# Patient Record
Sex: MALE | Race: WHITE | NOT HISPANIC OR LATINO | Employment: FULL TIME | URBAN - METROPOLITAN AREA
[De-identification: names, ages, dates, MRNs, and addresses within clinical notes are randomized per-mention and may not be internally consistent; named-entity substitution may affect disease eponyms.]

---

## 2017-01-06 ENCOUNTER — ALLSCRIPTS OFFICE VISIT (OUTPATIENT)
Dept: OTHER | Facility: OTHER | Age: 52
End: 2017-01-06

## 2017-06-29 ENCOUNTER — TRANSCRIBE ORDERS (OUTPATIENT)
Dept: SLEEP CENTER | Facility: CLINIC | Age: 52
End: 2017-06-29

## 2017-06-29 DIAGNOSIS — G47.33 OSA AND COPD OVERLAP SYNDROME (HCC): Primary | ICD-10-CM

## 2017-06-29 DIAGNOSIS — J44.9 OSA AND COPD OVERLAP SYNDROME (HCC): Primary | ICD-10-CM

## 2017-06-30 ENCOUNTER — ALLSCRIPTS OFFICE VISIT (OUTPATIENT)
Dept: OTHER | Facility: OTHER | Age: 52
End: 2017-06-30

## 2017-11-21 ENCOUNTER — GENERIC CONVERSION - ENCOUNTER (OUTPATIENT)
Dept: OTHER | Facility: OTHER | Age: 52
End: 2017-11-21

## 2017-12-08 ENCOUNTER — ALLSCRIPTS OFFICE VISIT (OUTPATIENT)
Dept: OTHER | Facility: OTHER | Age: 52
End: 2017-12-08

## 2017-12-08 ENCOUNTER — TRANSCRIBE ORDERS (OUTPATIENT)
Dept: ADMINISTRATIVE | Facility: HOSPITAL | Age: 52
End: 2017-12-08

## 2017-12-08 DIAGNOSIS — R42 DIZZINESS AND GIDDINESS: ICD-10-CM

## 2017-12-08 DIAGNOSIS — I25.10 ATHEROSCLEROSIS OF NATIVE CORONARY ARTERY WITHOUT ANGINA PECTORIS, UNSPECIFIED WHETHER NATIVE OR TRANSPLANTED HEART: Primary | ICD-10-CM

## 2017-12-08 DIAGNOSIS — I10 ESSENTIAL (PRIMARY) HYPERTENSION: ICD-10-CM

## 2017-12-08 DIAGNOSIS — R06.02 SHORTNESS OF BREATH: ICD-10-CM

## 2017-12-08 DIAGNOSIS — I25.10 ATHEROSCLEROTIC HEART DISEASE OF NATIVE CORONARY ARTERY WITHOUT ANGINA PECTORIS: ICD-10-CM

## 2017-12-08 DIAGNOSIS — E78.5 HYPERLIPIDEMIA: ICD-10-CM

## 2018-01-14 VITALS
DIASTOLIC BLOOD PRESSURE: 78 MMHG | WEIGHT: 246 LBS | SYSTOLIC BLOOD PRESSURE: 126 MMHG | HEART RATE: 83 BPM | BODY MASS INDEX: 37.28 KG/M2 | HEIGHT: 68 IN

## 2018-01-14 VITALS
DIASTOLIC BLOOD PRESSURE: 80 MMHG | SYSTOLIC BLOOD PRESSURE: 132 MMHG | HEIGHT: 68 IN | OXYGEN SATURATION: 99 % | HEART RATE: 75 BPM | BODY MASS INDEX: 37.34 KG/M2 | WEIGHT: 246.38 LBS

## 2018-01-23 VITALS
WEIGHT: 263 LBS | HEIGHT: 68 IN | SYSTOLIC BLOOD PRESSURE: 162 MMHG | OXYGEN SATURATION: 99 % | DIASTOLIC BLOOD PRESSURE: 88 MMHG | HEART RATE: 76 BPM | BODY MASS INDEX: 39.86 KG/M2

## 2018-01-23 NOTE — MISCELLANEOUS
Message  Return to work or school:    He is able to return to work on  12/10/2017    He can perform work without limitations  Weight Bearing Status: Full Weight-Bearing           Signatures   Electronically signed by : BARBARA Jauregui ; Dec  8 2017  5:17PM EST                       (Author)

## 2018-02-06 DIAGNOSIS — I10 ESSENTIAL HYPERTENSION: Primary | ICD-10-CM

## 2018-02-06 RX ORDER — TRIAMTERENE AND HYDROCHLOROTHIAZIDE 37.5; 25 MG/1; MG/1
0.5 TABLET ORAL DAILY
Qty: 15 TABLET | Refills: 5 | Status: SHIPPED | OUTPATIENT
Start: 2018-02-06 | End: 2018-09-07

## 2018-02-06 RX ORDER — TRIAMTERENE AND HYDROCHLOROTHIAZIDE 37.5; 25 MG/1; MG/1
0.5 TABLET ORAL DAILY
Refills: 1 | COMMUNITY
Start: 2018-01-03 | End: 2018-02-06 | Stop reason: SDUPTHER

## 2018-02-08 ENCOUNTER — TELEPHONE (OUTPATIENT)
Dept: CARDIOLOGY CLINIC | Facility: CLINIC | Age: 53
End: 2018-02-08

## 2018-02-08 PROBLEM — G47.33 OBSTRUCTIVE SLEEP APNEA ON CPAP: Status: ACTIVE | Noted: 2017-06-30

## 2018-02-08 PROBLEM — Z99.89 OBSTRUCTIVE SLEEP APNEA ON CPAP: Status: ACTIVE | Noted: 2017-06-30

## 2018-02-08 PROBLEM — R42 DIZZINESS: Status: ACTIVE | Noted: 2017-12-08

## 2018-02-08 PROBLEM — E11.9 TYPE 2 DIABETES MELLITUS WITHOUT COMPLICATION, WITHOUT LONG-TERM CURRENT USE OF INSULIN (HCC): Status: ACTIVE | Noted: 2017-01-06

## 2018-02-08 PROBLEM — R06.02 EXERTIONAL SHORTNESS OF BREATH: Status: ACTIVE | Noted: 2017-12-08

## 2018-02-08 PROBLEM — R94.31 ABNORMAL EKG: Status: ACTIVE | Noted: 2017-12-08

## 2018-02-08 PROBLEM — E66.8 MODERATE OBESITY: Status: ACTIVE | Noted: 2017-01-06

## 2018-04-20 DIAGNOSIS — I10 ESSENTIAL HYPERTENSION: Primary | ICD-10-CM

## 2018-04-20 RX ORDER — CARVEDILOL 12.5 MG/1
TABLET ORAL
Qty: 180 TABLET | Refills: 2 | Status: SHIPPED | OUTPATIENT
Start: 2018-04-20 | End: 2019-01-25 | Stop reason: HOSPADM

## 2018-04-26 DIAGNOSIS — Z98.61 HISTORY OF PERCUTANEOUS CORONARY INTERVENTION: Primary | ICD-10-CM

## 2018-04-26 RX ORDER — PRASUGREL 10 MG/1
10 TABLET, FILM COATED ORAL DAILY
Qty: 30 TABLET | Refills: 3 | Status: SHIPPED | OUTPATIENT
Start: 2018-04-26 | End: 2018-09-07

## 2018-09-02 NOTE — PROGRESS NOTES
Progress Note - Cardiology Office  [facility]    Lorenzo Gosselin Ferencin 48 y o  male MRN: [id number]  : 1965  Encounter: [id number]      Assessment:     1  New onset atrial fibrillation ([facility] )    2  Coronary artery disease involving native coronary artery of native heart with angina pectoris ([facility] )    3  History of percutaneous coronary intervention    4  Essential hypertension    5  Mixed hyperlipidemia    6  Moderate obesity    7  Obstructive sleep apnea on CPAP    8  Type 2 diabetes mellitus without complication, without long-term current use of insulin ([facility] )        Discussion Summary and Plan:    1  New onset newly diagnosed atrial fibrillation  Patient is noted to have atrial fibrillation with controlled ventricular rate  He is already on AV node blocking drug  He did feel fluttering few weeks ago but he had a noted  Will repeat nuclear stress test to rule out any ischemia and he will be scheduled for ELZBIETA guided cardioversion  We will stop his Effient and start him on antithrombotic therapy with Eliquis  Samples given  Continue Eliquis  Issues related to atrial fibrillation like rate control, rhythm control, risk of bleeding, no antidote of Eliquis, regularly using his CPAP machine all discussed at length with patient and patient's wife  All their questions answered  2  Coronary artery disease status post angioplasty of LAD in 2015 with a drug-eluting stent and nonobstructive disease in the right and circumflex coronary artery  Will update nuclear stress test as may need flecainide other medications  3  Dyslipidemia  He is well controlled on statin therapy  Check fasting lipids and LFTs    4  Hypertension  Patient blood pressure is much better controlled  Will continue Coreg Lotensin  Weight loss also has helped to control blood pressure  5  Moderate obesity  Patient is advised to lose weight  Patient now follow up with endocrinology    Medications were switched this has helped to lose weight  6  Obstructive sleep apnea  On CPAP  As per wife he has not been compliant with it  This may be also contributing to underlying atrial fibrillation  Patient advised to be compliant with this  All issues discussed at length  Routine labs ordered  Patient scheduled for ELZBIETA guided cardioversion and stress test   Discussed with patient and patient's wife  Counseling :  A description of the counseling  Goals and Barriers  Patient's ability to self care: Yes  Medication side effect reviewed with patient in detail and all their questions answered to their satisfaction  HPI :     Lyanne Olszewski is a 48y o  year old male who came for follow up  Patient who iis working as a  in [employer], history of coronary artery disease status post stent of LAD In February 2015, diabetes mellitus, metabolic syndrome, hypertension, anxiety , Status post ankle surgery And dyslipidemia, Who came for regular follow-up  During his previous admission his blood pressure was high currently his blood pressure is pretty well controlled  Denies any chest pain or any shortness of breath  No PND  No orthopnea, no other significant complaint  recently got  and came with his wife    /08/2017came for follow-up  Above reviewed  Complaining about anxiety and stress  Blood pressure was high  When checked by me was 162/88  Denies any fever or any chills  Has some exertional shortness of breath  Complaining about dizziness and wobbly feeling  Also stressed at work  He is concerned about his erectile dysfunction  He had performance anxiety  No other significant complaint today  No recent blood test with us  Very concerned about worsening of his plaque and blockage as well as his stent  09/07/2018  Above reviewed  Patient came for follow-up  He is doing well however on 12 lead EKG is found to be in atrial fibrillation with controlled ventricular rate    He was not even aware that he has irregular heartbeat  He was diagnosed with sleep apnea but he is not using his CPAP machine regularly  He has a past medical history significant for coronary artery disease status post angioplasty of LAD, dyslipidemia, hypertension, anxiety and moderate obesity  He also had uncontrolled diabetes mellitus which is now much better  Patient does not smoke, does not drink and no history of any drug abuse  He has no issues with GI bleeding  He has taken Effient for more than 4 years  Previously he had reaction to definity  No nausea no vomiting no PND no orthopnea no other significant complaint  now patient remembers about few weeks ago he noted his heart was fluttering and it was irregular  Patient has colonoscopy done last   Month and benign polyp was removed  Review of Systems   Constitutional: Negative for activity change, chills, diaphoresis, fever and unexpected weight change  HENT: Negative for congestion  Eyes: Negative for discharge and redness  Respiratory: Negative for cough, chest tightness, shortness of breath and wheezing  Cardiovascular: Positive for palpitations  Negative for chest pain and leg swelling  Gastrointestinal: Negative for abdominal pain, diarrhea and nausea  Endocrine: Negative  Genitourinary: Negative for decreased urine volume and urgency  Musculoskeletal: Negative  Negative for arthralgias, back pain and gait problem  Skin: Negative for rash and wound  Allergic/Immunologic: Negative  Neurological: Negative for dizziness, seizures, syncope, weakness, light-headedness and headaches  Hematological: Negative  Psychiatric/Behavioral: Negative for agitation and confusion  The patient is not nervous/anxious          Historical Information   Past Medical History:   Diagnosis Date    CAD (coronary artery disease)     Diabetes ([address] )     Dizziness     Erectile dysfunction     GERD (gastroesophageal reflux disease)     Hyperlipidemia     Hypertension     Shortness of breath      Past Surgical History:   Procedure Laterality Date    CARDIAC CATHETERIZATION       History   Alcohol Use    0 6 oz/week    1 Cans of beer per week     Comment: rarely     History   Drug use: Unknown     History   Smoking Status    Never Smoker   Smokeless Tobacco    Former User    Quit date: 9/7/2014     Family History:   Family History   Problem Relation Age of Onset    Coronary artery disease Family     Diabetes Family        Meds/Allergies     Allergies   Allergen Reactions    Other        Current Outpatient Prescriptions:     amLODIPine (NORVASC) 2 5 mg tablet, Take 2 5 mg by mouth daily, Disp: , Rfl: 0    benazepril (LOTENSIN) 40 MG tablet, Take 40 mg by mouth daily, Disp: , Rfl: 1    carvedilol (COREG) 12 5 mg tablet, TAKE 1 TABLET TWICE DAILY, Disp: 180 tablet, Rfl: 2    cholecalciferol (VITAMIN D3) 1,000 units tablet, Take by mouth daily, Disp: , Rfl:     co-enzyme Q-10 30 MG capsule, Take 30 mg by mouth 3 (three) times a day, Disp: , Rfl:     ONETOUCH VERIO test strip, CHECK TWICE DAILY, Disp: , Rfl: 3    OZEMPIC 1 MG/DOSE SOPN, INJECT 1 MG SQ Q WEEK, Disp: , Rfl: 4    prasugrel (EFFIENT) tablet, Take 1 tablet (10 mg total) by mouth daily, Disp: 30 tablet, Rfl: 3    rosuvastatin (CRESTOR) 20 MG tablet, Take 20 mg by mouth, Disp: , Rfl:     SYNJARDY XR 12 5-1000 MG TB24, TK 1 T PO BID, Disp: , Rfl: 2    Vitals: Blood pressure 122/78, pulse 87, height 5' 7" (1 702 m), weight 103 kg (227 lb), SpO2 98 %  Body mass index is 35 55 kg/m²    Vitals:    09/07/18 1358   Weight: 103 kg (227 lb)     BP Readings from Last 3 Encounters:   09/07/18 122/78   12/08/17 162/88   06/30/17 126/78         Physical Exam    Neurologic:  Alert & oriented x 3, no new focal deficits, Not in any acute distress,  Constitutional:  Well developed, well nourished, non-toxic appearance   Eyes:  Pupil equal and reacting to light, conjunctiva normal, No JVP, No LNP   HENT:  Atraumatic, oropharynx moist, Neck- normal range of motion, no tenderness,  Neck supple   Respiratory:  Bilateral air entry, mostly clear to auscultation  Cardiovascular: S1-S2   Irregular with heart rate 87 beats per minute and 2/6 ejection systolic murmur  GI:  Soft, nondistended, normal bowel sounds, nontender, no hepatosplenomegaly appreciated  Musculoskeletal:  No edema, no tenderness, no deformities  Skin:  Well hydrated, no rash   Lymphatic:  No lymphadenopathy noted   Extremities:  No edema and distal pulses are present    Diagnostic Studies Review Cardio:    Catheterization: Cardiac catheterization in February 2015 shows significant stenosis of the LAD which was successfully stented with a drug-eluting stent and nonobstructive disease of RCA and circumflex with EF around 45-50%  ECG Report:  Comparison to prior ECGs:    6/30/2017, 12-lead EKG shows normal sinus rhythm, left differential S2 block, LVH by voltage, nonspecific ST changes  As compared to old EKG  Patient has more ST-T changes 3lead EKG done in 12/08/2017 shows normal sinus rhythm, left anterior fascicular block, ST-T changes in lateral precordial leads  Not change from old EKG  09/07/2018  Twelve lead EKG shows atrial fibrillation with heart rate 87 beats per minute  Nonspecific ST changes  As compared to previous EKG atrial fibrillation is newly noted  Dr Daron Panchal MD [facility] [place]      "This note has been constructed using a voice recognition system  Therefore there may be syntax, spelling, and/or grammatical errors   Please call if you have any questions  "

## 2018-09-07 ENCOUNTER — OFFICE VISIT (OUTPATIENT)
Dept: CARDIOLOGY CLINIC | Facility: CLINIC | Age: 53
End: 2018-09-07
Payer: COMMERCIAL

## 2018-09-07 VITALS
BODY MASS INDEX: 35.63 KG/M2 | WEIGHT: 227 LBS | DIASTOLIC BLOOD PRESSURE: 78 MMHG | HEART RATE: 87 BPM | HEIGHT: 67 IN | SYSTOLIC BLOOD PRESSURE: 122 MMHG | OXYGEN SATURATION: 98 %

## 2018-09-07 DIAGNOSIS — E11.9 TYPE 2 DIABETES MELLITUS WITHOUT COMPLICATION, WITHOUT LONG-TERM CURRENT USE OF INSULIN (HCC): ICD-10-CM

## 2018-09-07 DIAGNOSIS — Z99.89 OBSTRUCTIVE SLEEP APNEA ON CPAP: ICD-10-CM

## 2018-09-07 DIAGNOSIS — I48.91 NEW ONSET ATRIAL FIBRILLATION (HCC): ICD-10-CM

## 2018-09-07 DIAGNOSIS — I48.91 NEW ONSET ATRIAL FIBRILLATION (HCC): Primary | ICD-10-CM

## 2018-09-07 DIAGNOSIS — G47.33 OBSTRUCTIVE SLEEP APNEA ON CPAP: ICD-10-CM

## 2018-09-07 DIAGNOSIS — I25.119 CORONARY ARTERY DISEASE INVOLVING NATIVE CORONARY ARTERY OF NATIVE HEART WITH ANGINA PECTORIS (HCC): ICD-10-CM

## 2018-09-07 DIAGNOSIS — E66.8 MODERATE OBESITY: ICD-10-CM

## 2018-09-07 DIAGNOSIS — Z98.61 HISTORY OF PERCUTANEOUS CORONARY INTERVENTION: ICD-10-CM

## 2018-09-07 DIAGNOSIS — I10 ESSENTIAL HYPERTENSION: ICD-10-CM

## 2018-09-07 DIAGNOSIS — E78.2 MIXED HYPERLIPIDEMIA: ICD-10-CM

## 2018-09-07 PROCEDURE — 93000 ELECTROCARDIOGRAM COMPLETE: CPT | Performed by: INTERNAL MEDICINE

## 2018-09-07 PROCEDURE — 99215 OFFICE O/P EST HI 40 MIN: CPT | Performed by: INTERNAL MEDICINE

## 2018-09-07 RX ORDER — AMLODIPINE BESYLATE 2.5 MG/1
2.5 TABLET ORAL DAILY
Qty: 30 TABLET | Refills: 3 | Status: SHIPPED | OUTPATIENT
Start: 2018-09-07 | End: 2019-01-25 | Stop reason: HOSPADM

## 2018-09-07 RX ORDER — BLOOD SUGAR DIAGNOSTIC
STRIP MISCELLANEOUS
Refills: 3 | COMMUNITY
Start: 2018-07-06

## 2018-09-07 RX ORDER — EMPAGLIFLOZIN, METFORMIN HYDROCHLORIDE 12.5; 1 MG/1; MG/1
TABLET, EXTENDED RELEASE ORAL
Refills: 2 | COMMUNITY
Start: 2018-07-09

## 2018-09-07 RX ORDER — MELATONIN
DAILY
COMMUNITY

## 2018-09-07 RX ORDER — AMLODIPINE BESYLATE 2.5 MG/1
2.5 TABLET ORAL DAILY
Refills: 0 | COMMUNITY
Start: 2018-06-11 | End: 2018-09-07 | Stop reason: SDUPTHER

## 2018-09-07 RX ORDER — SEMAGLUTIDE 1.34 MG/ML
INJECTION, SOLUTION SUBCUTANEOUS
Refills: 4 | COMMUNITY
Start: 2018-08-07

## 2018-09-07 RX ORDER — BENAZEPRIL HYDROCHLORIDE 40 MG/1
40 TABLET, FILM COATED ORAL DAILY
Refills: 1 | COMMUNITY
Start: 2018-06-28 | End: 2018-11-27 | Stop reason: SDUPTHER

## 2018-09-07 RX ORDER — ROSUVASTATIN CALCIUM 20 MG/1
20 TABLET, COATED ORAL
COMMUNITY
End: 2019-10-10 | Stop reason: SDUPTHER

## 2018-09-07 RX ORDER — ASPIRIN 81 MG/1
81 TABLET, CHEWABLE ORAL DAILY
Qty: 100 TABLET | Refills: 1 | Status: SHIPPED | OUTPATIENT
Start: 2018-09-07

## 2018-09-26 ENCOUNTER — HOSPITAL ENCOUNTER (OUTPATIENT)
Dept: NON INVASIVE DIAGNOSTICS | Facility: CLINIC | Age: 53
Discharge: HOME | End: 2018-09-26
Payer: COMMERCIAL

## 2018-09-26 DIAGNOSIS — Z98.61 HISTORY OF PERCUTANEOUS CORONARY INTERVENTION: ICD-10-CM

## 2018-09-26 DIAGNOSIS — I25.119 CORONARY ARTERY DISEASE INVOLVING NATIVE CORONARY ARTERY OF NATIVE HEART WITH ANGINA PECTORIS (HCC): ICD-10-CM

## 2018-09-26 DIAGNOSIS — I48.91 NEW ONSET ATRIAL FIBRILLATION (HCC): ICD-10-CM

## 2018-09-26 PROCEDURE — 93016 CV STRESS TEST SUPVJ ONLY: CPT | Performed by: INTERNAL MEDICINE

## 2018-09-26 PROCEDURE — 78452 HT MUSCLE IMAGE SPECT MULT: CPT | Performed by: INTERNAL MEDICINE

## 2018-09-26 PROCEDURE — 78452 HT MUSCLE IMAGE SPECT MULT: CPT

## 2018-09-26 PROCEDURE — 93017 CV STRESS TEST TRACING ONLY: CPT

## 2018-09-26 PROCEDURE — A9502 TC99M TETROFOSMIN: HCPCS

## 2018-09-26 PROCEDURE — 93018 CV STRESS TEST I&R ONLY: CPT | Performed by: INTERNAL MEDICINE

## 2018-09-27 ENCOUNTER — TELEPHONE (OUTPATIENT)
Dept: CARDIOLOGY CLINIC | Facility: CLINIC | Age: 53
End: 2018-09-27

## 2018-09-27 LAB
CHEST PAIN STATEMENT: NORMAL
MAX DIASTOLIC BP: 108 MMHG
MAX HEART RATE: 173 BPM
MAX PREDICTED HEART RATE: 169 BPM
MAX. SYSTOLIC BP: 184 MMHG
PROTOCOL NAME: NORMAL
REASON FOR TERMINATION: NORMAL
TARGET HR FORMULA: NORMAL
TEST INDICATION: NORMAL
TIME IN EXERCISE PHASE: NORMAL

## 2018-09-27 NOTE — TELEPHONE ENCOUNTER
----- Message from Yury Alicia sent at 9/27/2018  2:49 PM EDT -----      ----- Message -----  From: Hemalatha Choe MD  Sent: 9/26/2018   5:28 PM  To: Nargis Epps    Patient has abnormal stress test   Patient may need cardiac catheterization  Scheduled appointment to see him  His ejection fraction is low but no ischemia seen    His blood pressure has been high

## 2018-10-15 NOTE — PROGRESS NOTES
Progress Note - Cardiology Office  [facility]    Daniela Jeter 48 y o  male MRN: [id number]  : 1965  Encounter: [id number]      Assessment:     1  Coronary artery disease involving native coronary artery of native heart with angina pectoris ([facility] )    2  Exertional shortness of breath    3  Mixed hyperlipidemia    4  Obstructive sleep apnea on CPAP    5  Type 2 diabetes mellitus without complication, without long-term current use of insulin ([facility] )    6  Essential hypertension    7  Moderate obesity    8  Abnormal EKG        Discussion Summary and Plan:    1  New onset newly diagnosed atrial fibrillation  Patient is noted to have atrial fibrillation with controlled ventricular rate  He is already on AV node blocking drug  He did feel fluttering few weeks ago but he had a noted  Nuclear stress test shows patient has fixed defect no clear ischemia however ejection fraction has decreased  He will be scheduled for ELZBIETA guided cardioversion  2  Coronary artery disease status post angioplasty of LAD in 2015 with a drug-eluting stent and nonobstructive disease in the right and circumflex coronary artery  Nuclear stress test was mildly abnormal with decreased LV systolic function with no clear ischemia  There was a fixed defect  May need cardiac catheterization at some point  3  Dyslipidemia  He is well controlled on statin therapy  Check fasting lipids and LFTs  Patient just had blood test done today will reviewed  4  Hypertension  Patient blood pressure is much better controlled  Will continue Coreg Lotensin  Weight loss also has helped to control blood pressure  5  Moderate obesity  Patient is advised to lose weight  Patient now follow up with endocrinology  Medications were switched this has helped to lose weight  6  Obstructive sleep apnea  On CPAP  Patient is now much compliant with CPAP machine now  He is feeling much better clinically    Further plan after ELZBIETA guided cardioversion  If his ejection fraction improved few months after cardioversion  Then he may need no further workup however if EF does not improve he may need cardiac catheterization  No symptoms of chest pain or shortness of breath at this time  Counseling :  A description of the counseling  Goals and Barriers  Patient's ability to self care: Yes  Medication side effect reviewed with patient in detail and all their questions answered to their satisfaction  HPI :     Sudha Zambrano is a 48y o  year old male who came for follow up  Patient who iis working as a  in [employer], history of coronary artery disease status post stent of LAD In February 2015, diabetes mellitus, metabolic syndrome, hypertension, anxiety , Status post ankle surgery And dyslipidemia, Who came for regular follow-up  During his previous admission his blood pressure was high currently his blood pressure is pretty well controlled  Denies any chest pain or any shortness of breath  No PND  No orthopnea, no other significant complaint  recently got  and came with his wife    /08/2017came for follow-up  Above reviewed  Complaining about anxiety and stress  Blood pressure was high  When checked by me was 162/88  Denies any fever or any chills  Has some exertional shortness of breath  Complaining about dizziness and wobbly feeling  Also stressed at work  He is concerned about his erectile dysfunction  He had performance anxiety  No other significant complaint today  No recent blood test with us  Very concerned about worsening of his plaque and blockage as well as his stent  09/07/2018  Above reviewed  Patient came for follow-up  He is doing well however on 12 lead EKG is found to be in atrial fibrillation with controlled ventricular rate  He was not even aware that he has irregular heartbeat  He was diagnosed with sleep apnea but he is not using his CPAP machine regularly    He has a past medical history significant for coronary artery disease status post angioplasty of LAD, dyslipidemia, hypertension, anxiety and moderate obesity  He also had uncontrolled diabetes mellitus which is now much better  Patient does not smoke, does not drink and no history of any drug abuse  He has no issues with GI bleeding  He has taken Effient for more than 4 years  Previously he had reaction to definity  No nausea no vomiting no PND no orthopnea no other significant complaint  now patient remembers about few weeks ago he noted his heart was fluttering and it was irregular  Patient has colonoscopy done last   Month and benign polyp was removed  10/16/2018  Above reviewed  Patient came for follow-up  Patient has lost about 30 lb  He is feeling much better  He is staying in atrial fibrillation  Nuclear stress test shows fixed inferior apical defect  EF is noted to be low  Previously has a normal LV systolic function  He is very anxious about his cardioversion  He came with his wife  No leg swelling no nausea no vomiting  Sugar is much better blood pressure is much better  No dizziness no other significant issues at this time  Review of Systems   Constitutional: Negative for activity change, chills, diaphoresis, fever and unexpected weight change  HENT: Negative for congestion  Eyes: Negative for discharge and redness  Respiratory: Negative for cough, chest tightness, shortness of breath and wheezing  Cardiovascular: Positive for palpitations  Negative for chest pain and leg swelling  Gastrointestinal: Negative for abdominal pain, diarrhea and nausea  Endocrine: Negative  Genitourinary: Negative for decreased urine volume and urgency  Musculoskeletal: Negative  Negative for arthralgias, back pain and gait problem  Skin: Negative for rash and wound  Allergic/Immunologic: Negative      Neurological: Negative for dizziness, seizures, syncope, weakness, light-headedness and headaches  Hematological: Negative  Psychiatric/Behavioral: Negative for agitation and confusion  The patient is nervous/anxious          Historical Information   Past Medical History:   Diagnosis Date    CAD (coronary artery disease)     Diabetes ([address] )     Dizziness     Erectile dysfunction     GERD (gastroesophageal reflux disease)     Hyperlipidemia     Hypertension     Shortness of breath      Past Surgical History:   Procedure Laterality Date    CARDIAC CATHETERIZATION       History   Alcohol Use    0 6 oz/week    1 Cans of beer per week     Comment: rarely     History   Drug use: Unknown     History   Smoking Status    Never Smoker   Smokeless Tobacco    Former User    Quit date: 9/7/2014     Family History:   Family History   Problem Relation Age of Onset    Coronary artery disease Family     Diabetes Family        Meds/Allergies     Allergies   Allergen Reactions    Other        Current Outpatient Prescriptions:     amLODIPine (NORVASC) 2 5 mg tablet, Take 1 tablet (2 5 mg total) by mouth daily, Disp: 30 tablet, Rfl: 3    apixaban (ELIQUIS) 5 mg, Take 1 tablet (5 mg total) by mouth 2 (two) times a day, Disp: 56 tablet, Rfl: 0    aspirin 81 mg chewable tablet, Chew 1 tablet (81 mg total) daily, Disp: 100 tablet, Rfl: 1    benazepril (LOTENSIN) 40 MG tablet, Take 40 mg by mouth daily, Disp: , Rfl: 1    carvedilol (COREG) 12 5 mg tablet, TAKE 1 TABLET TWICE DAILY, Disp: 180 tablet, Rfl: 2    cholecalciferol (VITAMIN D3) 1,000 units tablet, Take by mouth daily, Disp: , Rfl:     co-enzyme Q-10 30 MG capsule, Take 30 mg by mouth 3 (three) times a day, Disp: , Rfl:     ONETOUCH VERIO test strip, CHECK TWICE DAILY, Disp: , Rfl: 3    OZEMPIC 1 MG/DOSE SOPN, INJECT 1 MG SQ Q WEEK, Disp: , Rfl: 4    rosuvastatin (CRESTOR) 20 MG tablet, Take 20 mg by mouth, Disp: , Rfl:     SYNJARDY XR 12 5-1000 MG TB24, TK 1 T PO BID, Disp: , Rfl: 2    Vitals: Blood pressure 122/90, pulse 80, weight 106 kg (234 lb), SpO2 96 %  Body mass index is 36 65 kg/m²  Vitals:    10/16/18 1307   Weight: 106 kg (234 lb)     BP Readings from Last 3 Encounters:   10/16/18 122/90   09/07/18 122/78   12/08/17 162/88         Physical Exam   Constitutional: He is oriented to person, place, and time  He appears well-developed and well-nourished  No distress  HENT:   Head: Normocephalic and atraumatic  Eyes: Pupils are equal, round, and reactive to light  Neck: Neck supple  No JVD present  No thyromegaly present  Cardiovascular: Normal rate, S1 normal, S2 normal and intact distal pulses  An irregularly irregular rhythm present  Exam reveals no gallop, no S3, no S4, no distant heart sounds and no friction rub  Murmur heard  Systolic murmur is present   Pulmonary/Chest: Effort normal and breath sounds normal  No respiratory distress  He has no wheezes  He has no rales  He exhibits no tenderness  Abdominal: Soft  He exhibits no distension  There is no tenderness  Musculoskeletal: He exhibits no edema or deformity  Lymphadenopathy:     He has no cervical adenopathy  Neurological: He is alert and oriented to person, place, and time  Skin: Skin is warm and dry  No rash noted  He is not diaphoretic  No pallor  Psychiatric: He has a normal mood and affect  Judgment normal      Diagnostic Studies Review Cardio:    Nuclear stress test   Nuclear stress test done on 09/26/2018 shows dilated LV EF around 30-35%  Fixed defect no clear ischemia was seen  But patient's ejection fraction has certainly decreased from previous echo    Catheterization: Cardiac catheterization in February 2015 shows significant stenosis of the LAD which was successfully stented with a drug-eluting stent and nonobstructive disease of RCA and circumflex with EF around 45-50%       ECG Report:  Comparison to prior ECGs:    6/30/2017, 12-lead EKG shows normal sinus rhythm, left differential S2 block, LVH by voltage, nonspecific ST changes  As compared to old EKG  Patient has more ST-T changes 3lead EKG done in 12/08/2017 shows normal sinus rhythm, left anterior fascicular block, ST-T changes in lateral precordial leads  Not change from old EKG  09/07/2018  Twelve lead EKG shows atrial fibrillation with heart rate 87 beats per minute  Nonspecific ST changes  As compared to previous EKG atrial fibrillation is newly noted  10/16/2018  Twelve lead EKG shows atrial fibrillation heart rate 79 beats per minute  No significant ST changes  Dr Jeison Humphrey MD [facility] [place]      "This note has been constructed using a voice recognition system  Therefore there may be syntax, spelling, and/or grammatical errors   Please call if you have any questions  "

## 2018-10-16 ENCOUNTER — OFFICE VISIT (OUTPATIENT)
Dept: CARDIOLOGY CLINIC | Facility: CLINIC | Age: 53
End: 2018-10-16
Payer: COMMERCIAL

## 2018-10-16 VITALS
SYSTOLIC BLOOD PRESSURE: 122 MMHG | HEART RATE: 80 BPM | OXYGEN SATURATION: 96 % | BODY MASS INDEX: 36.65 KG/M2 | DIASTOLIC BLOOD PRESSURE: 90 MMHG | WEIGHT: 234 LBS

## 2018-10-16 DIAGNOSIS — E66.8 MODERATE OBESITY: ICD-10-CM

## 2018-10-16 DIAGNOSIS — R06.02 EXERTIONAL SHORTNESS OF BREATH: ICD-10-CM

## 2018-10-16 DIAGNOSIS — E78.2 MIXED HYPERLIPIDEMIA: ICD-10-CM

## 2018-10-16 DIAGNOSIS — G47.33 OBSTRUCTIVE SLEEP APNEA ON CPAP: ICD-10-CM

## 2018-10-16 DIAGNOSIS — E11.9 TYPE 2 DIABETES MELLITUS WITHOUT COMPLICATION, WITHOUT LONG-TERM CURRENT USE OF INSULIN (HCC): ICD-10-CM

## 2018-10-16 DIAGNOSIS — I25.119 CORONARY ARTERY DISEASE INVOLVING NATIVE CORONARY ARTERY OF NATIVE HEART WITH ANGINA PECTORIS (HCC): ICD-10-CM

## 2018-10-16 DIAGNOSIS — R94.31 ABNORMAL EKG: ICD-10-CM

## 2018-10-16 DIAGNOSIS — Z99.89 OBSTRUCTIVE SLEEP APNEA ON CPAP: ICD-10-CM

## 2018-10-16 DIAGNOSIS — I10 ESSENTIAL HYPERTENSION: ICD-10-CM

## 2018-10-16 PROCEDURE — 99214 OFFICE O/P EST MOD 30 MIN: CPT | Performed by: INTERNAL MEDICINE

## 2018-10-16 PROCEDURE — 93000 ELECTROCARDIOGRAM COMPLETE: CPT | Performed by: INTERNAL MEDICINE

## 2018-10-16 NOTE — PATIENT INSTRUCTIONS

## 2018-10-18 ENCOUNTER — TELEPHONE (OUTPATIENT)
Dept: CARDIOLOGY CLINIC | Facility: CLINIC | Age: 53
End: 2018-10-18

## 2018-10-18 NOTE — TELEPHONE ENCOUNTER
----- Message from Jason Mayfield MD sent at 10/17/2018  5:26 PM EDT -----  Patient labs are acceptable  Continue same medications  Patient is advised to follow up  appointment regularly  Please call patient about the  about results

## 2018-10-18 NOTE — TELEPHONE ENCOUNTER
Patient notified of lab results  Pt has a scheduled appt  on 10/24th;agreed to keep this follow up appt

## 2018-10-24 ENCOUNTER — HOSPITAL ENCOUNTER (OUTPATIENT)
Dept: NON INVASIVE DIAGNOSTICS | Facility: HOSPITAL | Age: 53
Discharge: HOME | End: 2018-10-24
Attending: INTERNAL MEDICINE
Payer: COMMERCIAL

## 2018-10-24 ENCOUNTER — ANESTHESIA EVENT (OUTPATIENT)
Dept: NON INVASIVE DIAGNOSTICS | Facility: HOSPITAL | Age: 53
End: 2018-10-24

## 2018-10-24 ENCOUNTER — ANESTHESIA (OUTPATIENT)
Dept: NON INVASIVE DIAGNOSTICS | Facility: HOSPITAL | Age: 53
End: 2018-10-24

## 2018-10-24 VITALS
DIASTOLIC BLOOD PRESSURE: 93 MMHG | RESPIRATION RATE: 17 BRPM | SYSTOLIC BLOOD PRESSURE: 139 MMHG | OXYGEN SATURATION: 96 % | HEART RATE: 76 BPM

## 2018-10-24 DIAGNOSIS — I48.91 NEW ONSET ATRIAL FIBRILLATION (HCC): ICD-10-CM

## 2018-10-24 PROCEDURE — 92960 CARDIOVERSION ELECTRIC EXT: CPT | Performed by: INTERNAL MEDICINE

## 2018-10-24 PROCEDURE — 92960 CARDIOVERSION ELECTRIC EXT: CPT

## 2018-10-24 PROCEDURE — 93312 ECHO TRANSESOPHAGEAL: CPT

## 2018-10-24 RX ORDER — SODIUM CHLORIDE 9 MG/ML
INJECTION, SOLUTION INTRAVENOUS
Status: COMPLETED | OUTPATIENT
Start: 2018-10-24 | End: 2018-10-24

## 2018-10-24 RX ORDER — PROPOFOL 10 MG/ML
INJECTION, EMULSION INTRAVENOUS CONTINUOUS PRN
Status: DISCONTINUED | OUTPATIENT
Start: 2018-10-24 | End: 2018-10-24 | Stop reason: SURG

## 2018-10-24 RX ADMIN — PROPOFOL 150 MCG/KG/MIN: 10 INJECTION, EMULSION INTRAVENOUS at 08:23

## 2018-10-24 RX ADMIN — SODIUM CHLORIDE 50 ML/HR: 0.9 INJECTION, SOLUTION INTRAVENOUS at 07:12

## 2018-10-24 NOTE — ANESTHESIA POSTPROCEDURE EVALUATION
Post-Op Assessment Note      CV Status:  Stable    Mental Status:  Alert and awake    Hydration Status:  Euvolemic    PONV Controlled:  Controlled    Airway Patency:  Patent    Post Op Vitals Reviewed: Yes          Staff: Anesthesiologist, CRNA           BP   153/99   Temp      Pulse  93   Resp      SpO2   99

## 2018-10-24 NOTE — PROCEDURES
Raoul Jeter  [id number]  10/24/2018  Carley Morales MD      PRE-OP DIAGNOSIS: Persistant atrial fibrillation      POST-OP DIAGNOSIS: Successful electrical cardioversion of atrial fibrillation to normal sinus rhythm with PACs  : Dr Breann Zuñiga MD  [facility]    ANESTHESIA: Propofol given by anesthesiology  COMPLICATIONS: None  OPERATIVE TERM: DC cardioversion  The nature of the procedure, risks and alternatives were discussed with the patient who gave informed consent  Pt  and family understands risks associated with procedure and complication of stroke etc  were discussed with them in detail  OPERATIVE TECHNIQUE: The patient was sedated with propofol  When the patient was no longer responsive to quiet voice, DC cardioversion was performed with 200 J biphasically and synchronously  The patient was then monitored until fully alert and left the procedure area in stable condition  IMPRESSION: Successful DC cardioversion of atrial fibrillation to normal sinus rhythm with PACs  Breann Zuñiga MD [facility]      "This note has been constructed using a voice recognition system  Therefore there may be syntax, spelling, and/or grammatical errors   Please call if you have any questions  "

## 2018-10-24 NOTE — ANESTHESIA PREPROCEDURE EVALUATION
Review of Systems/Medical History  Patient summary reviewed  Chart reviewed      Cardiovascular  EKG reviewed, Exercise tolerance (METS): >4,  Hyperlipidemia, Hypertension , CAD , CAD status: obstructive and 1VD, Cardiac stents drug eluting and > 1 year Dysrhythmias , atrial fibrillation,    Pulmonary  Shortness of breath, Sleep apnea CPAP,        GI/Hepatic    GERD well controlled,             Endo/Other  Diabetes well controlled type 2 Oral agent,   Obesity  morbid obesity   GYN       Hematology   Musculoskeletal       Neurology   Psychology           Physical Exam    Airway    Mallampati score: II  TM Distance: >3 FB  Neck ROM: full     Dental       Cardiovascular  Rhythm: regular, Rate: normal,     Pulmonary  Breath sounds clear to auscultation,     Other Findings        Anesthesia Plan  ASA Score- 3     Anesthesia Type- IV sedation with anesthesia with ASA Monitors  Additional Monitors:   Airway Plan:         Plan Factors-    Induction- intravenous  Postoperative Plan-     Informed Consent- Anesthetic plan and risks discussed with patient

## 2018-10-26 ENCOUNTER — TELEPHONE (OUTPATIENT)
Dept: CARDIOLOGY CLINIC | Facility: CLINIC | Age: 53
End: 2018-10-26

## 2018-10-26 NOTE — TELEPHONE ENCOUNTER
PATIENT SEES  [facility] [place] AND WAS REFERRED TO DR Gomez Sampson BUT HIS OFFICE SAID HE IS NOT ACCEPTING NEW PATIENTS  MAGALIE'S WIFE WOULD LIKE TO KNOW WHO ELSE  [facility] [place] WOULD RECOMMEND

## 2018-10-29 PROCEDURE — 93325 DOPPLER ECHO COLOR FLOW MAPG: CPT | Performed by: INTERNAL MEDICINE

## 2018-10-29 PROCEDURE — 93320 DOPPLER ECHO COMPLETE: CPT | Performed by: INTERNAL MEDICINE

## 2018-10-29 PROCEDURE — 93312 ECHO TRANSESOPHAGEAL: CPT | Performed by: INTERNAL MEDICINE

## 2018-10-29 NOTE — TELEPHONE ENCOUNTER
I already called Dr Rita Perez  He will accept him  So please let the patient know and tell patient to call them again

## 2018-11-07 ENCOUNTER — OFFICE VISIT (OUTPATIENT)
Dept: FAMILY MEDICINE CLINIC | Facility: CLINIC | Age: 53
End: 2018-11-07
Payer: COMMERCIAL

## 2018-11-07 VITALS
OXYGEN SATURATION: 98 % | HEIGHT: 67 IN | DIASTOLIC BLOOD PRESSURE: 72 MMHG | SYSTOLIC BLOOD PRESSURE: 132 MMHG | BODY MASS INDEX: 37.04 KG/M2 | HEART RATE: 84 BPM | WEIGHT: 236 LBS

## 2018-11-07 DIAGNOSIS — Z99.89 OBSTRUCTIVE SLEEP APNEA ON CPAP: ICD-10-CM

## 2018-11-07 DIAGNOSIS — E11.9 TYPE 2 DIABETES MELLITUS WITHOUT COMPLICATION, WITHOUT LONG-TERM CURRENT USE OF INSULIN (HCC): ICD-10-CM

## 2018-11-07 DIAGNOSIS — E66.8 MODERATE OBESITY: ICD-10-CM

## 2018-11-07 DIAGNOSIS — Z23 NEED FOR PROPHYLACTIC VACCINATION AGAINST STREPTOCOCCUS PNEUMONIAE (PNEUMOCOCCUS): ICD-10-CM

## 2018-11-07 DIAGNOSIS — I10 ESSENTIAL HYPERTENSION: ICD-10-CM

## 2018-11-07 DIAGNOSIS — E78.2 MIXED HYPERLIPIDEMIA: ICD-10-CM

## 2018-11-07 DIAGNOSIS — G47.33 OBSTRUCTIVE SLEEP APNEA ON CPAP: ICD-10-CM

## 2018-11-07 DIAGNOSIS — I25.119 CORONARY ARTERY DISEASE INVOLVING NATIVE CORONARY ARTERY OF NATIVE HEART WITH ANGINA PECTORIS (HCC): ICD-10-CM

## 2018-11-07 DIAGNOSIS — M54.2 CERVICALGIA: Primary | ICD-10-CM

## 2018-11-07 PROCEDURE — 3078F DIAST BP <80 MM HG: CPT | Performed by: FAMILY MEDICINE

## 2018-11-07 PROCEDURE — 90471 IMMUNIZATION ADMIN: CPT

## 2018-11-07 PROCEDURE — 99204 OFFICE O/P NEW MOD 45 MIN: CPT | Performed by: FAMILY MEDICINE

## 2018-11-07 PROCEDURE — 90670 PCV13 VACCINE IM: CPT

## 2018-11-07 PROCEDURE — 3075F SYST BP GE 130 - 139MM HG: CPT | Performed by: FAMILY MEDICINE

## 2018-11-09 PROBLEM — Z23 NEED FOR PROPHYLACTIC VACCINATION AGAINST STREPTOCOCCUS PNEUMONIAE (PNEUMOCOCCUS): Status: ACTIVE | Noted: 2018-11-09

## 2018-11-09 NOTE — ASSESSMENT & PLAN NOTE
Patient continues losing weight  He is in the process of losing weight  Both his Jardiance and Ozempic are helping with this

## 2018-11-09 NOTE — ASSESSMENT & PLAN NOTE
Weight loss would be beneficial   Patient does follow up with pulmonologist who manages his obstructive sleep at

## 2018-11-09 NOTE — ASSESSMENT & PLAN NOTE
Quite honestly the only thing that I could not find that the patient has not had done is his pneumococcal pneumonia vaccination  Prevnar 13 provided in the office  In 1 year he will receive Pneumovax

## 2018-11-09 NOTE — PROGRESS NOTES
Subjective:      Patient ID: Aristides Gong is a 48 y o  male  53-year-old male presents with his wife as a new patient to this practice  Patient does have history of coronary artery disease, diabetes mellitus type 2, hyperlipidemia, hypertension obesity  Patient's prior PCP was Dr Jackie Diana from [facility] but he is trying to transition his care to physicians at [facility]  Cardiologist is Dr Green Blizzard  Patient does have multiple other specialists  He does see endocrinologist in [facility]  Patient is current on his lab testing  Patient does have a history of neck and back pain  He has been established with [facility]  He would like to see Dr Maria Del Carmen Menchaca for chronic neck and back pain  Patient does work as a  at [employer]  He does wear a full approved fast that does put is significant amount of pressure on his neck  Patient is current with screening colonoscopy  He see Dr Veronica Cotto who is associated with [facility]   Patient does have urologist, podiatrist, ophthalmologist   He does see all of his specialists as requested  Aside from his neck pain he has no particular complaints or concerns  He has already received flu vaccination        Past Medical History:   Diagnosis Date    CAD (coronary artery disease)     Diabetes ([address] )     Dizziness     Erectile dysfunction     GERD (gastroesophageal reflux disease)     Hyperlipidemia     Hypertension     Shortness of breath        Family History   Problem Relation Age of Onset    Coronary artery disease Family     Diabetes Family     No Known Problems Father        Past Surgical History:   Procedure Laterality Date    CARDIAC CATHETERIZATION          reports that he has never smoked  He quit smokeless tobacco use about 4 years ago  He reports that he drinks about 0 6 oz of alcohol per week         Current Outpatient Prescriptions:     amLODIPine (NORVASC) 2 5 mg tablet, Take 1 tablet (2 5 mg total) by mouth daily, Disp: 30 tablet, Rfl: 3    apixaban (ELIQUIS) 5 mg, Take 1 tablet (5 mg total) by mouth 2 (two) times a day, Disp: 56 tablet, Rfl: 0    aspirin 81 mg chewable tablet, Chew 1 tablet (81 mg total) daily, Disp: 100 tablet, Rfl: 1    benazepril (LOTENSIN) 40 MG tablet, Take 40 mg by mouth daily, Disp: , Rfl: 1    carvedilol (COREG) 12 5 mg tablet, TAKE 1 TABLET TWICE DAILY, Disp: 180 tablet, Rfl: 2    cholecalciferol (VITAMIN D3) 1,000 units tablet, Take by mouth daily, Disp: , Rfl:     co-enzyme Q-10 30 MG capsule, Take 30 mg by mouth 3 (three) times a day, Disp: , Rfl:     OZEMPIC 1 MG/DOSE SOPN, INJECT 1 MG SQ Q WEEK, Disp: , Rfl: 4    rosuvastatin (CRESTOR) 20 MG tablet, Take 20 mg by mouth, Disp: , Rfl:     SYNJARDY XR 12 5-1000 MG TB24, TK 1 T PO BID, Disp: , Rfl: 2    ONETOUCH VERIO test strip, CHECK TWICE DAILY, Disp: , Rfl: 3    The following portions of the patient's history were reviewed and updated as appropriate: allergies, current medications, past family history, past medical history, past social history, past surgical history and problem list     Review of Systems   Constitutional: Negative  HENT: Negative  Eyes: Negative  Respiratory: Negative  Cardiovascular: Negative  Negative for chest pain and leg swelling  Gastrointestinal: Negative  Endocrine: Negative  Genitourinary: Negative  Musculoskeletal: Positive for arthralgias, back pain, myalgias and neck pain  Skin: Negative  Allergic/Immunologic: Negative  Neurological: Negative  Hematological: Negative  Psychiatric/Behavioral: Negative  All other systems reviewed and are negative  Objective:    /72   Pulse 84   Ht 5' 7" (1 702 m)   Wt 107 kg (236 lb)   SpO2 98%   BMI 36 96 kg/m²      Physical Exam   Constitutional: He is oriented to person, place, and time  He appears well-developed and well-nourished  Obese   HENT:   Head: Normocephalic  Eyes: Pupils are equal, round, and reactive to light  Conjunctivae and EOM are normal  No scleral icterus  Neck: Normal range of motion  Neck supple  Cardiovascular: Normal rate, regular rhythm and normal heart sounds  No murmur heard  Pulmonary/Chest: Effort normal and breath sounds normal    Abdominal: Soft  Bowel sounds are normal    Musculoskeletal: Normal range of motion  He exhibits tenderness  Cervical back: He exhibits tenderness and pain  He exhibits normal range of motion, no bony tenderness, no swelling, no edema, no deformity and no spasm  Neurological: He is alert and oriented to person, place, and time  Psychiatric: He has a normal mood and affect  His behavior is normal  Judgment and thought content normal    Nursing note and vitals reviewed  No results found for this or any previous visit (from the past 1008 hour(s))  Assessment/Plan:    No problem-specific Assessment & Plan notes found for this encounter  Problem List Items Addressed This Visit     CAD (coronary artery disease)     Patient does routinely follow up with cardiologist   Patient does have risk factors that are very well managed  Continued weight loss would be beneficial   Continue to manage his risk factors including hypertension, diabetes, hyperlipidemia  Is on medications for this and sees multiple specialists  Hyperlipidemia     Managed by cardiologist   Patient is on Crestor 20 mg once daily  Goal LDL less than 70 given history of diabetes and heart disease         Hypertension     Seems to be well controlled on carvedilol and benazepril  Continue same  Moderate obesity     Patient continues losing weight  He is in the process of losing weight  Both his Jardiance and Ozempic are helping with this  Obstructive sleep apnea on CPAP     Weight loss would be beneficial   Patient does follow up with pulmonologist who manages his obstructive sleep at           Type 2 diabetes mellitus without complication, without long-term current use of insulin (HCC)     No results found for: HGBA1C    No results for input(s): POCGLU in the last 72 hours  Blood Sugar Average: Last 72 hrs:    Patient seems to be well managed by endocrinologist   He is presently on Synjardy and Ozempic and does follow up with endocrinologist every 3-4 months  Patient is current with diabetic foot examinations and diabetic eye examinations  There is a performed by his podiatrist and his ophthalmologist          Mami Lou - Primary     Referral to orthopedic surgery as requested by patient  Relevant Orders    Ambulatory referral to Orthopedic Surgery    Need for prophylactic vaccination against Streptococcus pneumoniae (pneumococcus)     Quite honestly the only thing that I could not find that the patient has not had done is his pneumococcal pneumonia vaccination  Prevnar 13 provided in the office  In 1 year he will receive Pneumovax           Relevant Orders    PNEUMOCOCCAL CONJUGATE VACCINE 13-VALENT GREATER THAN 6 MONTHS (Completed)

## 2018-11-09 NOTE — ASSESSMENT & PLAN NOTE
No results found for: HGBA1C    No results for input(s): POCGLU in the last 72 hours  Blood Sugar Average: Last 72 hrs:    Patient seems to be well managed by endocrinologist   He is presently on Synjardy and Ozempic and does follow up with endocrinologist every 3-4 months  Patient is current with diabetic foot examinations and diabetic eye examinations    There is a performed by his podiatrist and his ophthalmologist

## 2018-11-09 NOTE — ASSESSMENT & PLAN NOTE
Managed by cardiologist   Patient is on Crestor 20 mg once daily    Goal LDL less than 70 given history of diabetes and heart disease

## 2018-11-09 NOTE — ASSESSMENT & PLAN NOTE
Patient does routinely follow up with cardiologist   Patient does have risk factors that are very well managed  Continued weight loss would be beneficial   Continue to manage his risk factors including hypertension, diabetes, hyperlipidemia  Is on medications for this and sees multiple specialists

## 2018-11-24 NOTE — PROGRESS NOTES
Progress Note - Cardiology Office  [facility]    Kianna Jeter 48 y o  male MRN: [id number]  : 1965  Encounter: [id number]      Assessment:     1  Coronary artery disease involving native coronary artery of native heart with angina pectoris ([facility] )    2  Essential hypertension    3  Obstructive sleep apnea on CPAP    4  Type 2 diabetes mellitus without complication, without long-term current use of insulin ([place] [address] )    5  Exertional shortness of breath    6  Mixed hyperlipidemia    7  Abnormal EKG    8  Cardiomyopathy, unspecified type ([place] [address] )    9  New onset atrial fibrillation [facility])        Discussion Summary and Plan:    1  Recurrent atrial fibrillation  Unfortunately patient is back in atrial fibrillation with controlled ventricular rate  He may need to be started on sotalol or Tikosyn therapy  For which she need to be admitted  He would prefer to be admitted to [facility]  IV discussed with my colleagues at [facility] where he will be admitted and then scheduled for DC cardioversion  He definitely need to monitor for 48-72 hours  2  Coronary artery disease status post angioplasty of LAD in 2015 with a drug-eluting stent and nonobstructive disease in the right and circumflex coronary artery  Nuclear stress test was mildly abnormal with decreased LV systolic function with no clear ischemia  There was a fixed defect  May need cardiac catheterization at some point if he develops symptomatic  Currently he is totally asymptomatic  In fact he has lost weight and he is more active now  3  Dyslipidemia  He is well controlled on statin therapy  Continue statins    4  Hypertension  Patient blood pressure is much better controlled  Will continue Coreg, Lotensin  Weight loss also has helped to control blood pressure  5  Moderate obesity  Patient is advised to lose weight  Patient now follow up with endocrinology    Medications were switched this has helped to lose weight  6  Obstructive sleep apnea  On CPAP  Patient is now much compliant with CPAP machine now  He is feeling much better clinically  Patient has not been using his CPAP machine  Plan  Possible admission to [facility] for the sotalol or Tikosyn therapy and after 24 hour he will be scheduled for cardioversion and monitor for another 24 hour I need to coordinate with my team at [facility]  In the meantime will continue antithrombotic therapy no other medication as before  Further options management of atrial fibrillation discussed with patient even possibility of AFib ablation in future  He was advised to be compliant with his CPAP machine  Keep losing weight and control his blood sugars  Once he is in sinus for some time we will consider echo Doppler for LV function  Counseling :  A description of the counseling  Goals and Barriers  Patient's ability to self care: Yes  Medication side effect reviewed with patient in detail and all their questions answered to their satisfaction  HPI :     Kayla Elias is a 48y o  year old male who came for follow up  Patient who iis working as a  in [employer], history of coronary artery disease status post stent of LAD In February 2015, diabetes mellitus, metabolic syndrome, hypertension, anxiety , Status post ankle surgery And dyslipidemia, Who came for regular follow-up  During his previous admission his blood pressure was high currently his blood pressure is pretty well controlled  Denies any chest pain or any shortness of breath  No PND  No orthopnea, no other significant complaint  recently got  and came with his wife        09/07/2018  Above reviewed  Patient came for follow-up  He is doing well however on 12 lead EKG is found to be in atrial fibrillation with controlled ventricular rate  He was not even aware that he has irregular heartbeat    He was diagnosed with sleep apnea but he is not using his CPAP machine regularly  He has a past medical history significant for coronary artery disease status post angioplasty of LAD, dyslipidemia, hypertension, anxiety and moderate obesity  He also had uncontrolled diabetes mellitus which is now much better  Patient does not smoke, does not drink and no history of any drug abuse  He has no issues with GI bleeding  He has taken Effient for more than 4 years  Previously he had reaction to definity  No nausea no vomiting no PND no orthopnea no other significant complaint  now patient remembers about few weeks ago he noted his heart was fluttering and it was irregular  Patient has colonoscopy done last   Month and benign polyp was removed  10/16/2018  Above reviewed  Patient came for follow-up  Patient has lost about 30 lb  He is feeling much better  He is staying in atrial fibrillation  Nuclear stress test shows fixed inferior apical defect  EF is noted to be low  Previously has a normal LV systolic function  He is very anxious about his cardioversion  He came with his wife  No leg swelling no nausea no vomiting  Sugar is much better blood pressure is much better  No dizziness no other significant issues at this time  11/27/2018  Above reviewed  Patient came for follow-up  He is losing more weight  He is feeling much better  However unfortunately he went back in atrial fibrillation  Nuclear stress test shows LV was dilated and EF was low but no significant ischemia  Previously had normal LV systolic function  He gets very anxious  He is certainly concerned that he went back in atrial fibrillation  He is walking more he has no nausea no vomiting no PND no orthopnea he is compliant with his medications  Sugar is pretty well controlled    But his ejection fraction has been lowered in atrial fibrillation as compared to in sinus rhythm in the past       Review of Systems   Constitutional: Negative for activity change, chills, diaphoresis, fever and unexpected weight change  HENT: Negative for congestion  Eyes: Negative for discharge and redness  Respiratory: Negative for cough, chest tightness, shortness of breath and wheezing  Cardiovascular: Negative  Negative for chest pain, palpitations and leg swelling  Gastrointestinal: Negative for abdominal pain, diarrhea and nausea  Endocrine: Negative  Genitourinary: Negative for decreased urine volume and urgency  Musculoskeletal: Negative  Negative for arthralgias, back pain and gait problem  Skin: Negative for rash and wound  Allergic/Immunologic: Negative  Neurological: Negative for dizziness, seizures, syncope, weakness, light-headedness and headaches  Hematological: Negative  Psychiatric/Behavioral: Negative for agitation and confusion  The patient is nervous/anxious          Historical Information   Past Medical History:   Diagnosis Date    CAD (coronary artery disease)     Diabetes [address] )     Dizziness     Erectile dysfunction     GERD (gastroesophageal reflux disease)     Hyperlipidemia     Hypertension     Shortness of breath      Past Surgical History:   Procedure Laterality Date    CARDIAC CATHETERIZATION       History   Alcohol Use    0 6 oz/week    1 Cans of beer per week     Comment: rarely Norrine Anger drinker per allscript      History   Drug use: Unknown     History   Smoking Status    Never Smoker   Smokeless Tobacco    Former User    Quit date: 9/7/2014     Family History:   Family History   Problem Relation Age of Onset    Coronary artery disease Family     Diabetes Family     No Known Problems Father        Meds/Allergies     Allergies   Allergen Reactions    Dye [Iodinated Diagnostic Agents]        Current Outpatient Prescriptions:     amLODIPine (NORVASC) 2 5 mg tablet, Take 1 tablet (2 5 mg total) by mouth daily, Disp: 30 tablet, Rfl: 3    aspirin 81 mg chewable tablet, Chew 1 tablet (81 mg total) daily, Disp: 100 tablet, Rfl: 1    carvedilol (COREG) 12 5 mg tablet, TAKE 1 TABLET TWICE DAILY, Disp: 180 tablet, Rfl: 2    cholecalciferol (VITAMIN D3) 1,000 units tablet, Take by mouth daily, Disp: , Rfl:     co-enzyme Q-10 30 MG capsule, Take 30 mg by mouth 3 (three) times a day, Disp: , Rfl:     ONETOUCH VERIO test strip, CHECK TWICE DAILY, Disp: , Rfl: 3    OZEMPIC 1 MG/DOSE SOPN, INJECT 1 MG SQ Q WEEK, Disp: , Rfl: 4    rosuvastatin (CRESTOR) 20 MG tablet, Take 20 mg by mouth, Disp: , Rfl:     SYNJARDY XR 12 5-1000 MG TB24, TK 1 T PO BID, Disp: , Rfl: 2    apixaban (ELIQUIS) 5 mg, Take 1 tablet (5 mg total) by mouth 2 (two) times a day, Disp: 180 tablet, Rfl: 3    benazepril (LOTENSIN) 40 MG tablet, Take 1 tablet (40 mg total) by mouth daily, Disp: 90 tablet, Rfl: 3    Vitals: Blood pressure 142/96, pulse 97, height 5' 7" (1 702 m), weight 106 kg (233 lb 6 4 oz), SpO2 96 %  Body mass index is 36 56 kg/m²  Vitals:    11/27/18 1518   Weight: 106 kg (233 lb 6 4 oz)     BP Readings from Last 3 Encounters:   11/27/18 142/96   11/07/18 132/72   10/24/18 139/93         Physical Exam   Constitutional: He is oriented to person, place, and time  He appears well-developed and well-nourished  No distress  HENT:   Head: Normocephalic and atraumatic  Eyes: Pupils are equal, round, and reactive to light  Neck: Neck supple  No JVD present  No thyromegaly present  Cardiovascular: Normal rate, S1 normal, S2 normal and intact distal pulses  An irregularly irregular rhythm present  Exam reveals no gallop, no S3, no S4, no distant heart sounds and no friction rub  Murmur heard  Systolic murmur is present   S1-S2 irregular with a 2/6 ejection systolic murmur   Pulmonary/Chest: Effort normal and breath sounds normal  No respiratory distress  He has no wheezes  He has no rales  He exhibits no tenderness  Abdominal: Soft  He exhibits no distension  There is no tenderness     Musculoskeletal: He exhibits no edema or deformity  Lymphadenopathy:     He has no cervical adenopathy  Neurological: He is alert and oriented to person, place, and time  Skin: Skin is warm and dry  No rash noted  He is not diaphoretic  No pallor  Psychiatric: He has a normal mood and affect  Judgment normal      Diagnostic Studies Review Cardio:    Nuclear stress test   Nuclear stress test done on 09/26/2018 shows dilated LV EF around 30-35%  Fixed defect no clear ischemia was seen  But patient's ejection fraction has certainly decreased from previous echo    Catheterization: Cardiac catheterization in February 2015 shows significant stenosis of the LAD which was successfully stented with a drug-eluting stent and nonobstructive disease of RCA and circumflex with EF around 45-50%  ECG Report:  Comparison to prior ECGs:    6/30/2017, 12-lead EKG shows normal sinus rhythm, left differential S2 block, LVH by voltage, nonspecific ST changes  As compared to old EKG  Patient has more ST-T changes 3lead EKG done in 12/08/2017 shows normal sinus rhythm, left anterior fascicular block, ST-T changes in lateral precordial leads  Not change from old EKG  09/07/2018  Twelve lead EKG shows atrial fibrillation with heart rate 87 beats per minute  Nonspecific ST changes  As compared to previous EKG atrial fibrillation is newly noted  10/16/2018  Twelve lead EKG shows atrial fibrillation heart rate 79 beats per minute  No significant ST changes  11/27/2018 shows atrial fibrillation heart rate 79 beats per minute  ST-T changes in V5 and V6  Not change from old EKG  Dr Chey Perdomo MD [facility] [place]      "This note has been constructed using a voice recognition system  Therefore there may be syntax, spelling, and/or grammatical errors   Please call if you have any questions  "

## 2018-11-27 ENCOUNTER — OFFICE VISIT (OUTPATIENT)
Dept: CARDIOLOGY CLINIC | Facility: CLINIC | Age: 53
End: 2018-11-27
Payer: COMMERCIAL

## 2018-11-27 VITALS
DIASTOLIC BLOOD PRESSURE: 96 MMHG | OXYGEN SATURATION: 96 % | HEIGHT: 67 IN | BODY MASS INDEX: 36.63 KG/M2 | SYSTOLIC BLOOD PRESSURE: 142 MMHG | WEIGHT: 233.4 LBS | HEART RATE: 97 BPM

## 2018-11-27 DIAGNOSIS — I25.119 CORONARY ARTERY DISEASE INVOLVING NATIVE CORONARY ARTERY OF NATIVE HEART WITH ANGINA PECTORIS (HCC): ICD-10-CM

## 2018-11-27 DIAGNOSIS — I10 ESSENTIAL HYPERTENSION: ICD-10-CM

## 2018-11-27 DIAGNOSIS — I48.91 NEW ONSET ATRIAL FIBRILLATION (HCC): ICD-10-CM

## 2018-11-27 DIAGNOSIS — I42.9 CARDIOMYOPATHY, UNSPECIFIED TYPE (HCC): ICD-10-CM

## 2018-11-27 DIAGNOSIS — Z99.89 OBSTRUCTIVE SLEEP APNEA ON CPAP: ICD-10-CM

## 2018-11-27 DIAGNOSIS — G47.33 OBSTRUCTIVE SLEEP APNEA ON CPAP: ICD-10-CM

## 2018-11-27 DIAGNOSIS — E11.9 TYPE 2 DIABETES MELLITUS WITHOUT COMPLICATION, WITHOUT LONG-TERM CURRENT USE OF INSULIN (HCC): ICD-10-CM

## 2018-11-27 DIAGNOSIS — E78.2 MIXED HYPERLIPIDEMIA: ICD-10-CM

## 2018-11-27 DIAGNOSIS — R06.02 EXERTIONAL SHORTNESS OF BREATH: ICD-10-CM

## 2018-11-27 DIAGNOSIS — R94.31 ABNORMAL EKG: ICD-10-CM

## 2018-11-27 PROCEDURE — 93000 ELECTROCARDIOGRAM COMPLETE: CPT | Performed by: INTERNAL MEDICINE

## 2018-11-27 PROCEDURE — 4010F ACE/ARB THERAPY RXD/TAKEN: CPT | Performed by: FAMILY MEDICINE

## 2018-11-27 PROCEDURE — 99214 OFFICE O/P EST MOD 30 MIN: CPT | Performed by: INTERNAL MEDICINE

## 2018-11-27 RX ORDER — BENAZEPRIL HYDROCHLORIDE 40 MG/1
40 TABLET, FILM COATED ORAL DAILY
Qty: 90 TABLET | Refills: 3 | Status: SHIPPED | OUTPATIENT
Start: 2018-11-27 | End: 2019-10-10

## 2018-11-28 ENCOUNTER — TELEPHONE (OUTPATIENT)
Dept: CARDIOLOGY CLINIC | Facility: CLINIC | Age: 53
End: 2018-11-28

## 2018-11-28 NOTE — TELEPHONE ENCOUNTER
Patient called and wanted to know about his admission to the hospital to get started on sotalol   Please advise

## 2018-12-03 NOTE — TELEPHONE ENCOUNTER
Patient called again  He is still waiting for a call back in regards to admission to the hospital to administer meds    He is in A-fib

## 2018-12-05 ENCOUNTER — TELEPHONE (OUTPATIENT)
Dept: CARDIOLOGY CLINIC | Facility: CLINIC | Age: 53
End: 2018-12-05

## 2018-12-05 NOTE — TELEPHONE ENCOUNTER
Spoke to patient  [facility] does not admit patient for sotalol therapy at this time  He has a tries he can be admitted to [place] or [place]  I did text to Ep Dr Nicky Lugo and he may need to be admitted  For sotalol or Tikosyn therapy  Patient would prefer to be in [place] as he lives close there    Please refer him to our EP

## 2018-12-13 ENCOUNTER — TELEPHONE (OUTPATIENT)
Dept: CARDIOLOGY CLINIC | Facility: CLINIC | Age: 53
End: 2018-12-13

## 2018-12-13 ENCOUNTER — OFFICE VISIT (OUTPATIENT)
Dept: CARDIOLOGY CLINIC | Facility: CLINIC | Age: 53
End: 2018-12-13
Payer: COMMERCIAL

## 2018-12-13 VITALS
HEIGHT: 67 IN | DIASTOLIC BLOOD PRESSURE: 88 MMHG | HEART RATE: 81 BPM | BODY MASS INDEX: 36.88 KG/M2 | WEIGHT: 235 LBS | SYSTOLIC BLOOD PRESSURE: 136 MMHG

## 2018-12-13 DIAGNOSIS — I48.91 ATRIAL FIBRILLATION, UNSPECIFIED TYPE (HCC): Primary | ICD-10-CM

## 2018-12-13 PROCEDURE — 93000 ELECTROCARDIOGRAM COMPLETE: CPT | Performed by: PHYSICIAN ASSISTANT

## 2018-12-13 PROCEDURE — 99245 OFF/OP CONSLTJ NEW/EST HI 55: CPT | Performed by: PHYSICIAN ASSISTANT

## 2018-12-13 NOTE — PATIENT INSTRUCTIONS
1  Schedule a fib ablation and we will price out tikosyn and Sotalol, which ever is cheaper we will admit you and continue this post ablation   2   Obtain CT Pulm veins pre procedure

## 2018-12-13 NOTE — PROGRESS NOTES
EP Consult Outpatient     [facility]  1965  [id number]  HEART & VASCULAR [facility]  [address] [place] [id number]        Assessment/Plan     1  Atrial fibrillation, unspecified type ([address] )  POCT ECG    Cardiac eps/afib ablation    CT cardiac w pulmonary vein mapping    ELZBIETA   1  Persistent nonvalvular atrial fibrillation, asymptomatic    * patient is currently in rate controlled atrial fibrillation in our office with a HR of 86bpm   * he is on coreg 12 5mg PO BID which he takes for his CAD    * his CV is 4 (T2DM, HTN, CAD, NICMP) and he is on appropriate AC w/ eliquis 5mg PO BID    * he did undergo ELZBIETA w/ DCCV in October 2018 but this clearly has failed    * his EF on ELZBIETA is 40% which is likely tachycardic induced as he had a stress test in the past 90 days which showed no new ischemia nor does patient have any symptoms of angina    * at this time, after careful discussion, due to his depressed EF which is likely tachy mediated and likelihood that his functional status will improve with maintenance of NSR we will plan for cryo PVI w/ Dr Bruner Sos followed by class III medication admit depending on the cost  His renal functional is normal so can use highest doses of Sotalol or Tikosyn  However he does have a LAFB when in NSR with his QTc being 471ms to 480ms, may need to start with middle of the road dosing due to risk of prolonged QTc  Planning this strategy due to his persistent a fib      * his continued a fib is likely due to his noncompliance with his CPAP throughout the entire night due to his neck issues, he is seeing a physician for this    * we will obtain pulmonary vein CT prior to procedure which will likely happen at the end of January which patient prefers   * he was instructed to hold his AC the night before his ablation and the morning of    * he was educated by me on need for protonix start x 30 days post procedure and need for 3-4 days of inpatient monitoring while on a class III AAD  He understood all of this and was willing to proceed  History of Present Illness     HPI/INTERVAL HISTORY: Gladys Forte is a 48 y o  male with a history of obesity, persistent nonvalvular atrial fibrillation AC w/ eliquis s/p DCCV (), NICMP EF 40%, HTN, T2DM, HLD, NORA on CPAP, CAD s/p PCI to LAD (2015) who presents to the EP office today to establish care and for atrial fibrillation  management  12-13-18:   Patient was referred to the EP office by his primary Cardiologist Dr Sophy Bernard who he has been following with for 6 years  In September 2017 patient was found to be in NSR on routine follow up but in his routine follow up in September 2018 ECG demonstrated new onset atrial fibrillation w/ CVR  Patient denied having any symptoms of LH, dizziness, fatigue, palpitations or SOB  He works as a  for [employer] and has been able to carry out his work without any noted limitations  Patient denies drinking ANY alcohol but does have a diagnosis of NORA for which he is moderately compliant with his CPAP  Due to cervical neck issues which are being treated he uses his CPAP till about 4AM each night needing to take it off due to pain in his neck  Due to his continued atrial fibrillation Dr Adela Paz had patient undergo ELZBIETA w/ DCCV on 10-24-18 which revealed an EF of 40% with mild to mod LA size  Patient was successfully cardioverted to NSR w/ referral to our office for further management  After his cardioversion patient denies having any improvement in functional capacity  He has never had any cardiac surgeries in the past, no hx of MAZE, CABG or valve replacements  He has lost 40lbs this year and is excited to lose more weight  Review of Systems  ROS as noted above, otherwise 12 point review of systems was performed and is negative         Historical Information   Social History     Social History    Marital status: /Civil Union     Spouse name: N/A    Number of children: N/A    Years of education: N/A     Occupational History    Not on file       Social History Main Topics    Smoking status: Never Smoker    Smokeless tobacco: Former User     Quit date: 9/7/2014    Alcohol use 0 6 oz/week     1 Cans of beer per week      Comment: rarely /social drinker per allscript     Drug use: No    Sexual activity: Not on file     Other Topics Concern    Not on file     Social History Narrative    No narrative on file     Past Medical History:   Diagnosis Date    CAD (coronary artery disease)     Diabetes ([address] )     Dizziness     Erectile dysfunction     GERD (gastroesophageal reflux disease)     Hyperlipidemia     Hypertension     Shortness of breath      Past Surgical History:   Procedure Laterality Date    CARDIAC CATHETERIZATION       History   Alcohol Use    0 6 oz/week    1 Cans of beer per week     Comment: rarely /social drinker per allscript      History   Drug Use No     History   Smoking Status    Never Smoker   Smokeless Tobacco    Former User    Quit date: 9/7/2014     Family History   Problem Relation Age of Onset    Coronary artery disease Family     Diabetes Family     No Known Problems Father        Meds/Allergies       Current Outpatient Prescriptions:     amLODIPine (NORVASC) 2 5 mg tablet, Take 1 tablet (2 5 mg total) by mouth daily, Disp: 30 tablet, Rfl: 3    apixaban (ELIQUIS) 5 mg, Take 1 tablet (5 mg total) by mouth 2 (two) times a day, Disp: 180 tablet, Rfl: 3    aspirin 81 mg chewable tablet, Chew 1 tablet (81 mg total) daily, Disp: 100 tablet, Rfl: 1    benazepril (LOTENSIN) 40 MG tablet, Take 1 tablet (40 mg total) by mouth daily, Disp: 90 tablet, Rfl: 3    carvedilol (COREG) 12 5 mg tablet, TAKE 1 TABLET TWICE DAILY, Disp: 180 tablet, Rfl: 2    cholecalciferol (VITAMIN D3) 1,000 units tablet, Take by mouth daily, Disp: , Rfl:     co-enzyme Q-10 30 MG capsule, Take 30 mg by mouth 3 (three) times a day, Disp: , Rfl:     ONETOUCH VERIO test strip, CHECK TWICE DAILY, Disp: , Rfl: 3    OZEMPIC 1 MG/DOSE SOPN, INJECT 1 MG SQ Q WEEK, Disp: , Rfl: 4    rosuvastatin (CRESTOR) 20 MG tablet, Take 20 mg by mouth, Disp: , Rfl:     SYNJARDY XR 12 5-1000 MG TB24, TK 1 T PO BID, Disp: , Rfl: 2    Allergies   Allergen Reactions    Dye [Iodinated Diagnostic Agents]      Definity - name of contrast         Objective   Vitals: Blood pressure 136/88, pulse 81, height 5' 7" (1 702 m), weight 107 kg (235 lb)  Physical Exam   Constitutional: He is oriented to person, place, and time  He appears well-developed and well-nourished  HENT:   Head: Normocephalic and atraumatic  Eyes: Pupils are equal, round, and reactive to light  EOM are normal    Neck: Normal range of motion  Neck supple  Cardiovascular: Normal rate  An irregularly irregular rhythm present  Pulmonary/Chest: Effort normal and breath sounds normal    Abdominal: Soft  Bowel sounds are normal    Musculoskeletal: Normal range of motion  Neurological: He is alert and oriented to person, place, and time  Skin: Skin is warm and dry  Psychiatric: He has a normal mood and affect  Labs:not applicable    Imaging: I have personally reviewed pertinent reports  ECHO: No results found for this or any previous visit  CATH/STRESS TEST:   SUMMARY:  -  Stress results: Duration of exercise was 6 min and 1 sec  Target heart rate was achieved  There was resting hypertension with a hypertensive blood pressure response to stress  There was no chest pain during stress  -  ECG conclusions: The stress ECG was equivocal for ischemia  The stress ECG was non-diagnostic due to baseline left bundle branch block  -  Perfusion imaging:  There was a small, mildly severe, predominantly fixed myocardial perfusion defect of the apical wall and basal inferior walls, slightly worse on rest images, likely due to artifact   -  Gated SPECT: The calculated left ventricular ejection fraction was 32 %  There was diffuse moderately reduced myocardial thickening and motion      IMPRESSIONS: Abnormal study after maximal exercise  There was image artifact, without diagnostic evidence for perfusion abnormality   Left ventricular systolic function was reduced, without distinct regional wall motion abnormalities        EKG:   Atrial fibrillation w/ CVR

## 2018-12-13 NOTE — TELEPHONE ENCOUNTER
Dofetilide  500 mcg  90 day #180-$75 00  30 day # 60-$25 00    Sotalol 120 mg   90 day #180-$7 27  30 day # 60 $5 24  ===View-only below this line===    ----- Message -----  From: Raghav Ceron  Sent: 12/13/2018   1:12 PM  To: Belén Lundberg, *    This is the pt    That was odd it did not attach the first time, my apologies! Please price Dofetilide 500mcg and Sotalol 120mg for this pt  Pt has afib abl scheduled with Dr Jody Fernández 1/22/2019 and will be admitted post procedure for whichever is less expensive  Thanks!

## 2018-12-27 DIAGNOSIS — I25.119 CORONARY ARTERY DISEASE INVOLVING NATIVE CORONARY ARTERY OF NATIVE HEART WITH ANGINA PECTORIS (HCC): ICD-10-CM

## 2018-12-27 DIAGNOSIS — I48.91 NEW ONSET ATRIAL FIBRILLATION (HCC): ICD-10-CM

## 2019-01-03 ENCOUNTER — HOSPITAL ENCOUNTER (OUTPATIENT)
Dept: CT IMAGING | Facility: HOSPITAL | Age: 54
Discharge: HOME | End: 2019-01-03
Payer: COMMERCIAL

## 2019-01-03 DIAGNOSIS — I48.91 ATRIAL FIBRILLATION, UNSPECIFIED TYPE (HCC): ICD-10-CM

## 2019-01-03 LAB
ALBUMIN SERPL-MCNC: 4.4 G/DL (ref 3.6–5.1)
ALBUMIN/GLOB SERPL: 2.1 (CALC) (ref 1–2.5)
ALP SERPL-CCNC: 80 U/L (ref 40–115)
ALT SERPL-CCNC: 9 U/L (ref 9–46)
AST SERPL-CCNC: 10 U/L (ref 10–35)
BASOPHILS # BLD AUTO: 60 CELLS/UL (ref 0–200)
BASOPHILS NFR BLD AUTO: 0.7 %
BILIRUB SERPL-MCNC: 0.7 MG/DL (ref 0.2–1.2)
BUN SERPL-MCNC: 22 MG/DL (ref 7–25)
BUN/CREAT SERPL: ABNORMAL (CALC) (ref 6–22)
CALCIUM SERPL-MCNC: 9.3 MG/DL (ref 8.6–10.3)
CHLORIDE SERPL-SCNC: 104 MMOL/L (ref 98–110)
CO2 SERPL-SCNC: 29 MMOL/L (ref 20–32)
CREAT SERPL-MCNC: 0.94 MG/DL (ref 0.7–1.33)
EOSINOPHIL # BLD AUTO: 68 CELLS/UL (ref 15–500)
EOSINOPHIL NFR BLD AUTO: 0.8 %
ERYTHROCYTE [DISTWIDTH] IN BLOOD BY AUTOMATED COUNT: 13.3 % (ref 11–15)
GLOBULIN SER CALC-MCNC: 2.1 G/DL (CALC) (ref 1.9–3.7)
GLUCOSE SERPL-MCNC: 104 MG/DL (ref 65–99)
HCT VFR BLD AUTO: 49.3 % (ref 38.5–50)
HGB BLD-MCNC: 16.4 G/DL (ref 13.2–17.1)
INR PPP: 1
LYMPHOCYTES # BLD AUTO: 1785 CELLS/UL (ref 850–3900)
LYMPHOCYTES NFR BLD AUTO: 21 %
MCH RBC QN AUTO: 27.4 PG (ref 27–33)
MCHC RBC AUTO-ENTMCNC: 33.3 G/DL (ref 32–36)
MCV RBC AUTO: 82.4 FL (ref 80–100)
MONOCYTES # BLD AUTO: 850 CELLS/UL (ref 200–950)
MONOCYTES NFR BLD AUTO: 10 %
NEUTROPHILS # BLD AUTO: 5738 CELLS/UL (ref 1500–7800)
NEUTROPHILS NFR BLD AUTO: 67.5 %
PLATELET # BLD AUTO: 203 THOUSAND/UL (ref 140–400)
PMV BLD REES-ECKER: 11.5 FL (ref 7.5–12.5)
POTASSIUM SERPL-SCNC: 3.9 MMOL/L (ref 3.5–5.3)
PROT SERPL-MCNC: 6.5 G/DL (ref 6.1–8.1)
PROTHROMBIN TIME: 10.4 SEC (ref 9–11.5)
RBC # BLD AUTO: 5.98 MILLION/UL (ref 4.2–5.8)
SL AMB EGFR AFRICAN AMERICAN: 108 ML/MIN/1.73M2
SL AMB EGFR NON AFRICAN AMERICAN: 93 ML/MIN/1.73M2
SODIUM SERPL-SCNC: 140 MMOL/L (ref 135–146)
WBC # BLD AUTO: 8.5 THOUSAND/UL (ref 3.8–10.8)

## 2019-01-03 PROCEDURE — 75572 CT HRT W/3D IMAGE: CPT

## 2019-01-03 RX ADMIN — IODIXANOL 120 ML: 320 INJECTION, SOLUTION INTRAVASCULAR at 07:52

## 2019-01-09 ENCOUNTER — DOCUMENTATION (OUTPATIENT)
Dept: CARDIOLOGY CLINIC | Facility: CLINIC | Age: 54
End: 2019-01-09

## 2019-01-09 NOTE — PROGRESS NOTES
Dominik Flynn, I am not sure if you have reviewed this pts CT for  PV mapping that was completed last week  The report notes an accidental finding of mild ectasia of the ascending aorta measuring 4 1cm   pt is scheduled for abl 1/22/2019 with Dr Katie Mcmahan   Is this of concern prior to the abl?

## 2019-01-15 ENCOUNTER — TELEPHONE (OUTPATIENT)
Dept: CARDIOLOGY CLINIC | Facility: CLINIC | Age: 54
End: 2019-01-15

## 2019-01-15 NOTE — TELEPHONE ENCOUNTER
Pt called-has much concern about the phone call he rec'd about the incidental finding on his Cardiac CT  Pt says he does not want to see a CRNP to follow up on the results of the CT for his "inoperable aneurysm"  I asked him who called him with the info-he was contacted by CT Surgery  I called there and spoke with Ryan Zambrano who said she spoke with the patient about his results and it is a "non-urgent, non-surgical" aneurysm at this point, and says the patient will be calling her back to schedule after his ablation that is scheduled for 1/22/19  The results of this CT were reviewed with Tien Palma who says pt should F/U with CT Surgery about this, but should proceed with the ablation first     I called the patient and reiterated that this incidental finding is something he will be seen in CT Surgery to discuss and to answer any questions that he may have, and that it is non-urgent and non-surgical at this time, meaning he does not need surgery at this time  Pt is concerned about having his ablation on Tuesday  I told him that everything was reviewed, and he is OK to have his ablation done  Pt verbalized understanding at the time of our phone call

## 2019-01-21 ENCOUNTER — ANESTHESIA EVENT (OUTPATIENT)
Dept: ANESTHESIOLOGY | Facility: HOSPITAL | Age: 54
End: 2019-01-21

## 2019-01-21 ENCOUNTER — ANESTHESIA (OUTPATIENT)
Dept: ANESTHESIOLOGY | Facility: HOSPITAL | Age: 54
End: 2019-01-21

## 2019-01-21 ENCOUNTER — DOCUMENTATION (OUTPATIENT)
Dept: CARDIOLOGY CLINIC | Facility: CLINIC | Age: 54
End: 2019-01-21

## 2019-01-21 DIAGNOSIS — I48.91 ATRIAL FIBRILLATION, UNSPECIFIED TYPE (HCC): Primary | ICD-10-CM

## 2019-01-22 ENCOUNTER — ANESTHESIA EVENT (OUTPATIENT)
Dept: NON INVASIVE DIAGNOSTICS | Facility: HOSPITAL | Age: 54
DRG: 271 | End: 2019-01-22
Payer: COMMERCIAL

## 2019-01-22 ENCOUNTER — HOSPITAL ENCOUNTER (INPATIENT)
Dept: NON INVASIVE DIAGNOSTICS | Facility: HOSPITAL | Age: 54
LOS: 2 days | Discharge: HOME | DRG: 271 | End: 2019-01-25
Attending: INTERNAL MEDICINE | Admitting: INTERNAL MEDICINE
Payer: COMMERCIAL

## 2019-01-22 ENCOUNTER — APPOINTMENT (OUTPATIENT)
Dept: NON INVASIVE DIAGNOSTICS | Facility: HOSPITAL | Age: 54
End: 2019-01-22
Payer: COMMERCIAL

## 2019-01-22 DIAGNOSIS — R00.0 INAPPROPRIATE SINUS TACHYCARDIA: Primary | ICD-10-CM

## 2019-01-22 DIAGNOSIS — I48.91 ATRIAL FIBRILLATION, UNSPECIFIED TYPE (HCC): ICD-10-CM

## 2019-01-22 DIAGNOSIS — I48.19 PERSISTENT ATRIAL FIBRILLATION (HCC): ICD-10-CM

## 2019-01-22 LAB
ANION GAP SERPL CALCULATED.3IONS-SCNC: 8 MMOL/L (ref 4–13)
ATRIAL RATE: 89 BPM
ATRIAL RATE: 98 BPM
BASOPHILS # BLD AUTO: 0.07 THOUSANDS/ΜL (ref 0–0.1)
BASOPHILS NFR BLD AUTO: 1 % (ref 0–1)
BUN SERPL-MCNC: 18 MG/DL (ref 5–25)
CALCIUM SERPL-MCNC: 8.5 MG/DL (ref 8.3–10.1)
CHLORIDE SERPL-SCNC: 105 MMOL/L (ref 100–108)
CO2 SERPL-SCNC: 25 MMOL/L (ref 21–32)
CREAT SERPL-MCNC: 0.91 MG/DL (ref 0.6–1.3)
EOSINOPHIL # BLD AUTO: 0.1 THOUSAND/ΜL (ref 0–0.61)
EOSINOPHIL NFR BLD AUTO: 1 % (ref 0–6)
ERYTHROCYTE [DISTWIDTH] IN BLOOD BY AUTOMATED COUNT: 13.1 % (ref 11.6–15.1)
GFR SERPL CREATININE-BSD FRML MDRD: 97 ML/MIN/1.73SQ M
GLUCOSE P FAST SERPL-MCNC: 116 MG/DL (ref 65–99)
GLUCOSE SERPL-MCNC: 116 MG/DL (ref 65–140)
GLUCOSE SERPL-MCNC: 118 MG/DL (ref 65–140)
GLUCOSE SERPL-MCNC: 133 MG/DL (ref 65–140)
GLUCOSE SERPL-MCNC: 97 MG/DL (ref 65–140)
HCT VFR BLD AUTO: 50.5 % (ref 36.5–49.3)
HGB BLD-MCNC: 16.5 G/DL (ref 12–17)
IMM GRANULOCYTES # BLD AUTO: 0.03 THOUSAND/UL (ref 0–0.2)
IMM GRANULOCYTES NFR BLD AUTO: 0 % (ref 0–2)
INR PPP: 1.01 (ref 0.86–1.17)
KCT BLD-ACNC: 121 SEC (ref 89–137)
KCT BLD-ACNC: 236 SEC (ref 89–137)
KCT BLD-ACNC: 297 SEC (ref 89–137)
KCT BLD-ACNC: 297 SEC (ref 89–137)
KCT BLD-ACNC: 303 SEC (ref 89–137)
KCT BLD-ACNC: 316 SEC (ref 89–137)
KCT BLD-ACNC: 329 SEC (ref 89–137)
LYMPHOCYTES # BLD AUTO: 2.02 THOUSANDS/ΜL (ref 0.6–4.47)
LYMPHOCYTES NFR BLD AUTO: 20 % (ref 14–44)
MCH RBC QN AUTO: 27.4 PG (ref 26.8–34.3)
MCHC RBC AUTO-ENTMCNC: 32.7 G/DL (ref 31.4–37.4)
MCV RBC AUTO: 84 FL (ref 82–98)
MONOCYTES # BLD AUTO: 1.07 THOUSAND/ΜL (ref 0.17–1.22)
MONOCYTES NFR BLD AUTO: 10 % (ref 4–12)
NEUTROPHILS # BLD AUTO: 6.99 THOUSANDS/ΜL (ref 1.85–7.62)
NEUTS SEG NFR BLD AUTO: 68 % (ref 43–75)
NRBC BLD AUTO-RTO: 0 /100 WBCS
P AXIS: 18 DEGREES
PLATELET # BLD AUTO: 212 THOUSANDS/UL (ref 149–390)
PMV BLD AUTO: 11.1 FL (ref 8.9–12.7)
POTASSIUM SERPL-SCNC: 3.4 MMOL/L (ref 3.5–5.3)
PR INTERVAL: 179 MS
PROTHROMBIN TIME: 13.4 SECONDS (ref 11.8–14.2)
QRS AXIS: -45 DEGREES
QRS AXIS: -62 DEGREES
QRSD INTERVAL: 118 MS
QRSD INTERVAL: 121 MS
QT INTERVAL: 386 MS
QT INTERVAL: 404 MS
QTC INTERVAL: 492 MS
QTC INTERVAL: 516 MS
RBC # BLD AUTO: 6.02 MILLION/UL (ref 3.88–5.62)
SODIUM SERPL-SCNC: 138 MMOL/L (ref 136–145)
SPECIMEN SOURCE: ABNORMAL
SPECIMEN SOURCE: NORMAL
T WAVE AXIS: 60 DEGREES
T WAVE AXIS: 86 DEGREES
VENTRICULAR RATE: 98 BPM
VENTRICULAR RATE: 98 BPM
WBC # BLD AUTO: 10.28 THOUSAND/UL (ref 4.31–10.16)

## 2019-01-22 PROCEDURE — 85610 PROTHROMBIN TIME: CPT | Performed by: PHYSICIAN ASSISTANT

## 2019-01-22 PROCEDURE — 93613 INTRACARDIAC EPHYS 3D MAPG: CPT | Performed by: PHYSICIAN ASSISTANT

## 2019-01-22 PROCEDURE — 93662 INTRACARDIAC ECG (ICE): CPT | Performed by: PHYSICIAN ASSISTANT

## 2019-01-22 PROCEDURE — C2630 CATH, EP, COOL-TIP: HCPCS | Performed by: PHYSICIAN ASSISTANT

## 2019-01-22 PROCEDURE — 93623 PRGRMD STIMJ&PACG IV RX NFS: CPT | Performed by: PHYSICIAN ASSISTANT

## 2019-01-22 PROCEDURE — 93613 INTRACARDIAC EPHYS 3D MAPG: CPT | Performed by: INTERNAL MEDICINE

## 2019-01-22 PROCEDURE — 93005 ELECTROCARDIOGRAM TRACING: CPT

## 2019-01-22 PROCEDURE — 93655 ICAR CATH ABLTJ DSCRT ARRHYT: CPT | Performed by: PHYSICIAN ASSISTANT

## 2019-01-22 PROCEDURE — 92960 CARDIOVERSION ELECTRIC EXT: CPT | Performed by: PHYSICIAN ASSISTANT

## 2019-01-22 PROCEDURE — C1894 INTRO/SHEATH, NON-LASER: HCPCS | Performed by: PHYSICIAN ASSISTANT

## 2019-01-22 PROCEDURE — C1769 GUIDE WIRE: HCPCS | Performed by: PHYSICIAN ASSISTANT

## 2019-01-22 PROCEDURE — 85025 COMPLETE CBC W/AUTO DIFF WBC: CPT | Performed by: PHYSICIAN ASSISTANT

## 2019-01-22 PROCEDURE — 85347 COAGULATION TIME ACTIVATED: CPT

## 2019-01-22 PROCEDURE — 93010 ELECTROCARDIOGRAM REPORT: CPT | Performed by: INTERNAL MEDICINE

## 2019-01-22 PROCEDURE — 93655 ICAR CATH ABLTJ DSCRT ARRHYT: CPT | Performed by: INTERNAL MEDICINE

## 2019-01-22 PROCEDURE — C1730 CATH, EP, 19 OR FEW ELECT: HCPCS | Performed by: PHYSICIAN ASSISTANT

## 2019-01-22 PROCEDURE — C1726 CATH, BAL DIL, NON-VASCULAR: HCPCS | Performed by: PHYSICIAN ASSISTANT

## 2019-01-22 PROCEDURE — C1893 INTRO/SHEATH, FIXED,NON-PEEL: HCPCS | Performed by: PHYSICIAN ASSISTANT

## 2019-01-22 PROCEDURE — 025T3ZZ DESTRUCTION OF LEFT PULMONARY VEIN, PERCUTANEOUS APPROACH: ICD-10-PCS | Performed by: INTERNAL MEDICINE

## 2019-01-22 PROCEDURE — 80048 BASIC METABOLIC PNL TOTAL CA: CPT | Performed by: PHYSICIAN ASSISTANT

## 2019-01-22 PROCEDURE — 93656 COMPRE EP EVAL ABLTJ ATR FIB: CPT | Performed by: INTERNAL MEDICINE

## 2019-01-22 PROCEDURE — 025S3ZZ DESTRUCTION OF RIGHT PULMONARY VEIN, PERCUTANEOUS APPROACH: ICD-10-PCS | Performed by: INTERNAL MEDICINE

## 2019-01-22 PROCEDURE — 82948 REAGENT STRIP/BLOOD GLUCOSE: CPT

## 2019-01-22 PROCEDURE — 93656 COMPRE EP EVAL ABLTJ ATR FIB: CPT | Performed by: PHYSICIAN ASSISTANT

## 2019-01-22 PROCEDURE — C1759 CATH, INTRA ECHOCARDIOGRAPHY: HCPCS | Performed by: PHYSICIAN ASSISTANT

## 2019-01-22 PROCEDURE — 93312 ECHO TRANSESOPHAGEAL: CPT

## 2019-01-22 PROCEDURE — 93662 INTRACARDIAC ECG (ICE): CPT | Performed by: INTERNAL MEDICINE

## 2019-01-22 RX ORDER — AMLODIPINE BESYLATE 2.5 MG/1
2.5 TABLET ORAL DAILY
Status: DISCONTINUED | OUTPATIENT
Start: 2019-01-22 | End: 2019-01-25

## 2019-01-22 RX ORDER — MIDAZOLAM HYDROCHLORIDE 1 MG/ML
INJECTION INTRAMUSCULAR; INTRAVENOUS AS NEEDED
Status: DISCONTINUED | OUTPATIENT
Start: 2019-01-22 | End: 2019-01-22 | Stop reason: SURG

## 2019-01-22 RX ORDER — CARVEDILOL 12.5 MG/1
12.5 TABLET ORAL 2 TIMES DAILY
Status: DISCONTINUED | OUTPATIENT
Start: 2019-01-22 | End: 2019-01-23

## 2019-01-22 RX ORDER — FENTANYL CITRATE/PF 50 MCG/ML
25 SYRINGE (ML) INJECTION
Status: DISCONTINUED | OUTPATIENT
Start: 2019-01-22 | End: 2019-01-25 | Stop reason: HOSPADM

## 2019-01-22 RX ORDER — LIDOCAINE HYDROCHLORIDE 10 MG/ML
INJECTION, SOLUTION INFILTRATION; PERINEURAL AS NEEDED
Status: DISCONTINUED | OUTPATIENT
Start: 2019-01-22 | End: 2019-01-22 | Stop reason: SURG

## 2019-01-22 RX ORDER — FENTANYL CITRATE 50 UG/ML
INJECTION, SOLUTION INTRAMUSCULAR; INTRAVENOUS AS NEEDED
Status: DISCONTINUED | OUTPATIENT
Start: 2019-01-22 | End: 2019-01-22 | Stop reason: SURG

## 2019-01-22 RX ORDER — LABETALOL 20 MG/4 ML (5 MG/ML) INTRAVENOUS SYRINGE
10 ONCE AS NEEDED
Status: DISCONTINUED | OUTPATIENT
Start: 2019-01-22 | End: 2019-01-25 | Stop reason: HOSPADM

## 2019-01-22 RX ORDER — ADENOSINE 3 MG/ML
INJECTION INTRAVENOUS CODE/TRAUMA/SEDATION MEDICATION
Status: COMPLETED | OUTPATIENT
Start: 2019-01-22 | End: 2019-01-22

## 2019-01-22 RX ORDER — ACETAMINOPHEN 325 MG/1
650 TABLET ORAL EVERY 4 HOURS PRN
Status: DISCONTINUED | OUTPATIENT
Start: 2019-01-22 | End: 2019-01-25 | Stop reason: HOSPADM

## 2019-01-22 RX ORDER — PROTAMINE SULFATE 10 MG/ML
INJECTION, SOLUTION INTRAVENOUS AS NEEDED
Status: DISCONTINUED | OUTPATIENT
Start: 2019-01-22 | End: 2019-01-22 | Stop reason: SURG

## 2019-01-22 RX ORDER — LISINOPRIL 20 MG/1
40 TABLET ORAL DAILY
Status: DISCONTINUED | OUTPATIENT
Start: 2019-01-22 | End: 2019-01-25 | Stop reason: HOSPADM

## 2019-01-22 RX ORDER — ONDANSETRON 2 MG/ML
INJECTION INTRAMUSCULAR; INTRAVENOUS AS NEEDED
Status: DISCONTINUED | OUTPATIENT
Start: 2019-01-22 | End: 2019-01-22 | Stop reason: SURG

## 2019-01-22 RX ORDER — ASPIRIN 81 MG/1
81 TABLET, CHEWABLE ORAL DAILY
Status: DISCONTINUED | OUTPATIENT
Start: 2019-01-23 | End: 2019-01-25 | Stop reason: HOSPADM

## 2019-01-22 RX ORDER — OXYCODONE HYDROCHLORIDE 5 MG/1
5 TABLET ORAL EVERY 4 HOURS PRN
Status: DISCONTINUED | OUTPATIENT
Start: 2019-01-22 | End: 2019-01-25 | Stop reason: HOSPADM

## 2019-01-22 RX ORDER — SODIUM CHLORIDE 9 MG/ML
INJECTION, SOLUTION INTRAVENOUS CONTINUOUS PRN
Status: DISCONTINUED | OUTPATIENT
Start: 2019-01-22 | End: 2019-01-22 | Stop reason: SURG

## 2019-01-22 RX ORDER — SODIUM CHLORIDE 9 MG/ML
50 INJECTION, SOLUTION INTRAVENOUS CONTINUOUS
Status: DISCONTINUED | OUTPATIENT
Start: 2019-01-22 | End: 2019-01-25 | Stop reason: HOSPADM

## 2019-01-22 RX ORDER — SUCCINYLCHOLINE CHLORIDE 20 MG/ML
INJECTION INTRAMUSCULAR; INTRAVENOUS AS NEEDED
Status: DISCONTINUED | OUTPATIENT
Start: 2019-01-22 | End: 2019-01-22 | Stop reason: SURG

## 2019-01-22 RX ORDER — HEPARIN SODIUM 10000 [USP'U]/100ML
INJECTION, SOLUTION INTRAVENOUS
Status: COMPLETED | OUTPATIENT
Start: 2019-01-22 | End: 2019-01-22

## 2019-01-22 RX ORDER — ALBUTEROL SULFATE 2.5 MG/3ML
2.5 SOLUTION RESPIRATORY (INHALATION) ONCE AS NEEDED
Status: DISCONTINUED | OUTPATIENT
Start: 2019-01-22 | End: 2019-01-22

## 2019-01-22 RX ORDER — METOCLOPRAMIDE HYDROCHLORIDE 5 MG/ML
10 INJECTION INTRAMUSCULAR; INTRAVENOUS ONCE AS NEEDED
Status: DISCONTINUED | OUTPATIENT
Start: 2019-01-22 | End: 2019-01-25 | Stop reason: HOSPADM

## 2019-01-22 RX ORDER — PANTOPRAZOLE SODIUM 40 MG/1
40 TABLET, DELAYED RELEASE ORAL
Status: DISCONTINUED | OUTPATIENT
Start: 2019-01-23 | End: 2019-01-25 | Stop reason: HOSPADM

## 2019-01-22 RX ORDER — ATORVASTATIN CALCIUM 40 MG/1
40 TABLET, FILM COATED ORAL
Status: DISCONTINUED | OUTPATIENT
Start: 2019-01-22 | End: 2019-01-25 | Stop reason: HOSPADM

## 2019-01-22 RX ORDER — DIPHENHYDRAMINE HYDROCHLORIDE 50 MG/ML
50 INJECTION INTRAMUSCULAR; INTRAVENOUS ONCE
Status: COMPLETED | OUTPATIENT
Start: 2019-01-22 | End: 2019-01-22

## 2019-01-22 RX ORDER — PROPOFOL 10 MG/ML
INJECTION, EMULSION INTRAVENOUS AS NEEDED
Status: DISCONTINUED | OUTPATIENT
Start: 2019-01-22 | End: 2019-01-22 | Stop reason: SURG

## 2019-01-22 RX ORDER — HEPARIN SODIUM 1000 [USP'U]/ML
INJECTION, SOLUTION INTRAVENOUS; SUBCUTANEOUS CODE/TRAUMA/SEDATION MEDICATION
Status: COMPLETED | OUTPATIENT
Start: 2019-01-22 | End: 2019-01-22

## 2019-01-22 RX ORDER — ONDANSETRON 2 MG/ML
4 INJECTION INTRAMUSCULAR; INTRAVENOUS ONCE AS NEEDED
Status: DISCONTINUED | OUTPATIENT
Start: 2019-01-22 | End: 2019-01-23

## 2019-01-22 RX ORDER — DOFETILIDE 0.5 MG/1
500 CAPSULE ORAL EVERY 12 HOURS SCHEDULED
Status: DISCONTINUED | OUTPATIENT
Start: 2019-01-22 | End: 2019-01-25 | Stop reason: HOSPADM

## 2019-01-22 RX ADMIN — AMLODIPINE BESYLATE 2.5 MG: 2.5 TABLET ORAL at 15:51

## 2019-01-22 RX ADMIN — DOFETILIDE 500 MCG: 0.5 CAPSULE ORAL at 18:07

## 2019-01-22 RX ADMIN — FENTANYL CITRATE 50 MCG: 50 INJECTION, SOLUTION INTRAMUSCULAR; INTRAVENOUS at 12:20

## 2019-01-22 RX ADMIN — IOHEXOL 80 ML: 350 INJECTION, SOLUTION INTRAVENOUS at 12:20

## 2019-01-22 RX ADMIN — FAMOTIDINE 20 MG: 10 INJECTION, SOLUTION INTRAVENOUS at 07:36

## 2019-01-22 RX ADMIN — HEPARIN SODIUM 6000 UNITS: 1000 INJECTION INTRAVENOUS; SUBCUTANEOUS at 09:59

## 2019-01-22 RX ADMIN — LISINOPRIL 40 MG: 20 TABLET ORAL at 15:51

## 2019-01-22 RX ADMIN — FENTANYL CITRATE 50 MCG: 50 INJECTION, SOLUTION INTRAMUSCULAR; INTRAVENOUS at 08:26

## 2019-01-22 RX ADMIN — MIDAZOLAM 2 MG: 1 INJECTION INTRAMUSCULAR; INTRAVENOUS at 08:15

## 2019-01-22 RX ADMIN — FENTANYL CITRATE 25 MCG: 50 INJECTION, SOLUTION INTRAMUSCULAR; INTRAVENOUS at 11:37

## 2019-01-22 RX ADMIN — HEPARIN SODIUM 2000 UNITS: 1000 INJECTION INTRAVENOUS; SUBCUTANEOUS at 11:36

## 2019-01-22 RX ADMIN — PROTAMINE SULFATE 70 MG: 10 INJECTION, SOLUTION INTRAVENOUS at 12:05

## 2019-01-22 RX ADMIN — FENTANYL CITRATE 25 MCG: 50 INJECTION, SOLUTION INTRAMUSCULAR; INTRAVENOUS at 10:48

## 2019-01-22 RX ADMIN — FENTANYL CITRATE 50 MCG: 50 INJECTION, SOLUTION INTRAMUSCULAR; INTRAVENOUS at 09:35

## 2019-01-22 RX ADMIN — HEPARIN SODIUM 3000 UNITS/HR: 10000 INJECTION, SOLUTION INTRAVENOUS at 09:34

## 2019-01-22 RX ADMIN — Medication 2000 MG: at 08:45

## 2019-01-22 RX ADMIN — SODIUM CHLORIDE: 0.9 INJECTION, SOLUTION INTRAVENOUS at 07:55

## 2019-01-22 RX ADMIN — PROPOFOL 140 MG: 10 INJECTION, EMULSION INTRAVENOUS at 08:20

## 2019-01-22 RX ADMIN — DIPHENHYDRAMINE HYDROCHLORIDE 50 MG: 50 INJECTION, SOLUTION INTRAMUSCULAR; INTRAVENOUS at 07:55

## 2019-01-22 RX ADMIN — LIDOCAINE HYDROCHLORIDE 50 MG: 10 INJECTION, SOLUTION INFILTRATION; PERINEURAL at 08:20

## 2019-01-22 RX ADMIN — ONDANSETRON 4 MG: 2 INJECTION INTRAMUSCULAR; INTRAVENOUS at 12:13

## 2019-01-22 RX ADMIN — ADENOSINE 18 MG: 3 INJECTION, SOLUTION INTRAVENOUS at 11:54

## 2019-01-22 RX ADMIN — HEPARIN SODIUM 12000 UNITS: 1000 INJECTION INTRAVENOUS; SUBCUTANEOUS at 09:32

## 2019-01-22 RX ADMIN — PHENYLEPHRINE HYDROCHLORIDE 25 MCG/MIN: 10 INJECTION INTRAVENOUS at 08:55

## 2019-01-22 RX ADMIN — HYDROCORTISONE SODIUM SUCCINATE 100 MG: 100 INJECTION, POWDER, FOR SOLUTION INTRAMUSCULAR; INTRAVENOUS at 07:30

## 2019-01-22 RX ADMIN — HEPARIN SODIUM 3000 UNITS: 1000 INJECTION INTRAVENOUS; SUBCUTANEOUS at 10:18

## 2019-01-22 RX ADMIN — SUCCINYLCHOLINE CHLORIDE 100 MG: 20 INJECTION, SOLUTION INTRAMUSCULAR; INTRAVENOUS at 08:20

## 2019-01-22 RX ADMIN — APIXABAN 5 MG: 5 TABLET, FILM COATED ORAL at 18:07

## 2019-01-22 RX ADMIN — ADENOSINE 24 MG: 3 INJECTION, SOLUTION INTRAVENOUS at 11:58

## 2019-01-22 RX ADMIN — CARVEDILOL 12.5 MG: 12.5 TABLET, FILM COATED ORAL at 18:07

## 2019-01-22 RX ADMIN — ATORVASTATIN CALCIUM 40 MG: 40 TABLET, FILM COATED ORAL at 15:51

## 2019-01-22 RX ADMIN — FENTANYL CITRATE 25 MCG: 50 INJECTION, SOLUTION INTRAMUSCULAR; INTRAVENOUS at 13:25

## 2019-01-22 RX ADMIN — ACETAMINOPHEN 650 MG: 325 TABLET, FILM COATED ORAL at 16:18

## 2019-01-22 NOTE — ANESTHESIA POSTPROCEDURE EVALUATION
Post-Op Assessment Note      CV Status:  Stable    Mental Status:  Awake and somnolent    Hydration Status:  Euvolemic    PONV Controlled:  Controlled    Airway Patency:  Patent    Post Op Vitals Reviewed: Yes          Staff: CRNA, Anesthesiologist       Comments: Patient resting in PACU  Answering questions appropriately -- no c/o pain or nausea  Airway patent, VSS             BP  129/88 (101)   Temp   96 5    Pulse   99    Resp   16   SpO2   95% on FM

## 2019-01-22 NOTE — H&P
H&P Exam - Cardiology   Tye Jeter 48 y o  male MRN: [id number]  Unit/Bed#: SSC 01-01 Encounter: [id number]    Assessment/Plan     Assessment:  1  Persistent atrial fibrillation   A ) CHADS2 Vasc = 4, on Eliquis   B ) currently rate controlled with coreg   C ) prior failed cardioversion 10/2018  2  Likely NICM with LVEF 35-40% per ELZBIETA 10/2018   A ) nonischemic stress test 9/2018  3  CAD status post prior PCI to LAD in 2015  4  Hypertension  5  Hyperlipidemia  6  Obesity   7  Diabetes mellitus type 2  8  Obstructive sleep apnea, on CPAP    Plan:  1  Cryoablation with pulmonary vein isolation, followed by admission for antiarrhythmic therapy  History of Present Illness   HPI:  Kerrie Dow is a 48y o  year old male with a history of CAD status post prior PCI to LAD, hypertension, hyperlipidemia, DM2, NORA on CPAP, and obesity  He typically follows with Dr Ravi Rodrigues  He was incidentally found to be in persistent atrial fibrillation 9/2018  He subsequently underwent ELZBIETA/CV 10/2018, however he unfortunately reverted back to atrial fibrillation  Originally, admission for Sotalol was discussed, however prior to this he was seen in consultation by EP on 12/13/2018  Given his reduced LVEF, an ablation was recommended to help restore normal sinus rhythm  He presents to undergo that procedure, and then will likely be admitted for Tikosyn versus Sotalol initiation  Review of Systems  ROS as noted above, otherwise 12 point review of systems was performed and is negative         Historical Information   Past Medical History:   Diagnosis Date    CAD (coronary artery disease)     Diabetes ([address] )     Dizziness     Erectile dysfunction     GERD (gastroesophageal reflux disease)     Hyperlipidemia     Hypertension     Shortness of breath      Past Surgical History:   Procedure Laterality Date    CARDIAC CATHETERIZATION       Family History:   Family History   Problem Relation Age of Onset    Coronary artery disease Family     Diabetes Family     No Known Problems Father        Social History   History   Alcohol Use    0 6 oz/week    1 Cans of beer per week     Comment: rarely /social drinker per allscript      History   Drug Use No     History   Smoking Status    Never Smoker   Smokeless Tobacco    Former User    Quit date: 9/7/2014         Meds/Allergies   all medications and allergies reviewed  Home Medications:   Prescriptions Prior to Admission   Medication    amLODIPine (NORVASC) 2 5 mg tablet    apixaban (ELIQUIS) 5 mg    aspirin 81 mg chewable tablet    benazepril (LOTENSIN) 40 MG tablet    carvedilol (COREG) 12 5 mg tablet    cholecalciferol (VITAMIN D3) 1,000 units tablet    co-enzyme Q-10 30 MG capsule    ONETOUCH VERIO test strip    OZEMPIC 1 MG/DOSE SOPN    rosuvastatin (CRESTOR) 20 MG tablet    SYNJARDY XR 12 5-1000 MG TB24       Allergies   Allergen Reactions    Dye [Iodinated Diagnostic Agents]      Definity - name of contrast         Objective   Vitals: Blood pressure 153/95, pulse 101, temperature 97 5 °F (36 4 °C), temperature source Temporal, resp  rate 20, height 5' 7" (1 702 m), weight 107 kg (235 lb 14 3 oz), SpO2 97 %    Orthostatic Blood Pressures      Most Recent Value   Blood Pressure  153/95 filed at 01/22/2019 0705          No intake or output data in the 24 hours ending 01/22/19 0750    Invasive Devices     Peripheral Intravenous Line            Peripheral IV 01/22/19 Left Hand less than 1 day    Peripheral IV 01/22/19 Right Antecubital less than 1 day                Physical Exam  GEN: NAD, alert and oriented, well appearing  SKIN: dry without significant lesions or rashes  HEENT: NCAT, PERRL, EOMs intact  NECK: No JVD appreciated  CARDIOVASCULAR: irregularly irregular  LUNGS: no audible wheezing, no respiratory distress, good effort  ABDOMEN: Soft, nontender, nondistended  EXTREMITIES/VASCULAR: perfused without clubbing, cyanosis  PSYCH: Normal mood and affect  NEURO: CN ll-Xll grossly intact      Lab Results: I have personally reviewed pertinent lab results  Results from last 7 days  Lab Units 01/22/19  0638   WBC Thousand/uL 10 28*   HEMOGLOBIN g/dL 16 5   HEMATOCRIT % 50 5*   PLATELETS Thousands/uL 212       Results from last 7 days  Lab Units 01/22/19  0638   POTASSIUM mmol/L 3 4*   CHLORIDE mmol/L 105   CO2 mmol/L 25   BUN mg/dL 18   CREATININE mg/dL 0 91   CALCIUM mg/dL 8 5       Results from last 7 days  Lab Units 01/22/19  0638   INR  1 01           Imaging: I have personally reviewed pertinent reports  ELZBIETA 10/2018: SUMMARY     LEFT VENTRICLE:  Size was at the upper limits of normal   Systolic function was moderately reduced  Ejection fraction was estimated in the range of 35 % to 40 % to be 40 %  There was moderate diffuse hypokinesis  Wall thickness was mildly increased  There was mild concentric hypertrophy      LEFT ATRIUM:  The atrium was mildly to moderately dilated      ATRIAL SEPTUM:  No defect or patent foramen ovale was identified      MITRAL VALVE:  There was mild regurgitation      AORTIC VALVE:  There was trace regurgitation      TRICUSPID VALVE:  There was mild regurgitation          EKG: atrial fibrillation, heart rate 98 bpm        Code Status: No Order

## 2019-01-22 NOTE — ANESTHESIA PREPROCEDURE EVALUATION
Review of Systems/Medical History  Patient summary reviewed  Chart reviewed      Cardiovascular  EKG reviewed, Exercise tolerance (METS): >4,  Hyperlipidemia, Hypertension , CAD , CAD status: obstructive and 1VD, Cardiac stents drug eluting and > 1 year History of percutaneous transluminal coronary angioplasty, Dysrhythmias , atrial fibrillation, CHF , NYHA Classification: II compensated CHF,   Comment: HFrEF,  Pulmonary  Shortness of breath, Sleep apnea CPAP,        GI/Hepatic    GERD well controlled,             Endo/Other  Diabetes well controlled type 2 Oral agent,   Obesity  morbid obesity   GYN       Hematology  Negative hematology ROS      Musculoskeletal  Negative musculoskeletal ROS        Neurology  Negative neurology ROS      Psychology   Negative psychology ROS              Physical Exam    Airway    Mallampati score: II  TM Distance: >3 FB  Neck ROM: full     Dental       Cardiovascular  Rhythm: regular, Rate: normal,     Pulmonary  Breath sounds clear to auscultation,     Other Findings        Anesthesia Plan  ASA Score- 3     Anesthesia Type- general with ASA Monitors  Additional Monitors:   Airway Plan: ETT  Plan Factors-    Induction- intravenous  Postoperative Plan-     Informed Consent- Anesthetic plan and risks discussed with patient  I personally reviewed this patient with the CRNA  Discussed and agreed on the Anesthesia Plan with the CRNA  Chino Boland

## 2019-01-22 NOTE — ANESTHESIA PREPROCEDURE EVALUATION
Review of Systems/Medical History  Patient summary reviewed  Chart reviewed      Cardiovascular  EKG reviewed, Exercise tolerance (METS): >4,  Hyperlipidemia, Hypertension , CAD , CAD status: obstructive and 1VD, Cardiac stents drug eluting and > 1 year History of percutaneous transluminal coronary angioplasty, Dysrhythmias , atrial fibrillation, CHF , NYHA Classification: II compensated CHF,   Comment: HFrEF,  Pulmonary  Shortness of breath, Sleep apnea CPAP,        GI/Hepatic    GERD well controlled,             Endo/Other  Diabetes well controlled type 2 Oral agent,   Obesity  morbid obesity   GYN       Hematology   Musculoskeletal       Neurology   Psychology           Physical Exam    Airway    Mallampati score: II  TM Distance: >3 FB  Neck ROM: full     Dental       Cardiovascular  Rhythm: regular, Rate: normal,     Pulmonary  Breath sounds clear to auscultation,     Other Findings        Anesthesia Plan  ASA Score- 3     Anesthesia Type-     Plan Factors-    Induction- intravenous  Postoperative Plan-     Informed Consent- Anesthetic plan and risks discussed with patient

## 2019-01-22 NOTE — PERIOPERATIVE NURSING NOTE
Patient complains of left chest pain and pressure  Pt complains of numbness and tingling to bilateral lower extremities, patient states that he does not experience this at baseline  Dr Jaleesa Rudd notified, at bedside to evaluate patient   No further orders

## 2019-01-23 LAB
ANION GAP SERPL CALCULATED.3IONS-SCNC: 5 MMOL/L (ref 4–13)
ATRIAL RATE: 100 BPM
ATRIAL RATE: 107 BPM
ATRIAL RATE: 107 BPM
BUN SERPL-MCNC: 18 MG/DL (ref 5–25)
CALCIUM SERPL-MCNC: 8.4 MG/DL (ref 8.3–10.1)
CHLORIDE SERPL-SCNC: 107 MMOL/L (ref 100–108)
CO2 SERPL-SCNC: 27 MMOL/L (ref 21–32)
CREAT SERPL-MCNC: 0.75 MG/DL (ref 0.6–1.3)
ERYTHROCYTE [DISTWIDTH] IN BLOOD BY AUTOMATED COUNT: 13.2 % (ref 11.6–15.1)
GFR SERPL CREATININE-BSD FRML MDRD: 106 ML/MIN/1.73SQ M
GLUCOSE SERPL-MCNC: 121 MG/DL (ref 65–140)
GLUCOSE SERPL-MCNC: 124 MG/DL (ref 65–140)
GLUCOSE SERPL-MCNC: 129 MG/DL (ref 65–140)
GLUCOSE SERPL-MCNC: 132 MG/DL (ref 65–140)
GLUCOSE SERPL-MCNC: 135 MG/DL (ref 65–140)
HCT VFR BLD AUTO: 45.4 % (ref 36.5–49.3)
HGB BLD-MCNC: 14.6 G/DL (ref 12–17)
MCH RBC QN AUTO: 27.3 PG (ref 26.8–34.3)
MCHC RBC AUTO-ENTMCNC: 32.2 G/DL (ref 31.4–37.4)
MCV RBC AUTO: 85 FL (ref 82–98)
P AXIS: -7 DEGREES
P AXIS: -9 DEGREES
P AXIS: 1 DEGREES
PLATELET # BLD AUTO: 164 THOUSANDS/UL (ref 149–390)
PMV BLD AUTO: 11.2 FL (ref 8.9–12.7)
POTASSIUM SERPL-SCNC: 3.3 MMOL/L (ref 3.5–5.3)
PR INTERVAL: 180 MS
PR INTERVAL: 180 MS
PR INTERVAL: 184 MS
QRS AXIS: -46 DEGREES
QRS AXIS: -46 DEGREES
QRS AXIS: -47 DEGREES
QRSD INTERVAL: 120 MS
QT INTERVAL: 372 MS
QT INTERVAL: 374 MS
QT INTERVAL: 380 MS
QTC INTERVAL: 490 MS
QTC INTERVAL: 496 MS
QTC INTERVAL: 499 MS
RBC # BLD AUTO: 5.34 MILLION/UL (ref 3.88–5.62)
SODIUM SERPL-SCNC: 139 MMOL/L (ref 136–145)
T WAVE AXIS: 71 DEGREES
T WAVE AXIS: 74 DEGREES
T WAVE AXIS: 83 DEGREES
VENTRICULAR RATE: 100 BPM
VENTRICULAR RATE: 107 BPM
VENTRICULAR RATE: 107 BPM
WBC # BLD AUTO: 10.96 THOUSAND/UL (ref 4.31–10.16)

## 2019-01-23 PROCEDURE — 82948 REAGENT STRIP/BLOOD GLUCOSE: CPT

## 2019-01-23 PROCEDURE — 93005 ELECTROCARDIOGRAM TRACING: CPT

## 2019-01-23 PROCEDURE — 93010 ELECTROCARDIOGRAM REPORT: CPT | Performed by: INTERNAL MEDICINE

## 2019-01-23 PROCEDURE — 85027 COMPLETE CBC AUTOMATED: CPT | Performed by: PHYSICIAN ASSISTANT

## 2019-01-23 PROCEDURE — 99232 SBSQ HOSP IP/OBS MODERATE 35: CPT | Performed by: PHYSICIAN ASSISTANT

## 2019-01-23 PROCEDURE — 80048 BASIC METABOLIC PNL TOTAL CA: CPT | Performed by: PHYSICIAN ASSISTANT

## 2019-01-23 RX ORDER — METOPROLOL TARTRATE 5 MG/5ML
5 INJECTION INTRAVENOUS EVERY 6 HOURS PRN
Status: DISCONTINUED | OUTPATIENT
Start: 2019-01-23 | End: 2019-01-25 | Stop reason: HOSPADM

## 2019-01-23 RX ORDER — CARVEDILOL 25 MG/1
25 TABLET ORAL 2 TIMES DAILY
Status: DISCONTINUED | OUTPATIENT
Start: 2019-01-23 | End: 2019-01-24

## 2019-01-23 RX ORDER — CARVEDILOL 12.5 MG/1
12.5 TABLET ORAL ONCE
Status: COMPLETED | OUTPATIENT
Start: 2019-01-23 | End: 2019-01-23

## 2019-01-23 RX ADMIN — CARVEDILOL 12.5 MG: 12.5 TABLET, FILM COATED ORAL at 09:23

## 2019-01-23 RX ADMIN — AMLODIPINE BESYLATE 2.5 MG: 2.5 TABLET ORAL at 09:22

## 2019-01-23 RX ADMIN — DOFETILIDE 500 MCG: 0.5 CAPSULE ORAL at 20:13

## 2019-01-23 RX ADMIN — APIXABAN 5 MG: 5 TABLET, FILM COATED ORAL at 09:22

## 2019-01-23 RX ADMIN — ATORVASTATIN CALCIUM 40 MG: 40 TABLET, FILM COATED ORAL at 17:39

## 2019-01-23 RX ADMIN — APIXABAN 5 MG: 5 TABLET, FILM COATED ORAL at 17:40

## 2019-01-23 RX ADMIN — ACETAMINOPHEN 650 MG: 325 TABLET, FILM COATED ORAL at 09:33

## 2019-01-23 RX ADMIN — ASPIRIN 81 MG 81 MG: 81 TABLET ORAL at 09:22

## 2019-01-23 RX ADMIN — DOFETILIDE 500 MCG: 0.5 CAPSULE ORAL at 06:33

## 2019-01-23 RX ADMIN — PANTOPRAZOLE SODIUM 40 MG: 40 TABLET, DELAYED RELEASE ORAL at 06:33

## 2019-01-23 RX ADMIN — OXYCODONE HYDROCHLORIDE 5 MG: 5 TABLET ORAL at 19:15

## 2019-01-23 RX ADMIN — LISINOPRIL 40 MG: 20 TABLET ORAL at 09:23

## 2019-01-23 RX ADMIN — CARVEDILOL 12.5 MG: 12.5 TABLET, FILM COATED ORAL at 15:24

## 2019-01-23 RX ADMIN — CARVEDILOL 25 MG: 25 TABLET, FILM COATED ORAL at 17:39

## 2019-01-23 NOTE — UTILIZATION REVIEW
Initial Clinical Review    Admission: Date/Time/Statement: 1/22/19 @ 0908   Orders Placed This Encounter   Procedures    Inpatient Admission     Standing Status:   Standing     Number of Occurrences:   1     Order Specific Question:   Admitting Physician     Answer:   Sebastián Saunders [[id number]]     Order Specific Question:   Level of Care     Answer:   Med Surg [[id number]]     Order Specific Question:   Estimated length of stay     Answer:   More than 2 Midnights     Order Specific Question:   Certification     Answer:   I certify that inpatient services are medically necessary for this patient for a duration of greater than two midnights  See H&P and MD Progress Notes for additional information about the patient's course of treatment  Chief Complaint:  Afib     History of Illness: Gentry Cornejo is a 48y o  year old male with a history of CAD status post prior PCI to LAD, hypertension, hyperlipidemia, DM2, NORA on CPAP, and obesity  He typically follows with Dr Laurie Mehta  He was incidentally found to be in persistent atrial fibrillation 9/2018  He subsequently underwent ELZBIETA/CV 10/2018, however he unfortunately reverted back to atrial fibrillation  Originally, admission for Sotalol was discussed, however prior to this he was seen in consultation by EP on 12/13/2018  Given his reduced LVEF, an ablation was recommended to help restore normal sinus rhythm  He presents to undergo that procedure, and then will likely be admitted for Tikosyn versus Sotalol initiation          Vital Signs:   Temperature Pulse Respirations Blood Pressure SpO2   01/22/19 0705 01/22/19 0705 01/22/19 0705 01/22/19 0705 01/22/19 0705   97 5 °F (36 4 °C) 101 20 153/95 97 %      Temp Source Heart Rate Source Patient Position - Orthostatic VS BP Location FiO2 (%)   01/22/19 0705 01/22/19 0705 01/22/19 1900 01/22/19 1900 --   Temporal Monitor Lying Left arm       Pain Score       01/22/19 1300       5        Wt Readings from Last 1 Encounters: 01/22/19 107 kg (235 lb 14 3 oz)     Vital Signs (abnormal):   Date/Time  Temp  Pulse  Resp  BP  SpO2  O2 Device  Patient Position - Orthostatic VS   01/23/19 0624  97 7 °F (36 5 °C)   106  20  136/90  95 %  None (Room air)  Sitting   01/22/19 2300  98 °F (36 7 °C)  101  18  138/82  98 %  None (Room air)  Lying   01/22/19 1900  --  102  17  141/88  96 %  None (Room air)  Lying   01/22/19 1845  --  104  --  128/86  --  --  --   01/22/19 1745  --   106  --  129/86  --  --  --   01/22/19 1718  --  --  --  --  97 %  None (Room air)  --   01/22/19 1645  --  102  --  136/84  --  --  --   01/22/19 1545  --  103  --  129/84  --  --  --   01/22/19 1515  --  98  --  127/85  --  --  --   01/22/19 1445  --  103  --  124/82  --  --  --   01/22/19 1430  --  104  --  124/82  97 %  None (Room air)  --   01/22/19 1415  --  100  --  119/74  96 %  Nasal cannula  --   01/22/19 1400  --  103  18  119/74  95 %  Nasal cannula  --   01/22/19 1345  --  100  21  131/87  92 %  Nasal cannula  --   01/22/19 1330   97 3 °F (36 3 °C)  100  16  129/84  97 %  Nasal cannula         Pertinent Labs/Diagnostic Test Results:  1/22 - K 3 4, Glu 116 - Wbc 10 28 - H/H 16 5/50 5    1/23 - K 3 3 - Wbc 10 96      Past Medical/Surgical History:   Diagnosis    CAD (coronary artery disease)    Diabetes (HCC)    Dizziness    Erectile dysfunction    GERD (gastroesophageal reflux disease)    Hyperlipidemia    Hypertension    Shortness of breath   Cardiac Catheterization       Admitting Diagnosis: Atrial fibrillation, unspecified type ([facility] [address] ) [I48 [id number]]  Age/Sex: 48 y o  male     Assessment/Plan: 47 yo m  With persistent afib -  CHADS2 vasc  -  4 , on Eliquis - Currently rate controlled with Coreg - Prio failed cardioversion  10/2018 -   Likely NICM with LVEF 35-40 %  Per ELZBIETA 10/2018 - nonsichemic stress 9/2018 - CAD s/p prior  PCI to LAD on 2015 - HTn - HLD - Obesity - DM2 - NORA on Cpap hs     Plan Cryoablation with pulmonary vein isolation    Admission Orders:  Scheduled Meds:   Current Facility-Administered Medications:  acetaminophen 650 mg Oral Q4H PRN 1/22 x 1    amLODIPine 2 5 mg Oral Daily    apixaban 5 mg Oral BID    aspirin 81 mg Oral Daily    atorvastatin 40 mg Oral Daily With Dinner    carvedilol 12 5 mg Oral BID    dofetilide 500 mcg Oral Q12H SAMMY    fentaNYL 25 mcg Intravenous Q3 min PRN    insulin lispro 1-5 Units Subcutaneous HS    insulin lispro 1-6 Units Subcutaneous TID AC    Labetalol HCl 10 mg Intravenous Once PRN    lisinopril 40 mg Oral Daily    metoclopramide 10 mg Intravenous Once PRN    ondansetron 4 mg Intravenous Once PRN    oxyCODONE 5 mg Oral Q4H PRN    pantoprazole 40 mg Oral Early Morning      Continuous Infusions:   sodium chloride 50 mL/hr      Nursing orders - VS q 4 - telem - Continuous ST segment monitoring - Puncture site care -  Continuous pulse ox monitoring - Diet Cardiac     Procedure -  On 1/22 -    PROCEDURES PERFORMED   1  Atrial fibrillation ablation using Cryoablation  2  Cavotricuspid Isthmus Right atrial flutter ablation  3  3-D map with NAVX   4  Intracardiac Echocardiography ICE  5  Left atrial pacing and recording          ANESTHESIA General anesthesia  EPS RESULTS:   HV:37ms  AV WENCHEBACH:520ms  AVN ERP: 600/420ms  V PACING: No VA conduction   PLAN Postoperative monitoring ovrnight  Resume oral anticoagulation in 4 hours  Follow-up in 2 weeks, adjustment of rhythm control medications  Recommend ant-acid treatment for one month  Resume Eliquis   Start DOfetilide tonight and continue 3 day admission

## 2019-01-23 NOTE — PROGRESS NOTES
Progress Note - Electrophysiology-Cardiology (EP)   Irina Jeter 48 y o  male MRN: [id number]  Unit/Bed#: CW2 218-02 Encounter: [id number]      Assessment/Plan:   1  Persistent nonvalvular atrial fibrillation, symptomatic    * EF 40% on ELZBIETA   * s/p failed cardioversion in October 2018 w/ return to a fib  * he is now s/p post CTI RFA and cryo PVI  On 1-22-19 by Dr Matti Cheek   * he was initiated on tikosyn on 1-22-19 pm and has received 2/5 doses  He is in sinus tachycardia    * baseline QTc was 480ms with ECG showing QTc of 499ms but upon review seems to be around 440ms   * no PVC'S/NSVT on tele    * continue meds as directed   * continue eliquis for AC and CPAP     2  Sinus tachycardia    * ? Etiology of this    * he is having no pain    * he has no leukocytosis    * he is afebrile    * will increase coreg to 25mg PO BID and re-assess in the AM      Subjective/Objective   Subjective:   KWAN is a 48year old male w/ obesity, persistent nonvalvular atrial fibrillation AC w/ eliquis s/p DCCV (), NICMP EF 40%, HTN, T2DM, HLD, NORA on CPAP, CAD s/p PCI to LAD (2015) who is HOD#1 s/p CTI RFA and cryo PVI by Dr Matti Cheek  He was admitted to EP service post procedure for tikosyn loading  1-23-19: This AM he is having no concerns or complaints  He denies any palpitations, LH, dizziness or chest discomfort       Vitals: /90 (BP Location: Left arm)   Pulse (!) 106   Temp 97 7 °F (36 5 °C) (Oral)   Resp 20   Ht 5' 7" (1 702 m)   Wt 107 kg (235 lb 14 3 oz)   SpO2 95%   BMI 36 95 kg/m²   Vitals:    01/22/19 0705   Weight: 107 kg (235 lb 14 3 oz)     Orthostatic Blood Pressures      Most Recent Value   Blood Pressure  136/90 filed at 01/23/2019 5278   Patient Position - Orthostatic VS  Sitting filed at 01/23/2019 4255            Intake/Output Summary (Last 24 hours) at 01/23/19 1303  Last data filed at 01/22/19 7629   Gross per 24 hour   Intake              300 ml   Output              250 ml   Net 50 ml       Invasive Devices     Peripheral Intravenous Line            Peripheral IV 01/22/19 Right Antecubital 1 day                            Scheduled Meds:  Current Facility-Administered Medications:  acetaminophen 650 mg Oral Q4H PRN Pending sale to [facility], PA-C   amLODIPine 2 5 mg Oral Daily Pending sale to [facility], PA-C   apixaban 5 mg Oral BID Pending sale to [facility], PA-C   aspirin 81 mg Oral Daily Pending sale to [facility], PA-C   atorvastatin 40 mg Oral Daily With Tirso, PA-SAMUEL   carvedilol 12 5 mg Oral BID Pending sale to [facility], PA-C   dofetilide 500 mcg Oral Q12H [facility], PA-C   fentaNYL 25 mcg Intravenous Q3 min PRN Yvetta Fetch, CRNA   insulin lispro 1-5 Units Subcutaneous HS Pending sale to [facility], PA-C   insulin lispro 1-6 Units Subcutaneous TID AC Pending sale to [facility], PA-C   Labetalol HCl 10 mg Intravenous Once PRN Yvetta Fetch, CRNA   lisinopril 40 mg Oral Daily Pending sale to [facility], PA-C   metoclopramide 10 mg Intravenous Once PRN Yvetta Fetch, CRNA   oxyCODONE 5 mg Oral Q4H PRN Pending sale to [facility], PA-C   pantoprazole 40 mg Oral Early Morning Pending sale to [facility], PA-C   sodium chloride 50 mL/hr Intravenous Continuous Yvetta Fetch, CRNA     Continuous Infusions:  sodium chloride 50 mL/hr     PRN Meds:   acetaminophen    fentaNYL    Labetalol HCl    metoclopramide    oxyCODONE    Physical Exam:   GEN: NAD, alert and oriented, well appearing  SKIN: dry without significant lesions or rashes  HEENT: NCAT, PERRL, EOMs intact  NECK: No JVD or carotid bruits appreciated  CARDIOVASCULAR: tachycardic, regular rhythm, normal S1, S2 without murmurs, rubs, or gallops appreciated  LUNGS: Clear to auscultation bilaterally without wheezes, rhonchi, or rales  ABDOMEN: Soft, nontender, nondistended  EXTREMITIES/VASCULAR: perfused without clubbing, cyanosis, or edema b/l  PSYCH: Normal mood and affect  NEURO: CN ll-Xll grossly intact                Lab Results: I have personally reviewed pertinent lab results  Results from last 7 days  Lab Units 01/23/19  0633 01/22/19  0638   WBC Thousand/uL 10 96* 10 28*   HEMOGLOBIN g/dL 14 6 16 5   HEMATOCRIT % 45 4 50 5*   PLATELETS Thousands/uL 164 212       Results from last 7 days  Lab Units 01/23/19  0633 01/22/19  0638   POTASSIUM mmol/L 3 3* 3 4*   CHLORIDE mmol/L 107 105   CO2 mmol/L 27 25   BUN mg/dL 18 18   CREATININE mg/dL 0 75 0 91   CALCIUM mg/dL 8 4 8 5       Results from last 7 days  Lab Units 01/22/19  0638   INR  1 01             Imaging: I have personally reviewed pertinent reports  No results found for this or any previous visit      EKG:

## 2019-01-24 LAB
ANION GAP SERPL CALCULATED.3IONS-SCNC: 6 MMOL/L (ref 4–13)
ATRIAL RATE: 107 BPM
ATRIAL RATE: 119 BPM
BASOPHILS # BLD AUTO: 0.04 THOUSANDS/ΜL (ref 0–0.1)
BASOPHILS NFR BLD AUTO: 0 % (ref 0–1)
BUN SERPL-MCNC: 13 MG/DL (ref 5–25)
CALCIUM SERPL-MCNC: 8.1 MG/DL (ref 8.3–10.1)
CHLORIDE SERPL-SCNC: 106 MMOL/L (ref 100–108)
CO2 SERPL-SCNC: 27 MMOL/L (ref 21–32)
CREAT SERPL-MCNC: 0.7 MG/DL (ref 0.6–1.3)
EOSINOPHIL # BLD AUTO: 0.06 THOUSAND/ΜL (ref 0–0.61)
EOSINOPHIL NFR BLD AUTO: 1 % (ref 0–6)
ERYTHROCYTE [DISTWIDTH] IN BLOOD BY AUTOMATED COUNT: 13.2 % (ref 11.6–15.1)
GFR SERPL CREATININE-BSD FRML MDRD: 109 ML/MIN/1.73SQ M
GLUCOSE SERPL-MCNC: 106 MG/DL (ref 65–140)
GLUCOSE SERPL-MCNC: 117 MG/DL (ref 65–140)
GLUCOSE SERPL-MCNC: 117 MG/DL (ref 65–140)
GLUCOSE SERPL-MCNC: 132 MG/DL (ref 65–140)
GLUCOSE SERPL-MCNC: 166 MG/DL (ref 65–140)
HCT VFR BLD AUTO: 43.9 % (ref 36.5–49.3)
HGB BLD-MCNC: 14 G/DL (ref 12–17)
IMM GRANULOCYTES # BLD AUTO: 0.03 THOUSAND/UL (ref 0–0.2)
IMM GRANULOCYTES NFR BLD AUTO: 0 % (ref 0–2)
LYMPHOCYTES # BLD AUTO: 2.03 THOUSANDS/ΜL (ref 0.6–4.47)
LYMPHOCYTES NFR BLD AUTO: 20 % (ref 14–44)
MAGNESIUM SERPL-MCNC: 2.4 MG/DL (ref 1.6–2.6)
MCH RBC QN AUTO: 27.3 PG (ref 26.8–34.3)
MCHC RBC AUTO-ENTMCNC: 31.9 G/DL (ref 31.4–37.4)
MCV RBC AUTO: 86 FL (ref 82–98)
MONOCYTES # BLD AUTO: 1.43 THOUSAND/ΜL (ref 0.17–1.22)
MONOCYTES NFR BLD AUTO: 14 % (ref 4–12)
NEUTROPHILS # BLD AUTO: 6.79 THOUSANDS/ΜL (ref 1.85–7.62)
NEUTS SEG NFR BLD AUTO: 65 % (ref 43–75)
NRBC BLD AUTO-RTO: 0 /100 WBCS
PLATELET # BLD AUTO: 153 THOUSANDS/UL (ref 149–390)
PMV BLD AUTO: 11.8 FL (ref 8.9–12.7)
POTASSIUM SERPL-SCNC: 3.2 MMOL/L (ref 3.5–5.3)
QRS AXIS: -45 DEGREES
QRS AXIS: -45 DEGREES
QRSD INTERVAL: 116 MS
QRSD INTERVAL: 122 MS
QT INTERVAL: 360 MS
QT INTERVAL: 370 MS
QTC INTERVAL: 482 MS
QTC INTERVAL: 493 MS
RBC # BLD AUTO: 5.12 MILLION/UL (ref 3.88–5.62)
SODIUM SERPL-SCNC: 139 MMOL/L (ref 136–145)
T WAVE AXIS: 103 DEGREES
T WAVE AXIS: 83 DEGREES
VENTRICULAR RATE: 102 BPM
VENTRICULAR RATE: 113 BPM
WBC # BLD AUTO: 10.38 THOUSAND/UL (ref 4.31–10.16)

## 2019-01-24 PROCEDURE — 93010 ELECTROCARDIOGRAM REPORT: CPT | Performed by: INTERNAL MEDICINE

## 2019-01-24 PROCEDURE — 85025 COMPLETE CBC W/AUTO DIFF WBC: CPT | Performed by: INTERNAL MEDICINE

## 2019-01-24 PROCEDURE — 99233 SBSQ HOSP IP/OBS HIGH 50: CPT | Performed by: INTERNAL MEDICINE

## 2019-01-24 PROCEDURE — 80048 BASIC METABOLIC PNL TOTAL CA: CPT | Performed by: INTERNAL MEDICINE

## 2019-01-24 PROCEDURE — 83735 ASSAY OF MAGNESIUM: CPT | Performed by: INTERNAL MEDICINE

## 2019-01-24 PROCEDURE — 93005 ELECTROCARDIOGRAM TRACING: CPT

## 2019-01-24 PROCEDURE — 82948 REAGENT STRIP/BLOOD GLUCOSE: CPT

## 2019-01-24 RX ORDER — POTASSIUM CHLORIDE 20 MEQ/1
40 TABLET, EXTENDED RELEASE ORAL
Status: DISPENSED | OUTPATIENT
Start: 2019-01-24 | End: 2019-01-24

## 2019-01-24 RX ORDER — POTASSIUM CHLORIDE 20 MEQ/1
20 TABLET, EXTENDED RELEASE ORAL 2 TIMES DAILY
Status: DISCONTINUED | OUTPATIENT
Start: 2019-01-24 | End: 2019-01-25

## 2019-01-24 RX ORDER — METOPROLOL SUCCINATE 100 MG/1
100 TABLET, EXTENDED RELEASE ORAL 2 TIMES DAILY
Status: DISCONTINUED | OUTPATIENT
Start: 2019-01-24 | End: 2019-01-25

## 2019-01-24 RX ADMIN — APIXABAN 5 MG: 5 TABLET, FILM COATED ORAL at 09:31

## 2019-01-24 RX ADMIN — METOPROLOL SUCCINATE 100 MG: 100 TABLET, EXTENDED RELEASE ORAL at 18:02

## 2019-01-24 RX ADMIN — POTASSIUM CHLORIDE 40 MEQ: 1500 TABLET, EXTENDED RELEASE ORAL at 12:20

## 2019-01-24 RX ADMIN — LISINOPRIL 40 MG: 20 TABLET ORAL at 09:31

## 2019-01-24 RX ADMIN — ATORVASTATIN CALCIUM 40 MG: 40 TABLET, FILM COATED ORAL at 18:03

## 2019-01-24 RX ADMIN — APIXABAN 5 MG: 5 TABLET, FILM COATED ORAL at 18:03

## 2019-01-24 RX ADMIN — ASPIRIN 81 MG 81 MG: 81 TABLET ORAL at 09:31

## 2019-01-24 RX ADMIN — CARVEDILOL 25 MG: 25 TABLET, FILM COATED ORAL at 09:31

## 2019-01-24 RX ADMIN — DOFETILIDE 500 MCG: 0.5 CAPSULE ORAL at 07:36

## 2019-01-24 RX ADMIN — POTASSIUM CHLORIDE 20 MEQ: 1500 TABLET, EXTENDED RELEASE ORAL at 18:03

## 2019-01-24 RX ADMIN — PANTOPRAZOLE SODIUM 40 MG: 40 TABLET, DELAYED RELEASE ORAL at 06:06

## 2019-01-24 RX ADMIN — AMLODIPINE BESYLATE 2.5 MG: 2.5 TABLET ORAL at 09:31

## 2019-01-24 RX ADMIN — DOFETILIDE 500 MCG: 0.5 CAPSULE ORAL at 18:49

## 2019-01-24 NOTE — PROGRESS NOTES
Progress Note - Electrophysiology-Cardiology (EP)   Kellie Jeter 48 y o  male MRN: [id number]  Unit/Bed#: [facility] 510-01 Encounter: [id number]      Assessment:  1  Persistent atrial fibrillation status post cryoablation with pulmonary vein isolation and typical atrial flutter line 1/22/2018, now with recurrent atrial fibrillation              A ) CHADS2 Vasc = 4, on Eliquis              B ) rates currently in the high 90s to 100              C ) prior failed cardioversion 10/2018  2  Likely NICM with LVEF 35-40% per ELZBIETA 10/2018              A ) nonischemic stress test 9/2018  3  CAD status post prior PCI to LAD in 2015  4  Hypertension  5  Hyperlipidemia  6  Obesity   7  Diabetes mellitus type 2  8  Obstructive sleep apnea, on CPAP    Plan:  1  He now has recurrent atrial fibrillation following his ablation  I reviewed these findings with Dr Riri Laguna  We will schedule a cardioversion tomorrow, 1/25/2018  NPO at midnight, recheck a m labs  2  His rates are elevated currently, and he was even tachycardic when in sinus rhythm following the procedure  Will change Coreg to Toprol- mg twice daily, 1st dose tonight  3   Continue Tikosyn antiarrhythmic therapy, with EKGs 2 hours after each dose  4  His potassium was low today, replete and recheck in the morning  5  Depending on his rhythm and rate following his cardioversion, may need to consider outpatient correlate or given his heart failure and persistent sinus tachycardia  Subjective/Objective   Chief Complaint:  No acute complaints    Subjective:  Patient currently doing well, resting comfortably in his chair  He denies chest pain or pressure, shortness of breath, dizziness, lightheadedness, or palpitations  He is currently unaware of being back in atrial fibrillation      Objective:     Vitals: /85   Pulse 91   Temp 98 9 °F (37 2 °C)   Resp 18   Ht 5' 7" (1 702 m)   Wt 106 kg (233 lb 7 5 oz)   SpO2 97%   BMI 36 57 kg/m² Vitals:    01/22/19 0705 01/23/19 1651   Weight: 107 kg (235 lb 14 3 oz) 106 kg (233 lb 7 5 oz)     Orthostatic Blood Pressures      Most Recent Value   Blood Pressure  138/85 filed at 01/24/2019 1100   Patient Position - Orthostatic VS  Lying filed at 01/23/2019 1908            Intake/Output Summary (Last 24 hours) at 01/24/19 1152  Last data filed at 01/24/19 0900   Gross per 24 hour   Intake              120 ml   Output              900 ml   Net             -780 ml       Invasive Devices     Peripheral Intravenous Line            Peripheral IV 01/22/19 Right Antecubital 2 days                            Scheduled Meds:  Current Facility-Administered Medications:  acetaminophen 650 mg Oral Q4H PRN Tayler Balderas PA-C   amLODIPine 2 5 mg Oral Daily Tayler Balderas PA-C   apixaban 5 mg Oral BID Tayler Balderas PA-C   aspirin 81 mg Oral Daily Tayler Balderas PA-C   atorvastatin 40 mg Oral Daily With KANE Chahal   dofetilide 500 mcg Oral Q12H [address] Tayler Balderas PA-C   fentaNYL 25 mcg Intravenous Q3 min PRN Ilah Shallow, CRNA   insulin lispro 1-5 Units Subcutaneous HS Tayler Balderas PA-C   insulin lispro 1-6 Units Subcutaneous TID AC Tayler Balderas PA-C   Labetalol HCl 10 mg Intravenous Once PRN Ilah Shallow, CRNA   lisinopril 40 mg Oral Daily Tayler Balderas PA-C   metoclopramide 10 mg Intravenous Once PRN Ilah Shallow, CRNA   metoprolol 5 mg Intravenous Q6H PRN Cori Rich MD   metoprolol succinate 100 mg Oral BID Tayler Balderas PA-C   oxyCODONE 5 mg Oral Q4H PRN Tayler Balderas PA-C   pantoprazole 40 mg Oral Early Morning Tayler Balderas PA-C   potassium chloride 40 mEq Oral Q2H Tayler Balderas PA-C   sodium chloride 50 mL/hr Intravenous Continuous Ilah Shallow, CRNA     Continuous Infusions:  sodium chloride 50 mL/hr     PRN Meds:   acetaminophen    fentaNYL    Labetalol HCl    metoclopramide    metoprolol    oxyCODONE    Review of Systems: Cardiovascular ROS: negative for - chest pain, palpitations or shortness of breath    Physical Exam:   GEN: NAD, alert and oriented, well appearing  SKIN: dry without significant lesions or rashes  HEENT: NCAT, PERRL, EOMs intact  NECK: No JVD appreciated  CARDIOVASCULAR:  Irregularly irregular, tachycardic, normal S1, S2 without murmurs, rubs, or gallops appreciated  LUNGS: Clear to auscultation bilaterally without wheezes, rhonchi, or rales  ABDOMEN: Soft, nontender, nondistended  EXTREMITIES/VASCULAR: perfused without clubbing, cyanosis, or edema b/l  PSYCH: Normal mood and affect  NEURO: CN ll-Xll grossly intact                Lab Results: I have personally reviewed pertinent lab results  Results from last 7 days  Lab Units 01/24/19  0500 01/23/19  0633 01/22/19  0638   WBC Thousand/uL 10 38* 10 96* 10 28*   HEMOGLOBIN g/dL 14 0 14 6 16 5   HEMATOCRIT % 43 9 45 4 50 5*   PLATELETS Thousands/uL 153 164 212       Results from last 7 days  Lab Units 01/24/19  0500 01/23/19  0633 01/22/19  0638   POTASSIUM mmol/L 3 2* 3 3* 3 4*   CHLORIDE mmol/L 106 107 105   CO2 mmol/L 27 27 25   BUN mg/dL 13 18 18   CREATININE mg/dL 0 70 0 75 0 91   CALCIUM mg/dL 8 1* 8 4 8 5       Results from last 7 days  Lab Units 01/22/19  0638   INR  1 01       Results from last 7 days  Lab Units 01/24/19  0500   MAGNESIUM mg/dL 2 4         Imaging: I have personally reviewed pertinent reports  No results found for this or any previous visit      EKG:  Atrial fibrillation with rapid ventricular response, QTC appears stable, however difficult to measure in AFib      VTE Pharmacologic Prophylaxis: Eliquis

## 2019-01-24 NOTE — SOCIAL WORK
Met with the patient to complete assessment and explain role of CM  The patient and his wife live in a bilevel style home with 5 steps between levels  Prior to admission the patient was independent with self care, ambulation and he works and drives  His only DME is his CPAP from [facility]  He has no history of MH or D&A treatment  PCP is Dr Isidro Beatty  Patient has prescription coverage and uses [address], [place]  The patient has no Advance Directive and his wife, Jasson Saavedra, [phone], is his primary contact  CM reviewed d/c planning process including the following: identifying help at home, patient preference for d/c planning needs, Discharge Lounge, [facility] Meds to Bed program, availability of treatment team to discuss questions or concerns patient and/or family may have regarding understanding medications and recognizing signs and symptoms once discharged  CM also encouraged patient to follow up with all recommended appointments after discharge  Patient advised of importance for patient and family to participate in managing patients medical well being  Patient/caregiver received discharge checklist  Content reviewed  Patient/caregiver encouraged to participate in discharge plan of care prior to discharge home

## 2019-01-24 NOTE — PLAN OF CARE
CARDIOVASCULAR - ADULT     Maintains optimal cardiac output and hemodynamic stability Progressing     Absence of cardiac dysrhythmias or at baseline rhythm Progressing        HEMATOLOGIC - ADULT     Maintains hematologic stability Progressing        INFECTION - ADULT     Absence or prevention of progression during hospitalization Progressing     Absence of fever/infection during neutropenic period Progressing        Knowledge Deficit     Patient/family/caregiver demonstrates understanding of disease process, treatment plan, medications, and discharge instructions Progressing        METABOLIC, FLUID AND ELECTROLYTES - ADULT     Electrolytes maintained within normal limits Progressing     Fluid balance maintained Progressing     Glucose maintained within target range Progressing        PAIN - ADULT     Verbalizes/displays adequate comfort level or baseline comfort level Progressing        RESPIRATORY - ADULT     Achieves optimal ventilation and oxygenation Progressing

## 2019-01-25 ENCOUNTER — ANESTHESIA EVENT (INPATIENT)
Dept: NON INVASIVE DIAGNOSTICS | Facility: HOSPITAL | Age: 54
DRG: 271 | End: 2019-01-25
Payer: COMMERCIAL

## 2019-01-25 VITALS
BODY MASS INDEX: 36.64 KG/M2 | SYSTOLIC BLOOD PRESSURE: 140 MMHG | TEMPERATURE: 98.3 F | DIASTOLIC BLOOD PRESSURE: 87 MMHG | OXYGEN SATURATION: 99 % | HEART RATE: 100 BPM | RESPIRATION RATE: 18 BRPM | HEIGHT: 67 IN | WEIGHT: 233.47 LBS

## 2019-01-25 LAB
ANION GAP SERPL CALCULATED.3IONS-SCNC: 6 MMOL/L (ref 4–13)
ATRIAL RATE: 100 BPM
ATRIAL RATE: 100 BPM
ATRIAL RATE: 101 BPM
ATRIAL RATE: 340 BPM
ATRIAL RATE: 98 BPM
BUN SERPL-MCNC: 12 MG/DL (ref 5–25)
CALCIUM SERPL-MCNC: 8.7 MG/DL (ref 8.3–10.1)
CHLORIDE SERPL-SCNC: 107 MMOL/L (ref 100–108)
CO2 SERPL-SCNC: 26 MMOL/L (ref 21–32)
CREAT SERPL-MCNC: 0.68 MG/DL (ref 0.6–1.3)
GFR SERPL CREATININE-BSD FRML MDRD: 111 ML/MIN/1.73SQ M
GLUCOSE SERPL-MCNC: 121 MG/DL (ref 65–140)
GLUCOSE SERPL-MCNC: 125 MG/DL (ref 65–140)
GLUCOSE SERPL-MCNC: 145 MG/DL (ref 65–140)
MAGNESIUM SERPL-MCNC: 2.4 MG/DL (ref 1.6–2.6)
P AXIS: 2 DEGREES
P AXIS: 4 DEGREES
P AXIS: 4 DEGREES
POTASSIUM SERPL-SCNC: 3.7 MMOL/L (ref 3.5–5.3)
PR INTERVAL: 190 MS
PR INTERVAL: 194 MS
PR INTERVAL: 200 MS
QRS AXIS: -40 DEGREES
QRS AXIS: -44 DEGREES
QRS AXIS: -45 DEGREES
QRS AXIS: -48 DEGREES
QRS AXIS: -52 DEGREES
QRSD INTERVAL: 118 MS
QRSD INTERVAL: 122 MS
QRSD INTERVAL: 122 MS
QT INTERVAL: 336 MS
QT INTERVAL: 382 MS
QT INTERVAL: 390 MS
QT INTERVAL: 398 MS
QT INTERVAL: 514 MS
QTC INTERVAL: 452 MS
QTC INTERVAL: 492 MS
QTC INTERVAL: 503 MS
QTC INTERVAL: 508 MS
QTC INTERVAL: 656 MS
SODIUM SERPL-SCNC: 139 MMOL/L (ref 136–145)
T WAVE AXIS: 17 DEGREES
T WAVE AXIS: 56 DEGREES
T WAVE AXIS: 67 DEGREES
T WAVE AXIS: 73 DEGREES
T WAVE AXIS: 78 DEGREES
VENTRICULAR RATE: 100 BPM
VENTRICULAR RATE: 100 BPM
VENTRICULAR RATE: 109 BPM
VENTRICULAR RATE: 98 BPM
VENTRICULAR RATE: 98 BPM

## 2019-01-25 PROCEDURE — 93325 DOPPLER ECHO COLOR FLOW MAPG: CPT | Performed by: INTERNAL MEDICINE

## 2019-01-25 PROCEDURE — 93321 DOPPLER ECHO F-UP/LMTD STD: CPT | Performed by: INTERNAL MEDICINE

## 2019-01-25 PROCEDURE — 93010 ELECTROCARDIOGRAM REPORT: CPT | Performed by: INTERNAL MEDICINE

## 2019-01-25 PROCEDURE — 83735 ASSAY OF MAGNESIUM: CPT | Performed by: PHYSICIAN ASSISTANT

## 2019-01-25 PROCEDURE — 80048 BASIC METABOLIC PNL TOTAL CA: CPT | Performed by: PHYSICIAN ASSISTANT

## 2019-01-25 PROCEDURE — 93005 ELECTROCARDIOGRAM TRACING: CPT

## 2019-01-25 PROCEDURE — 93308 TTE F-UP OR LMTD: CPT

## 2019-01-25 PROCEDURE — 99239 HOSP IP/OBS DSCHRG MGMT >30: CPT | Performed by: INTERNAL MEDICINE

## 2019-01-25 PROCEDURE — 5A2204Z RESTORATION OF CARDIAC RHYTHM, SINGLE: ICD-10-PCS | Performed by: INTERNAL MEDICINE

## 2019-01-25 PROCEDURE — 82948 REAGENT STRIP/BLOOD GLUCOSE: CPT

## 2019-01-25 PROCEDURE — 93312 ECHO TRANSESOPHAGEAL: CPT | Performed by: INTERNAL MEDICINE

## 2019-01-25 PROCEDURE — 93308 TTE F-UP OR LMTD: CPT | Performed by: INTERNAL MEDICINE

## 2019-01-25 PROCEDURE — 92960 CARDIOVERSION ELECTRIC EXT: CPT | Performed by: INTERNAL MEDICINE

## 2019-01-25 PROCEDURE — 92960 CARDIOVERSION ELECTRIC EXT: CPT

## 2019-01-25 RX ORDER — DOFETILIDE 0.5 MG/1
500 CAPSULE ORAL EVERY 12 HOURS SCHEDULED
Qty: 60 CAPSULE | Refills: 3 | Status: SHIPPED | OUTPATIENT
Start: 2019-01-25 | End: 2019-06-11 | Stop reason: SDUPTHER

## 2019-01-25 RX ORDER — PANTOPRAZOLE SODIUM 40 MG/1
40 TABLET, DELAYED RELEASE ORAL
Qty: 30 TABLET | Refills: 0 | Status: SHIPPED | OUTPATIENT
Start: 2019-01-26 | End: 2019-10-10 | Stop reason: ALTCHOICE

## 2019-01-25 RX ORDER — PROPOFOL 10 MG/ML
INJECTION, EMULSION INTRAVENOUS AS NEEDED
Status: DISCONTINUED | OUTPATIENT
Start: 2019-01-25 | End: 2019-01-25 | Stop reason: SURG

## 2019-01-25 RX ORDER — POTASSIUM CHLORIDE 20 MEQ/1
20 TABLET, EXTENDED RELEASE ORAL 2 TIMES DAILY
Qty: 30 TABLET | Refills: 3 | Status: SHIPPED | OUTPATIENT
Start: 2019-01-25 | End: 2019-03-05 | Stop reason: SDUPTHER

## 2019-01-25 RX ORDER — METOPROLOL SUCCINATE 100 MG/1
150 TABLET, EXTENDED RELEASE ORAL 2 TIMES DAILY
Qty: 90 TABLET | Refills: 3 | Status: SHIPPED | OUTPATIENT
Start: 2019-01-25 | End: 2019-05-15 | Stop reason: SDUPTHER

## 2019-01-25 RX ORDER — POTASSIUM CHLORIDE 20 MEQ/1
40 TABLET, EXTENDED RELEASE ORAL ONCE
Status: COMPLETED | OUTPATIENT
Start: 2019-01-25 | End: 2019-01-25

## 2019-01-25 RX ADMIN — SODIUM CHLORIDE: 0.9 INJECTION, SOLUTION INTRAVENOUS at 07:57

## 2019-01-25 RX ADMIN — PANTOPRAZOLE SODIUM 40 MG: 40 TABLET, DELAYED RELEASE ORAL at 06:31

## 2019-01-25 RX ADMIN — METOPROLOL SUCCINATE 150 MG: 100 TABLET, EXTENDED RELEASE ORAL at 09:10

## 2019-01-25 RX ADMIN — PROPOFOL 120 MG: 10 INJECTION, EMULSION INTRAVENOUS at 08:30

## 2019-01-25 RX ADMIN — DOFETILIDE 500 MCG: 0.5 CAPSULE ORAL at 09:10

## 2019-01-25 RX ADMIN — APIXABAN 5 MG: 5 TABLET, FILM COATED ORAL at 08:25

## 2019-01-25 RX ADMIN — ASPIRIN 81 MG 81 MG: 81 TABLET ORAL at 09:10

## 2019-01-25 RX ADMIN — POTASSIUM CHLORIDE 20 MEQ: 1500 TABLET, EXTENDED RELEASE ORAL at 09:10

## 2019-01-25 RX ADMIN — LISINOPRIL 40 MG: 20 TABLET ORAL at 09:10

## 2019-01-25 RX ADMIN — POTASSIUM CHLORIDE 40 MEQ: 1500 TABLET, EXTENDED RELEASE ORAL at 10:29

## 2019-01-25 NOTE — ANESTHESIA PREPROCEDURE EVALUATION
Review of Systems/Medical History  Patient summary reviewed  Chart reviewed  No history of anesthetic complications     Cardiovascular  EKG reviewed, Exercise tolerance (METS): >4,  Hyperlipidemia, Hypertension , CAD , CAD status: obstructive and 1VD, Cardiac stents drug eluting and > 1 year History of percutaneous transluminal coronary angioplasty, Dysrhythmias , atrial fibrillation, CHF , NYHA Classification: II compensated CHF,   Comment: HFrEF,  Pulmonary  Shortness of breath, Sleep apnea CPAP,        GI/Hepatic    GERD well controlled,             Endo/Other  Diabetes well controlled type 2 Oral agent,   Obesity (BMI 36)  morbid obesity   GYN       Hematology  Negative hematology ROS      Musculoskeletal  Negative musculoskeletal ROS        Neurology  Negative neurology ROS      Psychology   Negative psychology ROS              Physical Exam    Airway    Mallampati score: II  TM Distance: >3 FB  Neck ROM: full     Dental       Cardiovascular  Rhythm: irregular, Rate: abnormal,     Pulmonary  Breath sounds clear to auscultation,     Other Findings        Anesthesia Plan  ASA Score- 3     Anesthesia Type- IV sedation with anesthesia with ASA Monitors  Additional Monitors:   Airway Plan:         Plan Factors-    Induction- intravenous  Postoperative Plan-     Informed Consent- Anesthetic plan and risks discussed with patient  I personally reviewed this patient with the CRNA  Discussed and agreed on the Anesthesia Plan with the CRNA  Davon Mcallister

## 2019-01-25 NOTE — SOCIAL WORK
Cm received script from [facility] for [facility] to check cost  Cm verified with Clemencia August medication is in addition to the tikosyn  Meds sent to [facility] and cm received call from [employer]  [facility] co-pay is $50  Pt made aware and agreeable

## 2019-01-25 NOTE — SOCIAL WORK
Script for [employer] sent to [facility] to check cost  Received call from [facility] and co-pay is $25  Pt made aware and would like meds to bed

## 2019-01-25 NOTE — ANESTHESIA POSTPROCEDURE EVALUATION
Post-Op Assessment Note      CV Status:  Stable    Mental Status:  Alert and awake    Hydration Status:  Euvolemic    PONV Controlled:  Controlled    Airway Patency:  Patent    Post Op Vitals Reviewed: Yes          Staff: CRNA           /91 (01/25/19 0844)    Temp      Pulse 100 (01/25/19 0844)   Resp 15 (01/25/19 0844)    SpO2 97 % (01/25/19 0844)

## 2019-01-25 NOTE — PROGRESS NOTES
01/25/19 1100   Clinical Encounter Type   Visited With Patient and family together   Routine Visit Introduction   Patient Spiritual Encounters   Spiritual Assessment 4   Suffering Severity 4   Coping 5   Family Spiritual Encounters   Family Coping Accepting;Open/discussion   Family Normalization 5   Family Participation in Care 5   Family Support During Treatment 5   Caregiver-Patient Relationship 5

## 2019-01-25 NOTE — PROGRESS NOTES
Pt had an increased Bit=932 1/24 at 2100  Dr Vasquez made aware and order to do an EKG prior to this mornings dose  Qtc-[id number] this morning  Per Dr Vasquez, wait for EPS to report Qtc and determine need for Tikosyn dose

## 2019-01-25 NOTE — DISCHARGE INSTRUCTIONS
No heavy lifting or strenuous activity for one week  No soaking in a bath tub/hot tub/swimming pool for one week or until groin heals  If you notice ongoing bleeding, swelling, or firm lumps in groin near ablation incision, please contact Dr Maria Jorge office - [phone]      Please refer to this medication list and contact our office prior to beginning any new medications while on Tikosyn:

## 2019-01-25 NOTE — DISCHARGE SUMMARY
Discharge Summary - Ankush Jeter 48 y o  male MRN: [id number]    Unit/Bed#: [facility] 510-01 Encounter: [id number]      Admission Date: 1/22/2019   Discharge Date: 1/25/2019    Discharge Diagnosis:   1  Persistent atrial fibrillation status post cryoablation with pulmonary vein isolation and typical atrial flutter line 1/22/2018,post op recurrent atrial fibrillation status post cardioversion 1/25/2018              A ) CHADS2 Vasc = 4, on Eliquis              B ) rates currently in the high 90s to 100              C ) prior failed cardioversion 10/2018  2  Likely NICM with LVEF 35-40% per ELZBIETA 10/2018              A ) nonischemic stress test 9/2018  3  CAD status post prior PCI to LAD in 2015  4  Hypertension  5  Hyperlipidemia  6  Obesity   7  Diabetes mellitus type 2  8  Obstructive sleep apnea, on CPAP    Procedures Performed:   Orders Placed This Encounter   Procedures    Cardiac eps/afib ablation    Electrical Cardioversion   1  Transesophageal echocardiogram which showed no evidence of left atrial or appendage thrombus  2  Cryoablation of atrial fibrillation with pulmonary vein isolation  3  Typical cava tricuspid isthmus dependent atrial flutter ablation  4  Cardioversion     Consultants: none    HPI: Please refer to the initial history and physical as well as procedure notes for full details  Briefly, Kandy Vasquez is a 48y o  year old male with a history of CAD status post prior PCI to LAD, hypertension, hyperlipidemia, DM2, NORA on CPAP, and obesity  He typically follows with Dr Stuart Louis  He was incidentally found to be in persistent atrial fibrillation 9/2018  He subsequently underwent ELZBIETA/CV 10/2018, however he unfortunately reverted back to atrial fibrillation  Originally, admission for Sotalol was discussed, however prior to this he was seen in consultation by EP on 12/13/2018  Given his reduced LVEF, an ablation was recommended to help restore normal sinus rhythm   He presented to undergo that procedure, and he was then planned to be started on antiarrhythmic therapy in the post ablation period  Hospital Course: More Jeter presented at his baseline state of health  After the procedure was explained in detail and consent was obtained, he underwent the above-listed procedures without complications  Please refer to procedure notes by Dr Clarisa Walls for full details  He tolerated the procedure well  He was then monitored overnight for further observation  That evening, he was started on Tikosyn antiarrhythmic therapy, and close telemetry and EKG monitoring was performed  There were no acute issues or events overnight  The following morning he denied all cardiac complaints, including chest pain/pressure, dyspnea, palpitations, dizziness, lightheadedness, or syncope  His vital signs were reviewed and labs are stable  Telemetry however showed sinus tachycardia  His groins were soft without significant hematoma or recurrent bleeding  Given his sinus tachycardia, his Coreg was initially up titrated  He subsequently then reverted back to atrial fibrillation with periods of rapid ventricular response  He was changed to Toprol- mg twice daily, and he then underwent a cardioversion 1/25/2019  Following the cardioversion he was in sinus rhythm, however he was still tachycardic with rates in the high 90s-100s  Thus, Toprol-XL was increased to 150 mg twice daily  He underwent a limited echocardiogram which ruled out an effusion  It was thought his heart failure may be driving his sinus tachycardia  Dr Clarisa Walls recommended that he try Corlanor 5 mg twice daily, which will be ordered upon discharge  He underwent the first 5 doses of AAD without complications  Serial EKGs were performed 2 hours after each dose, and there were no significant changes in QT/QTc with intiating doses  There were no significant occurrence of ventricular ectopy on telemetry    Electrolytes and renal function were monitored throughout his stay  His potassium tends to run low, and was repleted throughout his stay  At time of discharge, He was ambulating the cardiac unit without complaints of llightheadedness, presyncope, syncope, chest pain, shortness of breath, orthopnea, PND, palpitations, or bleeding problems  He received education regarding AAD therapy, avoiding missed doses, pharmacy moitoring for drug to drug interactions, and concerning signs and symptoms that would prompt urgent evaluation  He was given routine post ablation discharge instructions and restrictions, and these were explained in detail  He was given a follow up appointment with Trudy Mena PA-C as well as a 1 week follow-up ECG for QTC monitoring  In terms of his medications, his beta-blocker has been changed as noted above, and Corlanor will be added  A script was given to case management to try to price this prior to discharge  He will need to continue his anticoagulation in the post ablation setting, and he will be placed on pantoprazole 40 mg daily for 1 month following his procedure  He will also be sent home on potassium supplementation, as he ran low and required replacement throughout his stay       Physical exam at the time of discharge:  GEN: NAD, alert and oriented, well appearing  SKIN: dry without significant lesions or rashes  HEENT: NCAT, PERRL, EOMs intact  NECK: No JVD appreciated  CARDIOVASCULAR: RRR, slightly tachycardic, normal S1, S2 without murmurs, rubs, or gallops appreciated  LUNGS: Clear to auscultation bilaterally without wheezes, rhonchi, or rales  ABDOMEN: Soft, nontender, nondistended  EXTREMITIES/VASCULAR: perfused without clubbing, cyanosis, or edema b/l  PSYCH: Normal mood and affect  NEURO: CN ll-Xll grossly intact      At time of discharge, he was ambulating the cardiac unit without complaints of llightheadedness, presyncope, syncope, chest pain, shortness of breath, orthopnea, PND, palpitations, or bleeding problems  He received education regarding AAD therapy, avoiding missed doses, pharmacy moitoring for drug to drug interactions, and concerning signs and symptoms that would prompt urgent evaluation  He is stable for discharge at this time with all questions answered  He was discussed in detail with Dr Jovon Wynne who is in agreement with this discharge summary  Discharge Medications:  See after visit summary for reconciled discharge medications provided to patient and family      Prescriptions Prior to Admission   Medication    amLODIPine (NORVASC) 2 5 mg tablet    apixaban (ELIQUIS) 5 mg    aspirin 81 mg chewable tablet    benazepril (LOTENSIN) 40 MG tablet    carvedilol (COREG) 12 5 mg tablet    cholecalciferol (VITAMIN D3) 1,000 units tablet    co-enzyme Q-10 30 MG capsule    ONETOUCH VERIO test strip    OZEMPIC 1 MG/DOSE SOPN    rosuvastatin (CRESTOR) 20 MG tablet    SYNJARDY XR 12 5-1000 MG TB24         [facility]inet Labs/diagnostics:  CBC with diff:   Results from last 7 days  Lab Units 01/24/19  0500 01/23/19  0633 01/22/19  0638   WBC Thousand/uL 10 38* 10 96* 10 28*   HEMOGLOBIN g/dL 14 0 14 6 16 5   HEMATOCRIT % 43 9 45 4 50 5*   MCV fL 86 85 84   PLATELETS Thousands/uL 153 164 212   MCH pg 27 3 27 3 27 4   MCHC g/dL 31 9 32 2 32 7   RDW % 13 2 13 2 13 1   MPV fL 11 8 11 2 11 1   NRBC AUTO /100 WBCs 0  --  0       BMP:  Results from last 7 days  Lab Units 01/25/19  0532 01/24/19  0500 01/23/19  0633 01/22/19  0638   POTASSIUM mmol/L 3 7 3 2* 3 3* 3 4*   CHLORIDE mmol/L 107 106 107 105   CO2 mmol/L 26 27 27 25   BUN mg/dL 12 13 18 18   CREATININE mg/dL 0 68 0 70 0 75 0 91   CALCIUM mg/dL 8 7 8 1* 8 4 8 5       Magnesium:   Results from last 7 days  Lab Units 01/25/19  0532 01/24/19  0500   MAGNESIUM mg/dL 2 4 2 4       Coags:   Results from last 7 days  Lab Units 01/22/19  0638   INR  1 61         Complications: none    Condition at Discharge: good     Discharge instructions/Information to patient and family:   See after visit summary for information provided to patient and family  Provisions for Follow-Up Care:  See after visit summary for information related to follow-up care and any pertinent home health orders  Disposition: Home    Planned Readmission: No    Discharge Statement   I spent 45 minutes minutes discharging the patient  This time was spent on the day of discharge  I had direct contact with the patient on the day of discharge  Additional documentation is required if more than 30 minutes were spent on discharge   Evaluating the incision, discussing discharge instructions and restrictions, arranging follow up appointments, discussing medications

## 2019-01-25 NOTE — PROCEDURES
Cardioversion    : Wiliam  Indication: Afib  Sedation Propofol  AC: Eliquis, dose given    Complications: None    Pads AP, 200 J DCCV on SYNC  Successful conversion to NSR

## 2019-01-25 NOTE — PROGRESS NOTES
01/25/19 [address]   Spiritual Beliefs/Perceptions   Support Systems Spouse/significant other   Stress Factors   Patient Stress Factors Health changes   Family Stress Factors None identified   Coping Responses   Patient Coping Accepting;Open/discussion   Family Coping Accepting;Open/discussion   Plan of Care   Comments Cultivated a relationship of care and support  Expressed gratitude  Expressed intermediate hope  Identified meaningful connections  Provided chaplaincy education      Assessment Completed by: Unit visit

## 2019-01-28 ENCOUNTER — TRANSITIONAL CARE MANAGEMENT (OUTPATIENT)
Dept: FAMILY MEDICINE CLINIC | Facility: CLINIC | Age: 54
End: 2019-01-28

## 2019-02-01 ENCOUNTER — TELEPHONE (OUTPATIENT)
Dept: NON INVASIVE DIAGNOSTICS | Facility: HOSPITAL | Age: 54
End: 2019-02-01

## 2019-02-01 ENCOUNTER — HOSPITAL ENCOUNTER (OUTPATIENT)
Dept: NON INVASIVE DIAGNOSTICS | Facility: CLINIC | Age: 54
Discharge: HOME | End: 2019-02-01
Payer: COMMERCIAL

## 2019-02-01 ENCOUNTER — CLINICAL SUPPORT (OUTPATIENT)
Dept: CARDIOLOGY CLINIC | Facility: CLINIC | Age: 54
End: 2019-02-01
Payer: COMMERCIAL

## 2019-02-01 VITALS
RESPIRATION RATE: 12 BRPM | HEIGHT: 67 IN | BODY MASS INDEX: 36.02 KG/M2 | HEART RATE: 100 BPM | DIASTOLIC BLOOD PRESSURE: 90 MMHG | SYSTOLIC BLOOD PRESSURE: 124 MMHG | WEIGHT: 229.5 LBS

## 2019-02-01 DIAGNOSIS — T14.8XXA HEMATOMA: ICD-10-CM

## 2019-02-01 DIAGNOSIS — I48.0 PAROXYSMAL A-FIB (HCC): ICD-10-CM

## 2019-02-01 DIAGNOSIS — I48.19 PERSISTENT ATRIAL FIBRILLATION (HCC): Primary | ICD-10-CM

## 2019-02-01 DIAGNOSIS — T14.8XXA HEMATOMA: Primary | ICD-10-CM

## 2019-02-01 PROCEDURE — 93926 LOWER EXTREMITY STUDY: CPT | Performed by: SURGERY

## 2019-02-01 PROCEDURE — 93926 LOWER EXTREMITY STUDY: CPT

## 2019-02-01 PROCEDURE — 93000 ELECTROCARDIOGRAM COMPLETE: CPT | Performed by: PHYSICIAN ASSISTANT

## 2019-02-01 RX ORDER — LANCETS 33 GAUGE
EACH MISCELLANEOUS
Refills: 1 | COMMUNITY
Start: 2019-01-06

## 2019-02-01 RX ORDER — CEPHALEXIN 500 MG/1
500 CAPSULE ORAL EVERY 8 HOURS SCHEDULED
Qty: 30 CAPSULE | Refills: 0 | Status: SHIPPED | OUTPATIENT
Start: 2019-02-01 | End: 2019-02-11

## 2019-02-01 RX ORDER — DOXYCYCLINE 100 MG/1
100 TABLET ORAL 2 TIMES DAILY
Qty: 20 TABLET | Refills: 0 | Status: SHIPPED | OUTPATIENT
Start: 2019-02-01 | End: 2019-02-11

## 2019-02-01 NOTE — TELEPHONE ENCOUNTER
Received notification from [facility] regarding patient's CV procedure  Requested availability for Monday  Will schedule for 2/4/ 0900  Verified with CATH lab that short stay will call patient to provide directions/instructions

## 2019-02-01 NOTE — PROGRESS NOTES
Patient returns for 1 week ECG post hospitalization for atrial fibrillation ablation and tikosyn loading  Is having small oozing and discomfort from right groin access site  No fevers, chills or night sweats  On exam there is a mild amount of pus coming right non healed incision from right groin site w/ small solid mass  Will send for US to evaluate for hematoma (which this is likely what the mass is) vs abscess  Will prescribe antibiotics for infection  In addition he has returned to rate controlled a fib  Will send for cardioversion on Monday and discuss AAD therapy going forward with Dr Dhiraj Barth

## 2019-02-03 ENCOUNTER — TELEPHONE (OUTPATIENT)
Dept: INPATIENT UNIT | Facility: HOSPITAL | Age: 54
End: 2019-02-03

## 2019-02-04 ENCOUNTER — ANESTHESIA (OUTPATIENT)
Dept: SURGERY | Facility: HOSPITAL | Age: 54
End: 2019-02-04
Payer: COMMERCIAL

## 2019-02-04 ENCOUNTER — ANESTHESIA EVENT (OUTPATIENT)
Dept: SURGERY | Facility: HOSPITAL | Age: 54
End: 2019-02-04
Payer: COMMERCIAL

## 2019-02-04 ENCOUNTER — HOSPITAL ENCOUNTER (OUTPATIENT)
Dept: NON INVASIVE DIAGNOSTICS | Facility: HOSPITAL | Age: 54
Discharge: HOME | End: 2019-02-04
Attending: INTERNAL MEDICINE | Admitting: INTERNAL MEDICINE
Payer: COMMERCIAL

## 2019-02-04 VITALS
HEIGHT: 68 IN | WEIGHT: 229 LBS | HEART RATE: 87 BPM | SYSTOLIC BLOOD PRESSURE: 133 MMHG | RESPIRATION RATE: 18 BRPM | DIASTOLIC BLOOD PRESSURE: 88 MMHG | TEMPERATURE: 97.6 F | OXYGEN SATURATION: 95 % | BODY MASS INDEX: 34.71 KG/M2

## 2019-02-04 DIAGNOSIS — I48.0 PAROXYSMAL A-FIB (HCC): ICD-10-CM

## 2019-02-04 LAB
ANION GAP SERPL CALCULATED.3IONS-SCNC: 5 MMOL/L (ref 4–13)
ATRIAL RATE: 340 BPM
ATRIAL RATE: 86 BPM
BUN SERPL-MCNC: 18 MG/DL (ref 5–25)
CALCIUM SERPL-MCNC: 8.7 MG/DL (ref 8.3–10.1)
CHLORIDE SERPL-SCNC: 107 MMOL/L (ref 100–108)
CO2 SERPL-SCNC: 27 MMOL/L (ref 21–32)
CREAT SERPL-MCNC: 0.88 MG/DL (ref 0.6–1.3)
GFR SERPL CREATININE-BSD FRML MDRD: 99 ML/MIN/1.73SQ M
GLUCOSE P FAST SERPL-MCNC: 107 MG/DL (ref 65–99)
GLUCOSE SERPL-MCNC: 107 MG/DL (ref 65–140)
MAGNESIUM SERPL-MCNC: 1.8 MG/DL (ref 1.6–2.6)
P AXIS: 3 DEGREES
POTASSIUM SERPL-SCNC: 3.9 MMOL/L (ref 3.5–5.3)
PR INTERVAL: 204 MS
QRS AXIS: -45 DEGREES
QRS AXIS: -49 DEGREES
QRSD INTERVAL: 118 MS
QRSD INTERVAL: 122 MS
QT INTERVAL: 384 MS
QT INTERVAL: 414 MS
QTC INTERVAL: 456 MS
QTC INTERVAL: 495 MS
SODIUM SERPL-SCNC: 139 MMOL/L (ref 136–145)
T WAVE AXIS: 32 DEGREES
T WAVE AXIS: 6 DEGREES
VENTRICULAR RATE: 85 BPM
VENTRICULAR RATE: 86 BPM

## 2019-02-04 PROCEDURE — 92960 CARDIOVERSION ELECTRIC EXT: CPT

## 2019-02-04 PROCEDURE — 93005 ELECTROCARDIOGRAM TRACING: CPT

## 2019-02-04 PROCEDURE — 83735 ASSAY OF MAGNESIUM: CPT | Performed by: INTERNAL MEDICINE

## 2019-02-04 PROCEDURE — 93010 ELECTROCARDIOGRAM REPORT: CPT | Performed by: INTERNAL MEDICINE

## 2019-02-04 PROCEDURE — 80048 BASIC METABOLIC PNL TOTAL CA: CPT | Performed by: INTERNAL MEDICINE

## 2019-02-04 RX ORDER — SODIUM CHLORIDE 9 MG/ML
50 INJECTION, SOLUTION INTRAVENOUS CONTINUOUS
Status: DISCONTINUED | OUTPATIENT
Start: 2019-02-04 | End: 2019-02-04 | Stop reason: HOSPADM

## 2019-02-04 RX ORDER — PROPOFOL 10 MG/ML
INJECTION, EMULSION INTRAVENOUS AS NEEDED
Status: DISCONTINUED | OUTPATIENT
Start: 2019-02-04 | End: 2019-02-04 | Stop reason: SURG

## 2019-02-04 RX ADMIN — PROPOFOL 50 MG: 10 INJECTION, EMULSION INTRAVENOUS at 09:09

## 2019-02-04 RX ADMIN — PROPOFOL 50 MG: 10 INJECTION, EMULSION INTRAVENOUS at 09:10

## 2019-02-04 RX ADMIN — PROPOFOL 50 MG: 10 INJECTION, EMULSION INTRAVENOUS at 09:08

## 2019-02-04 RX ADMIN — SODIUM CHLORIDE 50 ML/HR: 0.9 INJECTION, SOLUTION INTRAVENOUS at 08:20

## 2019-02-04 NOTE — NURSING NOTE
Pt transported to short stay via stretcher  Report called to floor  Post procedure 12 lead EKG performed

## 2019-02-04 NOTE — ANESTHESIA POSTPROCEDURE EVALUATION
Post-Op Assessment Note      CV Status:  Stable    Mental Status:  Alert and awake    Hydration Status:  Euvolemic    PONV Controlled:  Controlled    Airway Patency:  Patent    Post Op Vitals Reviewed: Yes          Staff: CRNA           /81 (02/04/19 0927)    Temp      Pulse 87 (02/04/19 0927)   Resp 16 (02/04/19 0927)    SpO2 97 % (02/04/19 0927)

## 2019-02-04 NOTE — PROCEDURES
Cardiology fellow: Isma Jurado  Attending cardiologist: Mary Shukla    Indication: Atrial fibrillation with left ventricular systolic dysfunction    Anti-coagulation: Apixaban    Anesthesia: Conscious sedation provided by anesthesiology  Propofol IV per anesthesia  No ELZBIETA was necessary as patient had ELZBIETA-cardioversion about 1 week (no left atrial thrombus), and has been anticoagulated since then    Electrical Pad Placement: Anterolateral - Right upper sternum and left lateral    Procedure: Shock #1 - 175 J synchronized shock --> successful cardioversion    Complications: None  Sinus rhythm documented by 12 lead ECG  Disposition: Echo lab recovery area, followed by discharge home when criteria met    Outpatient follow up with Dr Ramon on 2/7/19

## 2019-02-04 NOTE — ANESTHESIA PREPROCEDURE EVALUATION
Review of Systems/Medical History  Patient summary reviewed  Chart reviewed  No history of anesthetic complications     Cardiovascular  EKG reviewed, Exercise tolerance (METS): >4,  Hyperlipidemia, Hypertension , CAD , CAD status: obstructive and 1VD, Cardiac stents drug eluting and > 1 year History of percutaneous transluminal coronary angioplasty, Dysrhythmias , atrial fibrillation, CHF , NYHA Classification: II compensated CHF,   Comment: HFrEF,  Pulmonary  Shortness of breath, Sleep apnea CPAP,        GI/Hepatic    GERD well controlled,             Endo/Other  Diabetes well controlled type 2 Oral agent,   Obesity (BMI 36)  morbid obesity   GYN       Hematology  Negative hematology ROS      Musculoskeletal  Negative musculoskeletal ROS        Neurology  Negative neurology ROS      Psychology   Negative psychology ROS              Physical Exam    Airway    Mallampati score: II  TM Distance: >3 FB  Neck ROM: full     Dental       Cardiovascular  Rhythm: irregular, Rate: abnormal,     Pulmonary  Breath sounds clear to auscultation,     Other Findings        Anesthesia Plan  ASA Score- 3     Anesthesia Type- IV sedation with anesthesia with ASA Monitors  Additional Monitors:   Airway Plan:     Comment: IV sedation,  standard ASA monitors  Risks and benefits discussed with patient; patient consented and agrees to proceed  I saw and evaluated the patient  If seen with CRNA, we have discussed the anesthetic plan and I am in agreement that the plan is appropriate for the patient        Plan Factors-    Induction- intravenous  Postoperative Plan-     Informed Consent- Anesthetic plan and risks discussed with patient  I personally reviewed this patient with the CRNA  Discussed and agreed on the Anesthesia Plan with the CRNA  Georges Benavides

## 2019-02-07 ENCOUNTER — OFFICE VISIT (OUTPATIENT)
Dept: CARDIOLOGY CLINIC | Facility: CLINIC | Age: 54
End: 2019-02-07
Payer: COMMERCIAL

## 2019-02-07 VITALS
HEIGHT: 67 IN | WEIGHT: 229 LBS | HEART RATE: 86 BPM | SYSTOLIC BLOOD PRESSURE: 134 MMHG | BODY MASS INDEX: 35.94 KG/M2 | DIASTOLIC BLOOD PRESSURE: 88 MMHG

## 2019-02-07 DIAGNOSIS — I48.19 PERSISTENT ATRIAL FIBRILLATION (HCC): Primary | ICD-10-CM

## 2019-02-07 DIAGNOSIS — R00.0 INAPPROPRIATE SINUS TACHYCARDIA: ICD-10-CM

## 2019-02-07 DIAGNOSIS — I25.119 CORONARY ARTERY DISEASE INVOLVING NATIVE CORONARY ARTERY OF NATIVE HEART WITH ANGINA PECTORIS (HCC): ICD-10-CM

## 2019-02-07 DIAGNOSIS — I10 ESSENTIAL HYPERTENSION: ICD-10-CM

## 2019-02-07 PROCEDURE — 93000 ELECTROCARDIOGRAM COMPLETE: CPT | Performed by: PHYSICIAN ASSISTANT

## 2019-02-07 PROCEDURE — 99214 OFFICE O/P EST MOD 30 MIN: CPT | Performed by: PHYSICIAN ASSISTANT

## 2019-02-07 NOTE — PROGRESS NOTES
Cardiology Follow Up    Narendra Mckay [facility] [place]  1965  [id number]  HEART & VASCULAR [facility] [place]  [address]         Assessment/Plan     1  Persistent atrial fibrillation ([facility] [address] )     2  Coronary artery disease involving native coronary artery of native heart with angina pectoris ([facility] [address] )     3  Essential hypertension         ASSESSMENT:  1  Persistent atrial fibrillation   - AC with Eliquis   - s/p cryoablation with pulmonary vein isolation and typical flutter line 1/22/19    - post op recurrent atrial fibrillation, s/p DCCV 1/25/19, then underwent cardioversion again on 02/04/2019   - being maintained on tikosyn 500mcg BID, Toprol XL 150mg BID, and Corlanor 5mg BID   2  Suspected nonischemic cardiomyopathy    - EF of 30% per echo 1/25/19  3  History of CAD   - PCI to LAD in 2015  4  Essential hypertension  5  Hyperlipidemia  6  NORA   - compliant with CPAP    DISCUSSION/SUMMARY:  Patient is doing well  He is currently in sinus rhythm which he is delighted by  However, we did caution him that there still is a possibility that he may go back into atrial fibrillation  If he does, we can revisit the recommendation of a 2nd ablation at that point  Patient was counseled to call if he feels as though he has gone back into AFib  Otherwise, he is to continue his antibiotics until they are complete and follow-up with Dr Irina Bernard in 2 months  His Corlanor was also increased to 10 mg 2 times daily  History of Present Illness     HPI/INTERVAL HISTORY: Trinh Toribio is a 48 y o  male with a history of persistent atrial fibrillation, suspected nonischemic cardiomyopathy with EF of 30%, history of CAD with prior PCI to LAD 2015, essential hypertension, hyperlipidemia, and obstructive sleep apnea with compliance with CPAP  He is seen by Dr Irina Bernard as outpatient    He presented to [place] [address] on 01/22/2019 and underwent his 1st AFib ablation - cryo PVI with typical flutter line also by Dr Selma Enriquez  Post ablation he did revert back to atrial fibrillation  Therefore Tikosyn was started and he was monitored for the 1st 5 doses  However he still did not convert on his own and therefore underwent cardioversion 01/25/2019  At his 1 week EKG appointment, he was found to once again have converted back into atrial fibrillation  Therefore he underwent DCCV on 02/04/2019  Fortunately, today for his office visit he was found to be in sinus rhythm  This patient does feel when he has atrial fibrillation and he denies having felt that within the past couple weeks since the procedure  He does state that he feels at times as though someone is "poking his heart"  However, he denies outright chest pain, shortness of breath, lightheadedness, or dizziness  He also denies pain that is positional or with deep inspiration  Also at his 1 week EKG appointment, patient reported some oozing from the right groin site  Ultrasound was ordered to rule out abscess and he was also placed on antibiotics  He has almost completed the entire course and states that he has not seen any more oozing from that site  Patient is a  at [employer]  He states that he is ready to go back to work on Monday so long as his rhythm stays in check  Review of Systems  Negative for cardiovascular symptoms such as chest pain shortness of breath, palpitations, edema  Negative for neurological symptoms such as headache, diplopia, dizziness, syncope  ROS as noted above, otherwise 12 point review of systems was performed and is negative  Historical Information   Social History     Social History    Marital status: /Civil Union     Spouse name: N/A    Number of children: N/A    Years of education: N/A     Occupational History    Not on file       Social History Main Topics    Smoking status: Never Smoker    Smokeless tobacco: Former User     Quit date: 9/7/2014  Alcohol use No    Drug use: No    Sexual activity: Not on file     Other Topics Concern    Not on file     Social History Narrative    No narrative on file     Past Medical History:   Diagnosis Date    CAD (coronary artery disease)     Diabetes ([address] )     Dizziness     Erectile dysfunction     GERD (gastroesophageal reflux disease)     Hyperlipidemia     Hypertension     Shortness of breath      Past Surgical History:   Procedure Laterality Date    CARDIAC CATHETERIZATION      ELECTRICAL CARDIOVERSION  2/4/2019          History   Alcohol Use No     History   Drug Use No     History   Smoking Status    Never Smoker   Smokeless Tobacco    Former User    Quit date: 9/7/2014     Family History   Problem Relation Age of Onset    Coronary artery disease Family     Diabetes Family     No Known Problems Father        Meds/Allergies   Current Outpatient Prescriptions:     apixaban (ELIQUIS) 5 mg, Take 1 tablet (5 mg total) by mouth 2 (two) times a day, Disp: 180 tablet, Rfl: 3    aspirin 81 mg chewable tablet, Chew 1 tablet (81 mg total) daily, Disp: 100 tablet, Rfl: 1    benazepril (LOTENSIN) 40 MG tablet, Take 1 tablet (40 mg total) by mouth daily, Disp: 90 tablet, Rfl: 3    cephalexin (KEFLEX) 500 mg capsule, Take 1 capsule (500 mg total) by mouth every 8 (eight) hours for 10 days, Disp: 30 capsule, Rfl: 0    cholecalciferol (VITAMIN D3) 1,000 units tablet, Take by mouth daily, Disp: , Rfl:     co-enzyme Q-10 30 MG capsule, Take 30 mg by mouth daily  , Disp: , Rfl:     dofetilide (TIKOSYN) 500 mcg capsule, Take 1 capsule (500 mcg total) by mouth every 12 (twelve) hours, Disp: 60 capsule, Rfl: 3    doxycycline (ADOXA) 100 MG tablet, Take 1 tablet (100 mg total) by mouth 2 (two) times a day for 10 days, Disp: 20 tablet, Rfl: 0    ivabradine HCl (CORLANOR) 5 MG tablet, Take 1 tablet (5 mg total) by mouth 2 (two) times a day, Disp: 60 tablet, Rfl: 11    metoprolol succinate (TOPROL-XL) 100 mg 24 hr tablet, Take 1 5 tablets (150 mg total) by mouth 2 (two) times a day, Disp: 90 tablet, Rfl: 3    ONETOUCH DELICA LANCETS 21D MISC, CHECK BID, Disp: , Rfl: 1    ONETOUCH VERIO test strip, CHECK TWICE DAILY, Disp: , Rfl: 3    OZEMPIC 1 MG/DOSE SOPN, INJECT 1 MG SQ Q WEEK, Disp: , Rfl: 4    pantoprazole (PROTONIX) 40 mg tablet, Take 1 tablet (40 mg total) by mouth daily in the early morning, Disp: 30 tablet, Rfl: 0    potassium chloride (K-DUR,KLOR-CON) 20 mEq tablet, Take 1 tablet (20 mEq total) by mouth 2 (two) times a day, Disp: 30 tablet, Rfl: 3    rosuvastatin (CRESTOR) 20 MG tablet, Take 20 mg by mouth, Disp: , Rfl:     SYNJARDY XR 12 5-1000 MG TB24, TK 1 T PO BID, Disp: , Rfl: 2    Allergies   Allergen Reactions    Dye [Iodinated Diagnostic Agents] Hives and GI Intolerance     Definity - name of contrast         Objective   Vitals: Height:  5 ft 7, weight:  229 lb, blood pressure:  134/88, pulse:  86 beats per min      Physical Exam  GEN: NAD, alert and oriented, well appearing  SKIN: dry without significant lesions or rashes  HEENT: NCAT, PERRL, EOMs intact  NECK: No JVD appreciated  CARDIOVASCULAR: RRR, normal S1, S2 without murmurs, rubs, or gallops appreciated  LUNGS: Clear to auscultation bilaterally without wheezes, rhonchi, or rales  ABDOMEN: Soft, nontender, nondistended  EXTREMITIES/VASCULAR: perfused without clubbing, cyanosis, or edema b/l  PSYCH: Normal mood and affect  NEURO: CN ll-Xll grossly intact    ECHO (1/25/19):  LEFT VENTRICLE:  Systolic function was severely reduced   Ejection fraction was estimated to be 30 %      PERICARDIUM:  There was no pericardial effusion      HISTORY: PRIOR HISTORY: Coronary artery disease, Diabetes, Hypertension, Hyperlipidemia, Obstructive sleep apnea         EKG:  Normal sinus rhythm at 87 beats per minute

## 2019-02-08 ENCOUNTER — OFFICE VISIT (OUTPATIENT)
Dept: FAMILY MEDICINE CLINIC | Facility: CLINIC | Age: 54
End: 2019-02-08
Payer: COMMERCIAL

## 2019-02-08 VITALS
SYSTOLIC BLOOD PRESSURE: 142 MMHG | HEART RATE: 84 BPM | TEMPERATURE: 97.8 F | WEIGHT: 229 LBS | OXYGEN SATURATION: 98 % | HEIGHT: 67 IN | BODY MASS INDEX: 35.94 KG/M2 | DIASTOLIC BLOOD PRESSURE: 76 MMHG | RESPIRATION RATE: 16 BRPM

## 2019-02-08 DIAGNOSIS — E78.2 MIXED HYPERLIPIDEMIA: ICD-10-CM

## 2019-02-08 DIAGNOSIS — I25.119 CORONARY ARTERY DISEASE INVOLVING NATIVE CORONARY ARTERY OF NATIVE HEART WITH ANGINA PECTORIS (HCC): ICD-10-CM

## 2019-02-08 DIAGNOSIS — E66.8 MODERATE OBESITY: ICD-10-CM

## 2019-02-08 DIAGNOSIS — I48.19 PERSISTENT ATRIAL FIBRILLATION (HCC): Primary | ICD-10-CM

## 2019-02-08 DIAGNOSIS — I10 ESSENTIAL HYPERTENSION: ICD-10-CM

## 2019-02-08 PROCEDURE — 1111F DSCHRG MED/CURRENT MED MERGE: CPT | Performed by: FAMILY MEDICINE

## 2019-02-08 PROCEDURE — 99495 TRANSJ CARE MGMT MOD F2F 14D: CPT | Performed by: FAMILY MEDICINE

## 2019-02-08 NOTE — ASSESSMENT & PLAN NOTE
Patient has lost weight  Commended on this  Follow up with pulmonologist as scheduled  Remains on CPAP

## 2019-02-08 NOTE — ASSESSMENT & PLAN NOTE
Hypertension remains well controlled  Patient is presently on benazepril, metoprolol  Continue same

## 2019-02-08 NOTE — PROGRESS NOTES
Subjective:      Patient ID: Kristi Ricketts is a 48 y o  male  TCM Call (since 1/8/2019)     Date and time call was made  1/28/2019  9:00 AM    Hospital care reviewed  Records reviewed    Patient was hospitialized at  [facility]    Date of Admission  01/22/19    Date of discharge  01/25/19    Diagnosis  PERSISTENT ATRIAL FIBRILLATION    Disposition  Home    Were the patients medications reviewed and updated  Yes    Current Symptoms  None      TCM Call (since 1/8/2019)     Post hospital issues  None    Should patient be enrolled in anticoag monitoring? No    Scheduled for follow up? Yes    Patients specialists  Cardiologist    Cardiologist name  DR LONDONO     Did you obtain your prescribed medications  Yes    Do you need help managing your prescriptions or medications  No    Is transportation to your appointment needed  No    I have advised the patient to call PCP with any new or worsening symptoms    EFRA HENNESSY MA    Living Arrangements  Spouse or Significiant other    Support System  Family    Do you have social support  Yes, as much as I need    Are you recieving any outpatient services  Yes    What type of services  PATIENT WILL BE FOLLOWING UP OUT PATIENT   RADIOLOGY/ ONCOLOGY    Are you recieving home care services  No    Are you using any community resources  No    Current waiver services  No    Have you fallen in the last 12 months  No    Interperter language line needed  No      Patient presents for TCM visit following hospitalization for persistent atrial fibrillation  Below is the discharge summary as per Cliff Zeng PA-C:    Hospital Course: Belinda Jeter presented at his baseline state of health  After the procedure was explained in detail and consent was obtained, he underwent the above-listed procedures without complications  Please refer to procedure notes by Dr Luis Fernandez for full details  He tolerated the procedure well   He was then monitored overnight for further observation  That evening, he was started on Tikosyn antiarrhythmic therapy, and close telemetry and EKG monitoring was performed      There were no acute issues or events overnight  The following morning he denied all cardiac complaints, including chest pain/pressure, dyspnea, palpitations, dizziness, lightheadedness, or syncope  His vital signs were reviewed and labs are stable  Telemetry however showed sinus tachycardia  His groins were soft without significant hematoma or recurrent bleeding       Given his sinus tachycardia, his Coreg was initially up titrated  He subsequently then reverted back to atrial fibrillation with periods of rapid ventricular response  He was changed to Toprol- mg twice daily, and he then underwent a cardioversion 1/25/2019  Following the cardioversion he was in sinus rhythm, however he was still tachycardic with rates in the high 90s-100s  Thus, Toprol-XL was increased to 150 mg twice daily  He underwent a limited echocardiogram which ruled out an effusion  It was thought his heart failure may be driving his sinus tachycardia  Dr Henri Simpson recommended that he try Corlanor 5 mg twice daily, which will be ordered upon discharge      He underwent the first 5 doses of AAD without complications  Serial EKGs were performed 2 hours after each dose, and there were no significant changes in QT/QTc with intiating doses   There were no significant occurrence of ventricular ectopy on telemetry   Electrolytes and renal function were monitored throughout his stay  His potassium tends to run low, and was repleted throughout his stay       At time of discharge, He was ambulating the cardiac unit without complaints of llightheadedness, presyncope, syncope, chest pain, shortness of breath, orthopnea, PND, palpitations, or bleeding problems   He received education regarding AAD therapy, avoiding missed doses, pharmacy moitoring for drug to drug interactions, and concerning signs and symptoms that would prompt urgent evaluation        He was given routine post ablation discharge instructions and restrictions, and these were explained in detail  He was given a follow up appointment with Jaz Lester PA-C as well as a 1 week follow-up ECG for QTC monitoring       In terms of his medications, his beta-blocker has been changed as noted above, and Corlanor will be added  A script was given to case management to try to price this prior to discharge  He will need to continue his anticoagulation in the post ablation setting, and he will be placed on pantoprazole 40 mg daily for 1 month following his procedure  He will also be sent home on potassium supplementation, as he ran low and required replacement throughout his stay "      Patient states that he is feeling much better than he was last week  He did see cardiologist in follow-up yesterday  He remains in sinus rhythm  Patient was placed on Keflex and doxycycline for questionable infection in the right groin  He is still finishing with course of antibiotics  No pain in his groin  Patient does have history of obstructive sleep apnea  He is using his CPAP device  History of hypertension, heart disease  He is due for repeat of his diabetic parameters  He does have a lab order  He is scheduled for follow-up with me in May            Past Medical History:   Diagnosis Date    CAD (coronary artery disease)     Diabetes ([address] )     Dizziness     Erectile dysfunction     GERD (gastroesophageal reflux disease)     Hyperlipidemia     Hypertension     Shortness of breath        Family History   Problem Relation Age of Onset    Coronary artery disease Family     Diabetes Family     No Known Problems Father        Past Surgical History:   Procedure Laterality Date    CARDIAC CATHETERIZATION      ELECTRICAL CARDIOVERSION  2/4/2019             reports that he has never smoked  He quit smokeless tobacco use about 4 years ago   He reports that he does not drink alcohol or use drugs        Current Outpatient Prescriptions:     apixaban (ELIQUIS) 5 mg, Take 1 tablet (5 mg total) by mouth 2 (two) times a day, Disp: 180 tablet, Rfl: 3    aspirin 81 mg chewable tablet, Chew 1 tablet (81 mg total) daily, Disp: 100 tablet, Rfl: 1    benazepril (LOTENSIN) 40 MG tablet, Take 1 tablet (40 mg total) by mouth daily, Disp: 90 tablet, Rfl: 3    cephalexin (KEFLEX) 500 mg capsule, Take 1 capsule (500 mg total) by mouth every 8 (eight) hours for 10 days, Disp: 30 capsule, Rfl: 0    cholecalciferol (VITAMIN D3) 1,000 units tablet, Take by mouth daily, Disp: , Rfl:     co-enzyme Q-10 30 MG capsule, Take 30 mg by mouth daily  , Disp: , Rfl:     dofetilide (TIKOSYN) 500 mcg capsule, Take 1 capsule (500 mcg total) by mouth every 12 (twelve) hours, Disp: 60 capsule, Rfl: 3    doxycycline (ADOXA) 100 MG tablet, Take 1 tablet (100 mg total) by mouth 2 (two) times a day for 10 days, Disp: 20 tablet, Rfl: 0    ivabradine HCl (CORLANOR) 5 MG tablet, Take 2 tablets (10 mg total) by mouth 2 (two) times a day, Disp: 60 tablet, Rfl: 11    metoprolol succinate (TOPROL-XL) 100 mg 24 hr tablet, Take 1 5 tablets (150 mg total) by mouth 2 (two) times a day, Disp: 90 tablet, Rfl: 3    ONETOUCH DELICA LANCETS 27Y MISC, CHECK BID, Disp: , Rfl: 1    ONETOUCH VERIO test strip, CHECK TWICE DAILY, Disp: , Rfl: 3    OZEMPIC 1 MG/DOSE SOPN, INJECT 1 MG SQ Q WEEK, Disp: , Rfl: 4    pantoprazole (PROTONIX) 40 mg tablet, Take 1 tablet (40 mg total) by mouth daily in the early morning, Disp: 30 tablet, Rfl: 0    potassium chloride (K-DUR,KLOR-CON) 20 mEq tablet, Take 1 tablet (20 mEq total) by mouth 2 (two) times a day, Disp: 30 tablet, Rfl: 3    rosuvastatin (CRESTOR) 20 MG tablet, Take 20 mg by mouth, Disp: , Rfl:     SYNJARDY XR 12 5-1000 MG TB24, TK 1 T PO BID, Disp: , Rfl: 2    The following portions of the patient's history were reviewed and updated as appropriate: allergies, current medications, past family history, past medical history, past social history, past surgical history and problem list     Review of Systems   Constitutional: Negative  Negative for fatigue  HENT: Negative  Eyes: Negative  Respiratory: Negative  Negative for shortness of breath  Cardiovascular: Negative  Negative for chest pain, palpitations and leg swelling  Gastrointestinal: Negative  Endocrine: Negative  Genitourinary: Negative  Musculoskeletal: Negative  Skin: Negative  Allergic/Immunologic: Negative  Neurological: Negative  Hematological: Negative  Psychiatric/Behavioral: Negative  All other systems reviewed and are negative  Objective:    /76   Pulse 84   Temp 97 8 °F (36 6 °C) (Tympanic)   Resp 16   Ht 5' 7" (1 702 m)   Wt 104 kg (229 lb)   SpO2 98%   BMI 35 87 kg/m²      Physical Exam   Constitutional: He is oriented to person, place, and time  He appears well-developed and well-nourished  Obese   HENT:   Head: Normocephalic  Eyes: Pupils are equal, round, and reactive to light  Conjunctivae and EOM are normal    Neck: Normal range of motion  Neck supple  Cardiovascular: Normal rate, regular rhythm and normal heart sounds  No murmur heard  Pulmonary/Chest: Effort normal and breath sounds normal    Abdominal: Soft  Bowel sounds are normal    Musculoskeletal: Normal range of motion  Neurological: He is alert and oriented to person, place, and time  Psychiatric: He has a normal mood and affect  His behavior is normal  Judgment and thought content normal    Nursing note and vitals reviewed          Recent Results (from the past 1008 hour(s))   Comprehensive metabolic panel    Collection Time: 01/02/19  7:58 AM   Result Value Ref Range    Glucose, Random 104 (H) 65 - 99 mg/dL    BUN 22 7 - 25 mg/dL    Creatinine 0 94 0 70 - 1 33 mg/dL    eGFR Non  93 > OR = 60 mL/min/1 73m2    eGFR  108 > OR = 60 mL/min/1 73m2    SL AMB BUN/CREATININE RATIO NOT APPLICABLE 6 - 22 (calc)    Sodium 140 135 - 146 mmol/L    Potassium 3 9 3 5 - 5 3 mmol/L    Chloride 104 98 - 110 mmol/L    CO2 29 20 - 32 mmol/L    SL AMB CALCIUM 9 3 8 6 - 10 3 mg/dL    Protein, Total 6 5 6 1 - 8 1 g/dL    Albumin 4 4 3 6 - 5 1 g/dL    Globulin 2 1 1 9 - 3 7 g/dL (calc)    Albumin/Globulin Ratio 2 1 1 0 - 2 5 (calc)    TOTAL BILIRUBIN 0 7 0 2 - 1 2 mg/dL    Alkaline Phosphatase 80 40 - 115 U/L    AST 10 10 - 35 U/L    ALT 9 9 - 46 U/L   CBC and differential    Collection Time: 01/02/19  7:58 AM   Result Value Ref Range    White Blood Cell Count 8 5 3 8 - 10 8 Thousand/uL    Red Blood Cell Count 5 98 (H) 4 20 - 5 80 Million/uL    Hemoglobin 16 4 13 2 - 17 1 g/dL    HCT 49 3 38 5 - 50 0 %    MCV 82 4 80 0 - 100 0 fL    MCH 27 4 27 0 - 33 0 pg    MCHC 33 3 32 0 - 36 0 g/dL    RDW 13 3 11 0 - 15 0 %    Platelet Count 488 824 - 400 Thousand/uL    SL AMB MPV 11 5 7 5 - 12 5 fL    Neutrophils (Absolute) 5,738 1,500 - 7,800 cells/uL    Lymphocytes (Absolute) 1,785 850 - 3,900 cells/uL    Monocytes (Absolute) 850 200 - 950 cells/uL    Eosinophils (Absolute) 68 15 - 500 cells/uL    Basophils ABS 60 0 - 200 cells/uL    Neutrophils 67 5 %    Lymphocytes 21 0 %    Monocytes 10 0 %    Eosinophils 0 8 %    Basophils PCT 0 7 %   Protime-INR    Collection Time: 01/02/19  7:58 AM   Result Value Ref Range    INR 1 0     Prothrombin Time 10 4 9 0 - 11 5 sec   Basic metabolic panel    Collection Time: 01/22/19  6:38 AM   Result Value Ref Range    Sodium 138 136 - 145 mmol/L    Potassium 3 4 (L) 3 5 - 5 3 mmol/L    Chloride 105 100 - 108 mmol/L    CO2 25 21 - 32 mmol/L    ANION GAP 8 4 - 13 mmol/L    BUN 18 5 - 25 mg/dL    Creatinine 0 91 0 60 - 1 30 mg/dL    Glucose 116 65 - 140 mg/dL    Glucose, Fasting 116 (H) 65 - 99 mg/dL    Calcium 8 5 8 3 - 10 1 mg/dL    eGFR 97 ml/min/1 73sq m   CBC and differential    Collection Time: 01/22/19  6:38 AM   Result Value Ref Range WBC 10 28 (H) 4 31 - 10 16 Thousand/uL    RBC 6 02 (H) 3 88 - 5 62 Million/uL    Hemoglobin 16 5 12 0 - 17 0 g/dL    Hematocrit 50 5 (H) 36 5 - 49 3 %    MCV 84 82 - 98 fL    MCH 27 4 26 8 - 34 3 pg    MCHC 32 7 31 4 - 37 4 g/dL    RDW 13 1 11 6 - 15 1 %    MPV 11 1 8 9 - 12 7 fL    Platelets 751 558 - 563 Thousands/uL    nRBC 0 /100 WBCs    Neutrophils Relative 68 43 - 75 %    Immat GRANS % 0 0 - 2 %    Lymphocytes Relative 20 14 - 44 %    Monocytes Relative 10 4 - 12 %    Eosinophils Relative 1 0 - 6 %    Basophils Relative 1 0 - 1 %    Neutrophils Absolute 6 99 1 85 - 7 62 Thousands/µL    Immature Grans Absolute 0 03 0 00 - 0 20 Thousand/uL    Lymphocytes Absolute 2 02 0 60 - 4 47 Thousands/µL    Monocytes Absolute 1 07 0 17 - 1 22 Thousand/µL    Eosinophils Absolute 0 10 0 00 - 0 61 Thousand/µL    Basophils Absolute 0 07 0 00 - 0 10 Thousands/µL   Protime-INR    Collection Time: 01/22/19  6:38 AM   Result Value Ref Range    Protime 13 4 11 8 - 14 2 seconds    INR 1 01 0 86 - 1 17   ECG 12 lead    Collection Time: 01/22/19  6:59 AM   Result Value Ref Range    Ventricular Rate 98 BPM    Atrial Rate 89 BPM    [place] Interval  ms    QRSD Interval 118 ms    QT Interval 386 ms    QTC Interval 492 ms    P Axis  degrees    QRS Axis -45 degrees    T Wave Axis 86 degrees   POCT activated clotting time    Collection Time: 01/22/19  9:55 AM   Result Value Ref Range    Activated Clotting Time, i-STAT 236 (H) 89 - 137 sec    Specimen Type VENOUS    POCT activated clotting time    Collection Time: 01/22/19 10:13 AM   Result Value Ref Range    Activated Clotting Time, i-STAT 297 (H) 89 - 137 sec    Specimen Type VENOUS    POCT activated clotting time    Collection Time: 01/22/19 10:35 AM   Result Value Ref Range    Activated Clotting Time, i-STAT 329 (H) 89 - 137 sec    Specimen Type VENOUS    POCT activated clotting time    Collection Time: 01/22/19 11:01 AM   Result Value Ref Range    Activated Clotting Time, i-STAT 316 (H) 89 - 137 sec    Specimen Type VENOUS    POCT activated clotting time    Collection Time: 01/22/19 11:28 AM   Result Value Ref Range    Activated Clotting Time, i-STAT 297 (H) 89 - 137 sec    Specimen Type VENOUS    POCT activated clotting time    Collection Time: 01/22/19 11:51 AM   Result Value Ref Range    Activated Clotting Time, i-STAT 303 (H) 89 - 137 sec    Specimen Type VENOUS    POCT activated clotting time    Collection Time: 01/22/19 12:16 PM   Result Value Ref Range    Activated Clotting Time, i-STAT 121 89 - 137 sec    Specimen Type VENOUS    ECG 12 lead    Collection Time: 01/22/19 12:54 PM   Result Value Ref Range    Ventricular Rate 98 BPM    Atrial Rate 98 BPM    NM Interval 179 ms    QRSD Interval 121 ms    QT Interval 404 ms    QTC Interval 516 ms    P [place] 18 degrees    QRS Axis -62 degrees    T Wave Axis 60 degrees   Fingerstick Glucose (POCT)    Collection Time: 01/22/19  1:16 PM   Result Value Ref Range    POC Glucose 118 65 - 140 mg/dl   Fingerstick Glucose (POCT)    Collection Time: 01/22/19  3:49 PM   Result Value Ref Range    POC Glucose 97 65 - 140 mg/dl   Fingerstick Glucose (POCT)    Collection Time: 01/22/19  8:49 PM   Result Value Ref Range    POC Glucose 133 65 - 140 mg/dl   ECG 12 lead    Collection Time: 01/22/19  8:53 PM   Result Value Ref Range    Ventricular Rate 100 BPM    Atrial Rate 100 BPM    NM Interval 180 ms    QRSD Interval 120 ms    QT Interval 380 ms    QTC Interval 490 ms    P Axis 1 degrees    QRS Axis -47 degrees    T Wave Axis 71 degrees   Fingerstick Glucose (POCT)    Collection Time: 01/23/19  6:27 AM   Result Value Ref Range    POC Glucose 132 65 - 140 mg/dl   CBC    Collection Time: 01/23/19  6:33 AM   Result Value Ref Range    WBC 10 96 (H) 4 31 - 10 16 Thousand/uL    RBC 5 34 3 88 - 5 62 Million/uL    Hemoglobin 14 6 12 0 - 17 0 g/dL    Hematocrit 45 4 36 5 - 49 3 %    MCV 85 82 - 98 fL    MCH 27 3 26 8 - 34 3 pg    MCHC 32 2 31 4 - 37 4 g/dL    RDW 13 2 11 6 - 15 1 %    Platelets 679 367 - 389 Thousands/uL    MPV 11 2 8 9 - 12 7 fL   Basic metabolic panel    Collection Time: 01/23/19  6:33 AM   Result Value Ref Range    Sodium 139 136 - 145 mmol/L    Potassium 3 3 (L) 3 5 - 5 3 mmol/L    Chloride 107 100 - 108 mmol/L    CO2 27 21 - 32 mmol/L    ANION GAP 5 4 - 13 mmol/L    BUN 18 5 - 25 mg/dL    Creatinine 0 75 0 60 - 1 30 mg/dL    Glucose 124 65 - 140 mg/dL    Calcium 8 4 8 3 - 10 1 mg/dL    eGFR 106 ml/min/1 73sq m   ECG 12 lead    Collection Time: 01/23/19  9:04 AM   Result Value Ref Range    Ventricular Rate 107 BPM    Atrial Rate 107 BPM    RI Interval 184 ms    QRSD Interval 120 ms    QT Interval 374 ms    QTC Interval 499 ms    P Axis -9 degrees    QRS Axis -46 degrees    T Wave Axis 74 degrees   ECG 12 lead    Collection Time: 01/23/19  9:05 AM   Result Value Ref Range    Ventricular Rate 107 BPM    Atrial Rate 107 BPM    RI Interval 180 ms    QRSD Interval 120 ms    QT Interval 372 ms    QTC Interval 496 ms    P Axis -7 degrees    QRS Axis -46 degrees    T Wave Axis 83 degrees   Fingerstick Glucose (POCT)    Collection Time: 01/23/19 11:13 AM   Result Value Ref Range    POC Glucose 129 65 - 140 mg/dl   Fingerstick Glucose (POCT)    Collection Time: 01/23/19  5:04 PM   Result Value Ref Range    POC Glucose 121 65 - 140 mg/dl   Fingerstick Glucose (POCT)    Collection Time: 01/23/19  9:08 PM   Result Value Ref Range    POC Glucose 135 65 - 140 mg/dl   ECG 12 lead    Collection Time: 01/23/19 10:11 PM   Result Value Ref Range    Ventricular Rate 113 BPM    Atrial Rate 107 BPM    [place] Interval  ms    QRSD Interval 122 ms    QT Interval 360 ms    QTC Interval 493 ms    P Axis  degrees    QRS Axis -45 degrees    T Wave Axis 103 degrees   CBC and differential    Collection Time: 01/24/19  5:00 AM   Result Value Ref Range    WBC 10 38 (H) 4 31 - 10 16 Thousand/uL    RBC 5 12 3 88 - 5 62 Million/uL    Hemoglobin 14 0 12 0 - 17 0 g/dL    Hematocrit 43 9 36 5 - 49 3 %    MCV 86 82 - 98 fL    MCH 27 3 26 8 - 34 3 pg    MCHC 31 9 31 4 - 37 4 g/dL    RDW 13 2 11 6 - 15 1 %    MPV 11 8 8 9 - 12 7 fL    Platelets 431 414 - 494 Thousands/uL    nRBC 0 /100 WBCs    Neutrophils Relative 65 43 - 75 %    Immat GRANS % 0 0 - 2 %    Lymphocytes Relative 20 14 - 44 %    Monocytes Relative 14 (H) 4 - 12 %    Eosinophils Relative 1 0 - 6 %    Basophils Relative 0 0 - 1 %    Neutrophils Absolute 6 79 1 85 - 7 62 Thousands/µL    Immature Grans Absolute 0 03 0 00 - 0 20 Thousand/uL    Lymphocytes Absolute 2 03 0 60 - 4 47 Thousands/µL    Monocytes Absolute 1 43 (H) 0 17 - 1 22 Thousand/µL    Eosinophils Absolute 0 06 0 00 - 0 61 Thousand/µL    Basophils Absolute 0 04 0 00 - 0 10 Thousands/µL   Magnesium    Collection Time: 01/24/19  5:00 AM   Result Value Ref Range    Magnesium 2 4 1 6 - 2 6 mg/dL   Basic metabolic panel    Collection Time: 01/24/19  5:00 AM   Result Value Ref Range    Sodium 139 136 - 145 mmol/L    Potassium 3 2 (L) 3 5 - 5 3 mmol/L    Chloride 106 100 - 108 mmol/L    CO2 27 21 - 32 mmol/L    ANION GAP 6 4 - 13 mmol/L    BUN 13 5 - 25 mg/dL    Creatinine 0 70 0 60 - 1 30 mg/dL    Glucose 117 65 - 140 mg/dL    Calcium 8 1 (L) 8 3 - 10 1 mg/dL    eGFR 109 ml/min/1 73sq m   Fingerstick Glucose (POCT)    Collection Time: 01/24/19  6:17 AM   Result Value Ref Range    POC Glucose 106 65 - 140 mg/dl   ECG 12 lead    Collection Time: 01/24/19  9:41 AM   Result Value Ref Range    Ventricular Rate 102 BPM    Atrial Rate 119 BPM    [place] Interval  ms    QRSD Interval 116 ms    QT Interval 370 ms    QTC Interval 482 ms    P Axis  degrees    QRS Axis -45 degrees    T Wave Axis 83 degrees   Fingerstick Glucose (POCT)    Collection Time: 01/24/19 11:25 AM   Result Value Ref Range    POC Glucose 166 (H) 65 - 140 mg/dl   Fingerstick Glucose (POCT)    Collection Time: 01/24/19  4:24 PM   Result Value Ref Range    POC Glucose 117 65 - 140 mg/dl   ECG 12 lead    Collection Time: 01/24/19  9:15 PM   Result Value Ref Range    Ventricular Rate 98 BPM    Atrial Rate 340 BPM    [place] Interval  ms    QRSD Interval 118 ms    QT Interval 398 ms    QTC Interval 508 ms    P Axis  degrees    QRS Axis -48 degrees    T Wave Axis 78 degrees   Fingerstick Glucose (POCT)    Collection Time: 01/24/19  9:19 PM   Result Value Ref Range    POC Glucose 132 65 - 140 mg/dl   Basic metabolic panel    Collection Time: 01/25/19  5:32 AM   Result Value Ref Range    Sodium 139 136 - 145 mmol/L    Potassium 3 7 3 5 - 5 3 mmol/L    Chloride 107 100 - 108 mmol/L    CO2 26 21 - 32 mmol/L    ANION GAP 6 4 - 13 mmol/L    BUN 12 5 - 25 mg/dL    Creatinine 0 68 0 60 - 1 30 mg/dL    Glucose 121 65 - 140 mg/dL    Calcium 8 7 8 3 - 10 1 mg/dL    eGFR 111 ml/min/1 73sq m   Magnesium    Collection Time: 01/25/19  5:32 AM   Result Value Ref Range    Magnesium 2 4 1 6 - 2 6 mg/dL   Fingerstick Glucose (POCT)    Collection Time: 01/25/19  6:27 AM   Result Value Ref Range    POC Glucose 125 65 - 140 mg/dl   ECG 12 lead    Collection Time: 01/25/19  6:42 AM   Result Value Ref Range    Ventricular Rate 109 BPM    Atrial Rate 101 BPM    [place] Interval  ms    QRSD Interval 118 ms    QT Interval 336 ms    QTC Interval 452 ms    P Axis  degrees    QRS Axis -52 degrees    T Wave Axis 73 degrees   ECG 12 lead    Collection Time: 01/25/19  8:35 AM   Result Value Ref Range    Ventricular Rate 98 BPM    Atrial Rate 98 BPM    [place] Interval 194 ms    QRSD Interval 122 ms    QT Interval 514 ms    QTC Interval 656 ms    P Axis 4 degrees    QRS Axis -44 degrees    T Wave Axis 17 degrees   ECG 12 lead    Collection Time: 01/25/19 10:04 AM   Result Value Ref Range    Ventricular Rate 100 BPM    Atrial Rate 100 BPM    [place] Interval 190 ms    QRSD Interval 118 ms    QT Interval 382 ms    QTC Interval 492 ms    P Axis 4 degrees    QRS Axis -40 degrees    T Wave Axis 67 degrees   Fingerstick Glucose (POCT)    Collection Time: 01/25/19 10:47 AM   Result Value Ref Range    POC Glucose 145 (H) 65 - 140 mg/dl   ECG 12 lead    Collection Time: 01/25/19 11:33 AM   Result Value Ref Range    Ventricular Rate 100 BPM    Atrial Rate 100 BPM    [place] Interval 200 ms    QRSD Interval 122 ms    QT Interval 390 ms    QTC Interval 503 ms    P Axis 2 degrees    QRS Axis -45 degrees    T Wave Axis 56 degrees   ECG 12 lead    Collection Time: 02/04/19  7:44 AM   Result Value Ref Range    Ventricular Rate 85 BPM    Atrial Rate 340 BPM    [place] Interval  ms    QRSD Interval 118 ms    QT Interval 384 ms    QTC Interval 456 ms    P Axis  degrees    QRS Axis -49 degrees    T Wave Axis 6 degrees   Basic metabolic panel    Collection Time: 02/04/19  8:17 AM   Result Value Ref Range    Sodium 139 136 - 145 mmol/L    Potassium 3 9 3 5 - 5 3 mmol/L    Chloride 107 100 - 108 mmol/L    CO2 27 21 - 32 mmol/L    ANION GAP 5 4 - 13 mmol/L    BUN 18 5 - 25 mg/dL    Creatinine 0 88 0 60 - 1 30 mg/dL    Glucose 107 65 - 140 mg/dL    Glucose, Fasting 107 (H) 65 - 99 mg/dL    Calcium 8 7 8 3 - 10 1 mg/dL    eGFR 99 ml/min/1 73sq m   Magnesium    Collection Time: 02/04/19  8:17 AM   Result Value Ref Range    Magnesium 1 8 1 6 - 2 6 mg/dL   ECG 12 lead    Collection Time: 02/04/19  9:13 AM   Result Value Ref Range    Ventricular Rate 86 BPM    Atrial Rate 86 BPM    [place] Interval 204 ms    QRSD Interval 122 ms    QT Interval 414 ms    QTC Interval 495 ms    P Axis 3 degrees    QRS Axis -45 degrees    T Wave Axis 32 degrees       Assessment/Plan:    Persistent atrial fibrillation (HCC)  Patient is now status post ablation and cardioversion  Patient is to follow up with electrophysiologist and cardiologist as scheduled  He is anticoagulated  He is presently in a normal sinus rhythm  Continue on Eliquis    Hypertension  Hypertension remains well controlled  Patient is presently on benazepril, metoprolol  Continue same  Obstructive sleep apnea on CPAP  Patient has lost weight  Commended on this  Follow up with pulmonologist as scheduled  Remains on CPAP  CAD (coronary artery disease)  Control risk factors for heart disease  Patient remains on statin, beta-blocker, anticoagulation with Eliquis  Follow up with cardiologist as scheduled    Hyperlipidemia  Remains on Crestor  Goal LDL less than 70  Managed by cardiologist     Moderate obesity  Patient continues to lose weight  He has made significant dietary changes  He will be returning back to work  Continue with weight loss  Problem List Items Addressed This Visit     CAD (coronary artery disease)     Control risk factors for heart disease  Patient remains on statin, beta-blocker, anticoagulation with Eliquis  Follow up with cardiologist as scheduled         Hyperlipidemia     Remains on Crestor  Goal LDL less than 70  Managed by cardiologist          Hypertension     Hypertension remains well controlled  Patient is presently on benazepril, metoprolol  Continue same  Moderate obesity     Patient continues to lose weight  He has made significant dietary changes  He will be returning back to work  Continue with weight loss  Persistent atrial fibrillation ([address] ) - Primary     Patient is now status post ablation and cardioversion  Patient is to follow up with electrophysiologist and cardiologist as scheduled  He is anticoagulated  He is presently in a normal sinus rhythm    Continue on Eliquis               Patient is to follow up as scheduled in May

## 2019-02-08 NOTE — ASSESSMENT & PLAN NOTE
Patient is now status post ablation and cardioversion  Patient is to follow up with electrophysiologist and cardiologist as scheduled  He is anticoagulated  He is presently in a normal sinus rhythm    Continue on Eliquis

## 2019-02-08 NOTE — ASSESSMENT & PLAN NOTE
Control risk factors for heart disease  Patient remains on statin, beta-blocker, anticoagulation with Eliquis    Follow up with cardiologist as scheduled

## 2019-02-08 NOTE — ASSESSMENT & PLAN NOTE
Patient continues to lose weight  He has made significant dietary changes  He will be returning back to work  Continue with weight loss

## 2019-02-09 LAB
LEFT EYE DIABETIC RETINOPATHY: NORMAL
RIGHT EYE DIABETIC RETINOPATHY: NORMAL

## 2019-02-15 DIAGNOSIS — R00.0 INAPPROPRIATE SINUS TACHYCARDIA: ICD-10-CM

## 2019-02-17 DIAGNOSIS — I48.19 PERSISTENT ATRIAL FIBRILLATION (HCC): ICD-10-CM

## 2019-02-17 RX ORDER — PANTOPRAZOLE SODIUM 40 MG/1
TABLET, DELAYED RELEASE ORAL
Qty: 30 TABLET | Refills: 0 | OUTPATIENT
Start: 2019-02-17

## 2019-02-19 ENCOUNTER — OFFICE VISIT (OUTPATIENT)
Dept: CARDIOLOGY CLINIC | Facility: CLINIC | Age: 54
End: 2019-02-19

## 2019-02-19 VITALS — DIASTOLIC BLOOD PRESSURE: 88 MMHG | SYSTOLIC BLOOD PRESSURE: 136 MMHG | HEART RATE: 81 BPM

## 2019-02-19 DIAGNOSIS — I48.19 PERSISTENT ATRIAL FIBRILLATION (HCC): Primary | ICD-10-CM

## 2019-02-19 NOTE — PROGRESS NOTES
Pt arrived for BP and HR check  BP is 136/88 and HR 81  Pt is pleased stating it has gotten much better  No complaints

## 2019-03-05 ENCOUNTER — OFFICE VISIT (OUTPATIENT)
Dept: CARDIOLOGY CLINIC | Facility: CLINIC | Age: 54
End: 2019-03-05
Payer: COMMERCIAL

## 2019-03-05 VITALS
WEIGHT: 227.1 LBS | SYSTOLIC BLOOD PRESSURE: 142 MMHG | HEART RATE: 80 BPM | BODY MASS INDEX: 35.64 KG/M2 | DIASTOLIC BLOOD PRESSURE: 90 MMHG | HEIGHT: 67 IN

## 2019-03-05 DIAGNOSIS — I48.19 PERSISTENT ATRIAL FIBRILLATION (HCC): Primary | ICD-10-CM

## 2019-03-05 PROCEDURE — 99214 OFFICE O/P EST MOD 30 MIN: CPT | Performed by: PHYSICIAN ASSISTANT

## 2019-03-05 PROCEDURE — 93000 ELECTROCARDIOGRAM COMPLETE: CPT | Performed by: PHYSICIAN ASSISTANT

## 2019-03-05 RX ORDER — POTASSIUM CHLORIDE 20 MEQ/1
20 TABLET, EXTENDED RELEASE ORAL 2 TIMES DAILY
Qty: 180 TABLET | Refills: 3 | Status: SHIPPED | OUTPATIENT
Start: 2019-03-05 | End: 2019-10-10 | Stop reason: SDUPTHER

## 2019-03-05 NOTE — PROGRESS NOTES
EP Consult Outpatient     [facility]  1965  [id number]  HEART [facility] VASCULAR [facility]  [address] [place] [address]        Assessment/Plan     1  Persistent atrial fibrillation, asymptomatic    * patient is currently in NSR! W/ a HR of 80bpm   * his CV is 4 (T2DM, HTN, CAD, NICMP) and he is on appropriate AC w/ eliquis 5mg PO BID    * he did undergo ELZBIETA w/ DCCV in October 2018 and is now s/p cryo PVI w/ tikosyn loading w/ need for 2nd DCCV on 2-4-19 due to returning to atrial fibrillation  Was seen on 2-7-19 and in NSR   * EF on limited echo was 30% in jan 2019    * at this time due to patient being in normal sinus rhythm will continue his current medication regimen of Toprol 150 mg twice daily, Corlenor and Tikosyn 500 mcg twice daily  His QTC is within normal limits  2  CAD s/p PCI to LAD (2015)   3  HTN   4  T2DM  5  NORA on CPAP   6  Nonischemic cardiomyopathy   * EF in January 2019 was 30% with negative stress test in the past few months, depressed EF likely secondary to tachycardia  * on GDMT with ACE-inhibitor and Toprol-XL   * now that he is in rate and rhythm control will assess his ejection fraction at the end of April if he continues to maintain normal sinus rhythm which did have an accurate assessment of his ejection fraction and can determine the need for primary prevention dual-chamber ICD placement if his EF is still less than 35%  History of Present Illness     HPI/INTERVAL HISTORY: Mary Lord is a 48 y o  male with a history of obesity, persistentatrial fibrillation AC w/ eliquis s/p DCCV (, 2-4-19) s/p cryo PVI and tikosyn loading , NICMP EF 30% (1-2019), HTN, T2DM, HLD, NORA on CPAP, CAD s/p PCI to LAD (2015) who presents to the EP office today for f/u regarding atrial fibrillation management  He is normally a patient of Dr Anitha Sibley       12-13-18:   Patient was referred to the EP office by his primary Cardiologist Dr Ulises Gu who he has been following with for 6 years  In September 2017 patient was found to be in NSR on routine follow up but in his routine follow up in September 2018 ECG demonstrated new onset atrial fibrillation w/ CVR  Patient denied having any symptoms of LH, dizziness, fatigue, palpitations or SOB  He works as a  for [employer] and has been able to carry out his work without any noted limitations  Patient denies drinking ANY alcohol but does have a diagnosis of NORA for which he is moderately compliant with his CPAP  Due to cervical neck issues which are being treated he uses his CPAP till about 4AM each night needing to take it off due to pain in his neck  Due to his continued atrial fibrillation Dr Doris Vines had patient undergo ELZBIETA w/ DCCV on 10-24-18 which revealed an EF of 40% with mild to mod LA size  Patient was successfully cardioverted to NSR w/ referral to our office for further management  After his cardioversion patient denies having any improvement in functional capacity  He has never had any cardiac surgeries in the past, no hx of MAZE, CABG or valve replacements  He has lost 40lbs this year and is excited to lose more weight  3/5/19:   Since last seen patient did undergo atrial fibrillation ablation w/ tikosyn loading on 1-22-19 w/ also being started on corlenor due to his inappropriate sinus tachycardia  Unfortunately shortly after returning home patient became symptomatic and was again found to be in atrial fibrillation prompting DCCV on 2-4-19 which was successful for restoration of NSR  On 2-7-19 he was evaluated by Aisha Michael PA-C and Dr Riri Laguna where he was found to be in NSR w/ plans for 1 month f/u to determine if he will need a second atrial fibrillation ablation  Of note last echo performed in Jan 2019 showed a declining EF of 30%  Today patient has no major concerns or complaints and feels extremely well    He is back to working full duty as a  and 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 at the end of March  He is looking forward to golfing throughout the spring/summer and continues to lose weight and is down 5 lb since his hospital stay  Denies any palpitations, lightheadedness, dizziness, shortness of breath or chest discomfort  Brief afib hx:   : s/p ELZBIETA w/ DCCV - successful for restoration to NSR for short period of time   : seen by EP planned for cryo PVI and tikosyn loading   1-2019: undergoes cryo PVI w/ tikosyn loading  2-2019: found to be in a fib in office visit, underwent DCCV which was successful for restoration of NSR  Review of Systems  ROS as noted above, otherwise 12 point review of systems was performed and is negative         Historical Information   Social History     Socioeconomic History    Marital status: /Civil Union     Spouse name: Not on file    Number of children: Not on file    Years of education: Not on file    Highest education level: Not on file   Occupational History    Not on file   Social Needs    Financial resource strain: Not on file    Food insecurity:     Worry: Not on file     Inability: Not on file    Transportation needs:     Medical: Not on file     Non-medical: Not on file   Tobacco Use    Smoking status: Never Smoker    Smokeless tobacco: Former User   Substance and Sexual Activity    Alcohol use: No    Drug use: No    Sexual activity: Not on file   Lifestyle    Physical activity:     Days per week: Not on file     Minutes per session: Not on file    Stress: Not on file   Relationships    Social connections:     Talks on phone: Not on file     Gets together: Not on file     Attends Rastafarian service: Not on file     Active member of club or organization: Not on file     Attends meetings of clubs or organizations: Not on file     Relationship status: Not on file    Intimate partner violence:     Fear of current or ex partner: Not on file     Emotionally abused: Not on file     Physically abused: Not on file     Forced sexual activity: Not on file   Other Topics Concern    Not on file   Social History Narrative    Not on file     Past Medical History:   Diagnosis Date    CAD (coronary artery disease)     Diabetes ([address] )     Dizziness     Erectile dysfunction     GERD (gastroesophageal reflux disease)     Hyperlipidemia     Hypertension     Shortness of breath      Past Surgical History:   Procedure Laterality Date    CARDIAC CATHETERIZATION      ELECTRICAL CARDIOVERSION  2/4/2019          Social History     Substance and Sexual Activity   Alcohol Use No     Social History     Substance and Sexual Activity   Drug Use No     Social History     Tobacco Use   Smoking Status Never Smoker   Smokeless Tobacco Former User     Family History   Problem Relation Age of Onset    Coronary artery disease Family     Diabetes Family     No Known Problems Father        Meds/Allergies       Current Outpatient Medications:     apixaban (ELIQUIS) 5 mg, Take 1 tablet (5 mg total) by mouth 2 (two) times a day, Disp: 180 tablet, Rfl: 3    aspirin 81 mg chewable tablet, Chew 1 tablet (81 mg total) daily, Disp: 100 tablet, Rfl: 1    benazepril (LOTENSIN) 40 MG tablet, Take 1 tablet (40 mg total) by mouth daily, Disp: 90 tablet, Rfl: 3    cholecalciferol (VITAMIN D3) 1,000 units tablet, Take by mouth daily, Disp: , Rfl:     co-enzyme Q-10 30 MG capsule, Take 30 mg by mouth daily  , Disp: , Rfl:     dofetilide (TIKOSYN) 500 mcg capsule, Take 1 capsule (500 mcg total) by mouth every 12 (twelve) hours, Disp: 60 capsule, Rfl: 3    ivabradine HCl (CORLANOR) 5 MG tablet, Take 2 tablets (10 mg total) by mouth 2 (two) times a day, Disp: 360 tablet, Rfl: 3    metoprolol succinate (TOPROL-XL) 100 mg 24 hr tablet, Take 1 5 tablets (150 mg total) by mouth 2 (two) times a day, Disp: 90 tablet, Rfl: 3    ONETOUCH DELICA LANCETS 28M MISC, CHECK BID, Disp: , Rfl: 1    ONETOUCH VERIO test strip, CHECK TWICE DAILY, Disp: , Rfl: 3    OZEMPIC 1 MG/DOSE SOPN, INJECT 1 MG SQ Q WEEK, Disp: , Rfl: 4    pantoprazole (PROTONIX) 40 mg tablet, Take 1 tablet (40 mg total) by mouth daily in the early morning, Disp: 30 tablet, Rfl: 0    potassium chloride (K-DUR,KLOR-CON) 20 mEq tablet, Take 1 tablet (20 mEq total) by mouth 2 (two) times a day, Disp: 30 tablet, Rfl: 3    rosuvastatin (CRESTOR) 20 MG tablet, Take 20 mg by mouth, Disp: , Rfl:     SYNJARDY XR 12 5-1000 MG TB24, TK 1 T PO BID, Disp: , Rfl: 2    Allergies   Allergen Reactions    Dye [Iodinated Diagnostic Agents] Hives and GI Intolerance     Definity - name of contrast         Objective   Vitals: There were no vitals taken for this visit  Physical Exam   Constitutional: He is oriented to person, place, and time  He appears well-developed and well-nourished  HENT:   Head: Normocephalic and atraumatic  Eyes: Pupils are equal, round, and reactive to light  EOM are normal    Neck: Normal range of motion  Neck supple  Cardiovascular: Normal rate  An irregularly irregular rhythm present  Pulmonary/Chest: Effort normal and breath sounds normal    Abdominal: Soft  Bowel sounds are normal    Musculoskeletal: Normal range of motion  Neurological: He is alert and oriented to person, place, and time  Skin: Skin is warm and dry  Psychiatric: He has a normal mood and affect  Labs:not applicable    Imaging: I have personally reviewed pertinent reports  ECHO: No results found for this or any previous visit  CATH/STRESS TEST:   SUMMARY:  -  Stress results: Duration of exercise was 6 min and 1 sec  Target heart rate was achieved  There was resting hypertension with a hypertensive blood pressure response to stress  There was no chest pain during stress  -  ECG conclusions: The stress ECG was equivocal for ischemia  The stress ECG was non-diagnostic due to baseline left bundle branch block  -  Perfusion imaging:  There was a small, mildly severe, predominantly fixed myocardial perfusion defect of the apical wall and basal inferior walls, slightly worse on rest images, likely due to artifact   -  Gated SPECT: The calculated left ventricular ejection fraction was 32 %  There was diffuse moderately reduced myocardial thickening and motion      IMPRESSIONS: Abnormal study after maximal exercise  There was image artifact, without diagnostic evidence for perfusion abnormality   Left ventricular systolic function was reduced, without distinct regional wall motion abnormalities        EKG:   Normal sinus rhythm, heart rate 80 beats per minute, QTC within normal limits

## 2019-04-29 ENCOUNTER — HOSPITAL ENCOUNTER (OUTPATIENT)
Dept: NON INVASIVE DIAGNOSTICS | Facility: CLINIC | Age: 54
Discharge: HOME | End: 2019-04-29
Payer: COMMERCIAL

## 2019-04-29 DIAGNOSIS — I48.19 PERSISTENT ATRIAL FIBRILLATION (HCC): ICD-10-CM

## 2019-04-29 PROCEDURE — 93308 TTE F-UP OR LMTD: CPT

## 2019-04-29 PROCEDURE — 93308 TTE F-UP OR LMTD: CPT | Performed by: INTERNAL MEDICINE

## 2019-04-29 PROCEDURE — 93321 DOPPLER ECHO F-UP/LMTD STD: CPT | Performed by: INTERNAL MEDICINE

## 2019-04-29 PROCEDURE — 93325 DOPPLER ECHO COLOR FLOW MAPG: CPT | Performed by: INTERNAL MEDICINE

## 2019-05-08 ENCOUNTER — OFFICE VISIT (OUTPATIENT)
Dept: FAMILY MEDICINE CLINIC | Facility: CLINIC | Age: 54
End: 2019-05-08
Payer: COMMERCIAL

## 2019-05-08 VITALS
HEART RATE: 82 BPM | DIASTOLIC BLOOD PRESSURE: 88 MMHG | WEIGHT: 228.8 LBS | BODY MASS INDEX: 35.91 KG/M2 | SYSTOLIC BLOOD PRESSURE: 140 MMHG | OXYGEN SATURATION: 98 % | HEIGHT: 67 IN | RESPIRATION RATE: 18 BRPM

## 2019-05-08 DIAGNOSIS — E11.9 TYPE 2 DIABETES MELLITUS WITHOUT COMPLICATION, WITHOUT LONG-TERM CURRENT USE OF INSULIN (HCC): Primary | ICD-10-CM

## 2019-05-08 DIAGNOSIS — I25.119 CORONARY ARTERY DISEASE INVOLVING NATIVE CORONARY ARTERY OF NATIVE HEART WITH ANGINA PECTORIS (HCC): ICD-10-CM

## 2019-05-08 DIAGNOSIS — E78.2 MIXED HYPERLIPIDEMIA: ICD-10-CM

## 2019-05-08 DIAGNOSIS — F52.21 ERECTILE DYSFUNCTION OF NON-ORGANIC ORIGIN: ICD-10-CM

## 2019-05-08 DIAGNOSIS — Z12.5 PROSTATE CANCER SCREENING: ICD-10-CM

## 2019-05-08 DIAGNOSIS — I48.19 PERSISTENT ATRIAL FIBRILLATION (HCC): ICD-10-CM

## 2019-05-08 DIAGNOSIS — I10 ESSENTIAL HYPERTENSION: ICD-10-CM

## 2019-05-08 DIAGNOSIS — E66.8 MODERATE OBESITY: ICD-10-CM

## 2019-05-08 PROBLEM — N52.2 DRUG-INDUCED ERECTILE DYSFUNCTION: Status: ACTIVE | Noted: 2019-05-08

## 2019-05-08 PROCEDURE — 99215 OFFICE O/P EST HI 40 MIN: CPT | Performed by: FAMILY MEDICINE

## 2019-05-08 RX ORDER — SILDENAFIL 25 MG/1
25 TABLET, FILM COATED ORAL DAILY PRN
Qty: 10 TABLET | Refills: 0 | Status: SHIPPED | OUTPATIENT
Start: 2019-05-08 | End: 2020-12-11 | Stop reason: HOSPADM

## 2019-05-15 DIAGNOSIS — I48.19 PERSISTENT ATRIAL FIBRILLATION (HCC): ICD-10-CM

## 2019-05-15 PROCEDURE — 2022F DILAT RTA XM EVC RTNOPTHY: CPT | Performed by: PHYSICIAN ASSISTANT

## 2019-05-15 RX ORDER — METOPROLOL SUCCINATE 100 MG/1
TABLET, EXTENDED RELEASE ORAL
Qty: 90 TABLET | Refills: 0 | Status: SHIPPED | OUTPATIENT
Start: 2019-05-15 | End: 2019-06-09 | Stop reason: SDUPTHER

## 2019-06-09 DIAGNOSIS — I48.19 PERSISTENT ATRIAL FIBRILLATION (HCC): ICD-10-CM

## 2019-06-11 DIAGNOSIS — I48.19 PERSISTENT ATRIAL FIBRILLATION (HCC): ICD-10-CM

## 2019-06-11 RX ORDER — DOFETILIDE 0.5 MG/1
500 CAPSULE ORAL EVERY 12 HOURS SCHEDULED
Qty: 60 CAPSULE | Refills: 11 | Status: SHIPPED | OUTPATIENT
Start: 2019-06-11 | End: 2020-06-15

## 2019-06-18 RX ORDER — COLCHICINE 0.6 MG/1
TABLET, FILM COATED ORAL
Refills: 0 | COMMUNITY
Start: 2019-05-24 | End: 2019-06-19 | Stop reason: ALTCHOICE

## 2019-06-18 RX ORDER — METOPROLOL SUCCINATE 100 MG/1
TABLET, EXTENDED RELEASE ORAL
Qty: 90 TABLET | Refills: 0 | Status: SHIPPED | OUTPATIENT
Start: 2019-06-18 | End: 2019-06-21 | Stop reason: SDUPTHER

## 2019-06-19 ENCOUNTER — OFFICE VISIT (OUTPATIENT)
Dept: FAMILY MEDICINE CLINIC | Facility: CLINIC | Age: 54
End: 2019-06-19
Payer: COMMERCIAL

## 2019-06-19 VITALS
BODY MASS INDEX: 36.03 KG/M2 | OXYGEN SATURATION: 97 % | SYSTOLIC BLOOD PRESSURE: 138 MMHG | WEIGHT: 229.6 LBS | HEART RATE: 89 BPM | DIASTOLIC BLOOD PRESSURE: 86 MMHG | RESPIRATION RATE: 18 BRPM | HEIGHT: 67 IN

## 2019-06-19 DIAGNOSIS — L82.1 SEBORRHEIC KERATOSIS: ICD-10-CM

## 2019-06-19 DIAGNOSIS — M54.2 CERVICALGIA: ICD-10-CM

## 2019-06-19 DIAGNOSIS — E11.9 TYPE 2 DIABETES MELLITUS WITHOUT COMPLICATION, WITHOUT LONG-TERM CURRENT USE OF INSULIN (HCC): Primary | ICD-10-CM

## 2019-06-19 PROCEDURE — 99213 OFFICE O/P EST LOW 20 MIN: CPT | Performed by: FAMILY MEDICINE

## 2019-06-19 PROCEDURE — 3008F BODY MASS INDEX DOCD: CPT | Performed by: FAMILY MEDICINE

## 2019-06-19 PROCEDURE — 1036F TOBACCO NON-USER: CPT | Performed by: FAMILY MEDICINE

## 2019-06-21 ENCOUNTER — TELEPHONE (OUTPATIENT)
Dept: FAMILY MEDICINE CLINIC | Facility: CLINIC | Age: 54
End: 2019-06-21

## 2019-06-21 DIAGNOSIS — M10.022 ACUTE IDIOPATHIC GOUT OF LEFT ELBOW: Primary | ICD-10-CM

## 2019-06-21 DIAGNOSIS — I48.19 PERSISTENT ATRIAL FIBRILLATION (HCC): ICD-10-CM

## 2019-06-21 RX ORDER — COLCHICINE 0.6 MG/1
0.6 TABLET ORAL 2 TIMES DAILY
Qty: 10 TABLET | Refills: 0 | Status: SHIPPED | OUTPATIENT
Start: 2019-06-21 | End: 2019-10-10 | Stop reason: ALTCHOICE

## 2019-06-21 RX ORDER — METOPROLOL SUCCINATE 100 MG/1
150 TABLET, EXTENDED RELEASE ORAL 2 TIMES DAILY
Qty: 270 TABLET | Refills: 2 | Status: SHIPPED | OUTPATIENT
Start: 2019-06-21 | End: 2020-02-25

## 2019-09-24 LAB
ALBUMIN SERPL-MCNC: 4.4 G/DL (ref 3.6–5.1)
ALBUMIN/CREAT UR: 46 MCG/MG CREAT
ALBUMIN/GLOB SERPL: 2.2 (CALC) (ref 1–2.5)
ALP SERPL-CCNC: 80 U/L (ref 40–115)
ALT SERPL-CCNC: 13 U/L (ref 9–46)
AST SERPL-CCNC: 14 U/L (ref 10–35)
BASOPHILS # BLD AUTO: 112 CELLS/UL (ref 0–200)
BASOPHILS NFR BLD AUTO: 1.1 %
BILIRUB SERPL-MCNC: 0.5 MG/DL (ref 0.2–1.2)
BUN SERPL-MCNC: 19 MG/DL (ref 7–25)
BUN/CREAT SERPL: NORMAL (CALC) (ref 6–22)
CALCIUM SERPL-MCNC: 9.4 MG/DL (ref 8.6–10.3)
CHLORIDE SERPL-SCNC: 103 MMOL/L (ref 98–110)
CHOLEST SERPL-MCNC: 141 MG/DL
CHOLEST/HDLC SERPL: 4 (CALC)
CO2 SERPL-SCNC: 31 MMOL/L (ref 20–32)
CREAT SERPL-MCNC: 1 MG/DL (ref 0.7–1.33)
CREAT UR-MCNC: 170 MG/DL (ref 20–320)
EOSINOPHIL # BLD AUTO: 622 CELLS/UL (ref 15–500)
EOSINOPHIL NFR BLD AUTO: 6.1 %
ERYTHROCYTE [DISTWIDTH] IN BLOOD BY AUTOMATED COUNT: 13.2 % (ref 11–15)
GLOBULIN SER CALC-MCNC: 2 G/DL (CALC) (ref 1.9–3.7)
GLUCOSE SERPL-MCNC: 96 MG/DL (ref 65–99)
HBA1C MFR BLD: 5.5 % OF TOTAL HGB
HCT VFR BLD AUTO: 49.4 % (ref 38.5–50)
HDLC SERPL-MCNC: 35 MG/DL
HGB BLD-MCNC: 16.3 G/DL (ref 13.2–17.1)
LDLC SERPL CALC-MCNC: 89 MG/DL (CALC)
LYMPHOCYTES # BLD AUTO: 2040 CELLS/UL (ref 850–3900)
LYMPHOCYTES NFR BLD AUTO: 20 %
MCH RBC QN AUTO: 28.2 PG (ref 27–33)
MCHC RBC AUTO-ENTMCNC: 33 G/DL (ref 32–36)
MCV RBC AUTO: 85.5 FL (ref 80–100)
MICROALBUMIN UR-MCNC: 7.9 MG/DL
MONOCYTES # BLD AUTO: 1000 CELLS/UL (ref 200–950)
MONOCYTES NFR BLD AUTO: 9.8 %
NEUTROPHILS # BLD AUTO: 6426 CELLS/UL (ref 1500–7800)
NEUTROPHILS NFR BLD AUTO: 63 %
NONHDLC SERPL-MCNC: 106 MG/DL (CALC)
PLATELET # BLD AUTO: 195 THOUSAND/UL (ref 140–400)
PMV BLD REES-ECKER: 11.2 FL (ref 7.5–12.5)
POTASSIUM SERPL-SCNC: 4.6 MMOL/L (ref 3.5–5.3)
PROT SERPL-MCNC: 6.4 G/DL (ref 6.1–8.1)
PSA SERPL-MCNC: 1 NG/ML
RBC # BLD AUTO: 5.78 MILLION/UL (ref 4.2–5.8)
SL AMB EGFR AFRICAN AMERICAN: 100 ML/MIN/1.73M2
SL AMB EGFR NON AFRICAN AMERICAN: 86 ML/MIN/1.73M2
SODIUM SERPL-SCNC: 142 MMOL/L (ref 135–146)
TRIGL SERPL-MCNC: 78 MG/DL
TSH SERPL-ACNC: 1.89 MIU/L (ref 0.4–4.5)
WBC # BLD AUTO: 10.2 THOUSAND/UL (ref 3.8–10.8)

## 2019-09-30 LAB
CREAT ?TM UR-SCNC: 144 UMOL/L
EXT MICROALBUMIN URINE RANDOM: 10.3
HBA1C MFR BLD HPLC: 5.5 %
MICROALBUMIN/CREAT UR: 72 MG/G{CREAT}

## 2019-10-09 RX ORDER — CARVEDILOL 12.5 MG/1
12.5 TABLET ORAL
COMMUNITY
End: 2019-10-10 | Stop reason: ALTCHOICE

## 2019-10-10 ENCOUNTER — OFFICE VISIT (OUTPATIENT)
Dept: FAMILY MEDICINE CLINIC | Facility: CLINIC | Age: 54
End: 2019-10-10
Payer: COMMERCIAL

## 2019-10-10 VITALS
OXYGEN SATURATION: 97 % | HEART RATE: 82 BPM | SYSTOLIC BLOOD PRESSURE: 146 MMHG | RESPIRATION RATE: 18 BRPM | HEIGHT: 67 IN | DIASTOLIC BLOOD PRESSURE: 92 MMHG | BODY MASS INDEX: 36.7 KG/M2 | WEIGHT: 233.8 LBS

## 2019-10-10 DIAGNOSIS — M10.022 ACUTE IDIOPATHIC GOUT OF LEFT ELBOW: ICD-10-CM

## 2019-10-10 DIAGNOSIS — N52.2 DRUG-INDUCED ERECTILE DYSFUNCTION: ICD-10-CM

## 2019-10-10 DIAGNOSIS — I48.91 NEW ONSET ATRIAL FIBRILLATION (HCC): ICD-10-CM

## 2019-10-10 DIAGNOSIS — I48.19 PERSISTENT ATRIAL FIBRILLATION (HCC): ICD-10-CM

## 2019-10-10 DIAGNOSIS — E11.9 TYPE 2 DIABETES MELLITUS WITHOUT COMPLICATION, WITHOUT LONG-TERM CURRENT USE OF INSULIN (HCC): Primary | ICD-10-CM

## 2019-10-10 DIAGNOSIS — Z23 FLU VACCINE NEED: ICD-10-CM

## 2019-10-10 DIAGNOSIS — I25.119 CORONARY ARTERY DISEASE INVOLVING NATIVE CORONARY ARTERY OF NATIVE HEART WITH ANGINA PECTORIS (HCC): ICD-10-CM

## 2019-10-10 DIAGNOSIS — E78.2 MIXED HYPERLIPIDEMIA: ICD-10-CM

## 2019-10-10 DIAGNOSIS — I10 ESSENTIAL HYPERTENSION: ICD-10-CM

## 2019-10-10 DIAGNOSIS — E66.8 MODERATE OBESITY: ICD-10-CM

## 2019-10-10 PROCEDURE — 99215 OFFICE O/P EST HI 40 MIN: CPT | Performed by: FAMILY MEDICINE

## 2019-10-10 PROCEDURE — 90471 IMMUNIZATION ADMIN: CPT | Performed by: FAMILY MEDICINE

## 2019-10-10 PROCEDURE — 90682 RIV4 VACC RECOMBINANT DNA IM: CPT | Performed by: FAMILY MEDICINE

## 2019-10-10 RX ORDER — FEBUXOSTAT 40 MG/1
TABLET, FILM COATED ORAL
COMMUNITY
Start: 2019-10-09 | End: 2019-10-10

## 2019-10-10 RX ORDER — AMLODIPINE BESYLATE 5 MG/1
TABLET ORAL
COMMUNITY
Start: 2019-10-09 | End: 2019-10-10

## 2019-10-10 RX ORDER — ROSUVASTATIN CALCIUM 20 MG/1
20 TABLET, COATED ORAL DAILY
Qty: 90 TABLET | Refills: 1 | Status: SHIPPED | OUTPATIENT
Start: 2019-10-10 | End: 2021-08-26 | Stop reason: SDUPTHER

## 2019-10-10 RX ORDER — AMLODIPINE BESYLATE AND BENAZEPRIL HYDROCHLORIDE 5; 40 MG/1; MG/1
1 CAPSULE ORAL DAILY
Qty: 90 CAPSULE | Refills: 1 | Status: SHIPPED | OUTPATIENT
Start: 2019-10-10 | End: 2020-03-20 | Stop reason: SDUPTHER

## 2019-10-10 RX ORDER — POTASSIUM CHLORIDE 20 MEQ/1
20 TABLET, EXTENDED RELEASE ORAL 2 TIMES DAILY
Qty: 180 TABLET | Refills: 3 | Status: SHIPPED | OUTPATIENT
Start: 2019-10-10 | End: 2019-10-23 | Stop reason: HOSPADM

## 2019-10-10 NOTE — ASSESSMENT & PLAN NOTE
Lab Results   Component Value Date    HGBA1C 5 5 09/30/2019     Hemoglobin A1c of 5 5%  Follow up with endocrinologist as scheduled    Continue on insulin regimen as prescribed by endocrinologist

## 2019-10-10 NOTE — ASSESSMENT & PLAN NOTE
No significant swelling at this time of his left elbow  Check uric acid level  Increase hydration  Avoid alcohol  Cut down on his red meat    Do not start Uloric at this time

## 2019-10-10 NOTE — ASSESSMENT & PLAN NOTE
Continue to manage risk factors including diabetes, hypertension, hyperlipidemia  Hypertension is not ideally controlled  Follow-up with Cardiology as scheduled    Weight loss would be beneficial

## 2019-10-10 NOTE — ASSESSMENT & PLAN NOTE
Not ideally controlled  Agree with placing patient on amlodipine however to minimize his pill burden will place him on combination amlodipine/benazepril    Patient will have blood pressure checked while at cardiologist office in 2 weeks

## 2019-10-10 NOTE — ASSESSMENT & PLAN NOTE
Hyperlipidemia is fairly well controlled on Crestor 20 mg once daily  Continue same  Weight loss would be beneficial   Watch dietary intake of fats and cholesterol

## 2019-10-10 NOTE — PROGRESS NOTES
Subjective:      Patient ID: Gigi Espinal is a 48 y o  male  Patient presents for 6 month follow-up of chronic conditions  He does see endocrinologist in regards to diabetes  Patient does have history of coronary artery disease, atrial fibrillation  He does see Cardiology in follow-up  Hypertension is not ideally controlled  Last hemoglobin A1c 5 5%  Total cholesterol 141, HDL 35, LDL 89  Essentially normal CBC, CMP  Patient actually gained weight approximately 5 lb  He states that he was on vacation quite a bit and had some dietary indiscretion  Patient did have follow-up with endocrinologist   Endocrinologist wanted him to start on amlodipine as well as Uloric  Amlodipine for his hypertension in Uloric for occasional gouty flares  Patient has been drinking water  No recent gouty flare  Last uric acid was at 3 6  He does not drink alcohol on a regular basis  When he and his wife go out to dinner he may have 1 beer  Generally has been feeling well  He is scheduled to see electrophysiologist on October 23rd he would like to receive flu shot      Past Medical History:   Diagnosis Date    CAD (coronary artery disease)     Diabetes ([address] )     Dizziness     Erectile dysfunction     GERD (gastroesophageal reflux disease)     Hyperlipidemia     Hypertension     Shortness of breath        Family History   Problem Relation Age of Onset    Coronary artery disease Family     Diabetes Family     No Known Problems Father        Past Surgical History:   Procedure Laterality Date    CARDIAC CATHETERIZATION      ELECTRICAL CARDIOVERSION  2/4/2019             reports that he has never smoked  He quit smokeless tobacco use about 5 years ago  He reports that he does not drink alcohol or use drugs        Current Outpatient Medications:     apixaban (ELIQUIS) 5 mg, Take 1 tablet (5 mg total) by mouth 2 (two) times a day, Disp: 180 tablet, Rfl: 3    aspirin 81 mg chewable tablet, Chew 1 tablet (81 mg total) daily, Disp: 100 tablet, Rfl: 1    cholecalciferol (VITAMIN D3) 1,000 units tablet, Take by mouth daily, Disp: , Rfl:     co-enzyme Q-10 30 MG capsule, Take 30 mg by mouth daily  , Disp: , Rfl:     dofetilide (TIKOSYN) 500 mcg capsule, Take 1 capsule (500 mcg total) by mouth every 12 (twelve) hours, Disp: 60 capsule, Rfl: 11    ivabradine HCl (CORLANOR) 5 MG tablet, Take 2 tablets (10 mg total) by mouth 2 (two) times a day, Disp: 360 tablet, Rfl: 3    metoprolol succinate (TOPROL-XL) 100 mg 24 hr tablet, Take 1 5 tablets (150 mg total) by mouth 2 (two) times a day, Disp: 270 tablet, Rfl: 2    ONETOUCH DELICA LANCETS 59L MISC, CHECK BID, Disp: , Rfl: 1    ONETOUCH VERIO test strip, CHECK TWICE DAILY, Disp: , Rfl: 3    OZEMPIC 1 MG/DOSE SOPN, INJECT 1 MG SQ Q WEEK, Disp: , Rfl: 4    potassium chloride (K-DUR,KLOR-CON) 20 mEq tablet, Take 1 tablet (20 mEq total) by mouth 2 (two) times a day, Disp: 180 tablet, Rfl: 3    rosuvastatin (CRESTOR) 20 MG tablet, Take 1 tablet (20 mg total) by mouth daily, Disp: 90 tablet, Rfl: 1    sildenafil (VIAGRA) 25 MG tablet, Take 1 tablet (25 mg total) by mouth daily as needed for erectile dysfunction, Disp: 10 tablet, Rfl: 0    SYNJARDY XR 12 5-1000 MG TB24, TK 1 T PO BID, Disp: , Rfl: 2    amLODIPine-benazepril (LOTREL) 5-40 MG per capsule, Take 1 capsule by mouth daily, Disp: 90 capsule, Rfl: 1    The following portions of the patient's history were reviewed and updated as appropriate: allergies, current medications, past family history, past medical history, past social history, past surgical history and problem list     Review of Systems   Constitutional: Positive for unexpected weight change (Weight gain)  HENT: Negative  Eyes: Negative  Respiratory: Negative  Cardiovascular: Negative  Negative for chest pain, palpitations and leg swelling  Gastrointestinal: Negative  Endocrine: Negative  Genitourinary: Negative      Musculoskeletal: Negative  Skin: Negative  Allergic/Immunologic: Negative  Neurological: Negative  Negative for headaches  Hematological: Negative  Psychiatric/Behavioral: Negative  All other systems reviewed and are negative  Objective:    /92   Pulse 82   Resp 18   Ht 5' 7" (1 702 m)   Wt 106 kg (233 lb 12 8 oz)   SpO2 97%   BMI 36 62 kg/m²      Physical Exam   Constitutional: He is oriented to person, place, and time  He appears well-developed and well-nourished  Obese   HENT:   Head: Normocephalic  Eyes: Pupils are equal, round, and reactive to light  Conjunctivae and EOM are normal    Neck: Normal range of motion  Neck supple  Cardiovascular: Normal rate, regular rhythm and normal heart sounds  Pulmonary/Chest: Effort normal and breath sounds normal    Abdominal: Soft  Bowel sounds are normal    Musculoskeletal: Normal range of motion  Neurological: He is alert and oriented to person, place, and time  Psychiatric: He has a normal mood and affect  His behavior is normal  Judgment and thought content normal    Nursing note and vitals reviewed          Recent Results (from the past 1008 hour(s))   Lipid panel    Collection Time: 09/23/19  7:16 AM   Result Value Ref Range    Total Cholesterol 141 <200 mg/dL    HDL 35 (L) >40 mg/dL    Triglycerides 78 <150 mg/dL    LDL Direct 89 mg/dL (calc)    Chol HDLC Ratio 4 0 <5 0 (calc)    Non-HDL Cholesterol 106 <130 mg/dL (calc)    Comment(s)     Comprehensive metabolic panel    Collection Time: 09/23/19  7:16 AM   Result Value Ref Range    Glucose, Random 96 65 - 99 mg/dL    BUN 19 7 - 25 mg/dL    Creatinine 1 00 0 70 - 1 33 mg/dL    eGFR Non  86 > OR = 60 mL/min/1 73m2    eGFR  100 > OR = 60 mL/min/1 73m2    SL AMB BUN/CREATININE RATIO NOT APPLICABLE 6 - 22 (calc)    Sodium 142 135 - 146 mmol/L    Potassium 4 6 3 5 - 5 3 mmol/L    Chloride 103 98 - 110 mmol/L    CO2 31 20 - 32 mmol/L    SL AMB CALCIUM 9 4 8 6 - 10 3 mg/dL    Protein, Total 6 4 6 1 - 8 1 g/dL    Albumin 4 4 3 6 - 5 1 g/dL    Globulin 2 0 1 9 - 3 7 g/dL (calc)    Albumin/Globulin Ratio 2 2 1 0 - 2 5 (calc)    TOTAL BILIRUBIN 0 5 0 2 - 1 2 mg/dL    Alkaline Phosphatase 80 40 - 115 U/L    AST 14 10 - 35 U/L    ALT 13 9 - 46 U/L   Microalbumin, Random Urine (W/Creatinine)    Collection Time: 09/23/19  7:16 AM   Result Value Ref Range    Creatinine, Urine 170 20 - 320 mg/dL    Microalbum  ,U,Random 7 9 See Note: mg/dL    Microalb/Creat Ratio 46 (H) <30 mcg/mg creat   CBC and differential    Collection Time: 09/23/19  7:16 AM   Result Value Ref Range    White Blood Cell Count 10 2 3 8 - 10 8 Thousand/uL    Red Blood Cell Count 5 78 4 20 - 5 80 Million/uL    Hemoglobin 16 3 13 2 - 17 1 g/dL    HCT 49 4 38 5 - 50 0 %    MCV 85 5 80 0 - 100 0 fL    MCH 28 2 27 0 - 33 0 pg    MCHC 33 0 32 0 - 36 0 g/dL    RDW 13 2 11 0 - 15 0 %    Platelet Count 868 293 - 400 Thousand/uL    SL AMB MPV 11 2 7 5 - 12 5 fL    Neutrophils (Absolute) 6,426 1,500 - 7,800 cells/uL    Lymphocytes (Absolute) 2,040 850 - 3,900 cells/uL    Monocytes (Absolute) 1,000 (H) 200 - 950 cells/uL    Eosinophils (Absolute) 622 (H) 15 - 500 cells/uL    Basophils  0 - 200 cells/uL    Neutrophils 63 %    Lymphocytes 20 0 %    Monocytes 9 8 %    Eosinophils 6 1 %    Basophils PCT 1 1 %    Always Message     PSA, Total Screen    Collection Time: 09/23/19  7:16 AM   Result Value Ref Range    Prostate Specific Antigen Total 1 0 < OR = 4 0 ng/mL   TSH, 3rd generation with Free T4 reflex    Collection Time: 09/23/19  7:16 AM   Result Value Ref Range    TSH W/RFX TO FREE T4 1 89 0 40 - 4 50 mIU/L   Hemoglobin A1c (w/out EAG)    Collection Time: 09/23/19  7:16 AM   Result Value Ref Range    Hemoglobin A1C 5 5 <5 7 % of total Hgb   Hemoglobin A1C    Collection Time: 09/30/19 12:00 AM   Result Value Ref Range    Hemoglobin A1C 5 5        Assessment/Plan:    Type 2 diabetes mellitus without complication, without long-term current use of insulin (HCC)    Lab Results   Component Value Date    HGBA1C 5 5 09/30/2019     Hemoglobin A1c of 5 5%  Follow up with endocrinologist as scheduled  Continue on insulin regimen as prescribed by endocrinologist    CAD (coronary artery disease)  Continue to manage risk factors including diabetes, hypertension, hyperlipidemia  Hypertension is not ideally controlled  Follow-up with Cardiology as scheduled  Weight loss would be beneficial     Hypertension  Not ideally controlled  Agree with placing patient on amlodipine however to minimize his pill burden will place him on combination amlodipine/benazepril  Patient will have blood pressure checked while at cardiologist office in 2 weeks    Persistent atrial fibrillation ([address] )  Seems to be in sinus rhythm at this time  Continue on Tikosyn and Xarelto    Drug-induced erectile dysfunction  Viagra did improve his erection  He does not need refill at this time  He will contact the office  Hyperlipidemia  Hyperlipidemia is fairly well controlled on Crestor 20 mg once daily  Continue same  Weight loss would be beneficial   Watch dietary intake of fats and cholesterol  Moderate obesity  Advised that weight loss would be beneficial    Acute idiopathic gout of left elbow  No significant swelling at this time of his left elbow  Check uric acid level  Increase hydration  Avoid alcohol  Cut down on his red meat  Do not start Uloric at this time         BMI Counseling: Body mass index is 36 62 kg/m²  The BMI is above normal  Exercise recommendations include exercising 3-5 times per week  I have spent 42 minutes with Patient  today in which greater than 50% of this time was spent in counseling/coordination of care regarding Diagnostic results, Prognosis, Risks and benefits of tx options, Intructions for management, Patient and family education, Importance of tx compliance, Risk factor reductions and Impressions        Problem List Items Addressed This Visit        Endocrine    Type 2 diabetes mellitus without complication, without long-term current use of insulin ([facility] [address] ) - Primary       Lab Results   Component Value Date    HGBA1C 5 5 09/30/2019     Hemoglobin A1c of 5 5%  Follow up with endocrinologist as scheduled  Continue on insulin regimen as prescribed by endocrinologist         Relevant Orders    Hemoglobin A1C    Microalbumin / creatinine urine ratio       Cardiovascular and Mediastinum    CAD (coronary artery disease)     Continue to manage risk factors including diabetes, hypertension, hyperlipidemia  Hypertension is not ideally controlled  Follow-up with Cardiology as scheduled  Weight loss would be beneficial          Relevant Medications    apixaban (ELIQUIS) 5 mg    Other Relevant Orders    CBC and differential    Comprehensive metabolic panel    Hypertension     Not ideally controlled  Agree with placing patient on amlodipine however to minimize his pill burden will place him on combination amlodipine/benazepril  Patient will have blood pressure checked while at cardiologist office in 2 weeks         Relevant Medications    amLODIPine-benazepril (LOTREL) 5-40 MG per capsule    Persistent atrial fibrillation     Seems to be in sinus rhythm at this time  Continue on Tikosyn and Xarelto         Relevant Medications    potassium chloride (K-DUR,KLOR-CON) 20 mEq tablet    Other Relevant Orders    TSH, 3rd generation with Free T4 reflex       Genitourinary    Drug-induced erectile dysfunction     Viagra did improve his erection  He does not need refill at this time  He will contact the office  Other    Acute idiopathic gout of left elbow     No significant swelling at this time of his left elbow  Check uric acid level  Increase hydration  Avoid alcohol  Cut down on his red meat    Do not start Uloric at this time         Relevant Orders    Uric acid    Hyperlipidemia     Hyperlipidemia is fairly well controlled on Crestor 20 mg once daily  Continue same  Weight loss would be beneficial   Watch dietary intake of fats and cholesterol           Relevant Medications    rosuvastatin (CRESTOR) 20 MG tablet    Other Relevant Orders    Comprehensive metabolic panel    Lipid panel    Moderate obesity     Advised that weight loss would be beneficial           Other Visit Diagnoses     New onset atrial fibrillation (HCC)        Relevant Medications    apixaban (ELIQUIS) 5 mg    Flu vaccine need        Relevant Orders    influenza vaccine, [id number], quadrivalent, recombinant, PF, 0 5 mL, for patients 18 yr+ (FLUBLOK) (Completed)

## 2019-10-23 ENCOUNTER — OFFICE VISIT (OUTPATIENT)
Dept: CARDIOLOGY CLINIC | Facility: CLINIC | Age: 54
End: 2019-10-23
Payer: COMMERCIAL

## 2019-10-23 VITALS
WEIGHT: 233 LBS | HEART RATE: 70 BPM | DIASTOLIC BLOOD PRESSURE: 88 MMHG | BODY MASS INDEX: 36.57 KG/M2 | SYSTOLIC BLOOD PRESSURE: 138 MMHG | HEIGHT: 67 IN

## 2019-10-23 DIAGNOSIS — I25.119 CORONARY ARTERY DISEASE INVOLVING NATIVE CORONARY ARTERY OF NATIVE HEART WITH ANGINA PECTORIS (HCC): ICD-10-CM

## 2019-10-23 DIAGNOSIS — R00.0 INAPPROPRIATE SINUS TACHYCARDIA: ICD-10-CM

## 2019-10-23 DIAGNOSIS — I48.19 PERSISTENT ATRIAL FIBRILLATION (HCC): ICD-10-CM

## 2019-10-23 DIAGNOSIS — I10 ESSENTIAL HYPERTENSION: Primary | ICD-10-CM

## 2019-10-23 DIAGNOSIS — I48.91 NEW ONSET ATRIAL FIBRILLATION (HCC): ICD-10-CM

## 2019-10-23 PROCEDURE — 3075F SYST BP GE 130 - 139MM HG: CPT | Performed by: PHYSICIAN ASSISTANT

## 2019-10-23 PROCEDURE — 93000 ELECTROCARDIOGRAM COMPLETE: CPT | Performed by: PHYSICIAN ASSISTANT

## 2019-10-23 PROCEDURE — 3079F DIAST BP 80-89 MM HG: CPT | Performed by: PHYSICIAN ASSISTANT

## 2019-10-23 PROCEDURE — 99214 OFFICE O/P EST MOD 30 MIN: CPT | Performed by: PHYSICIAN ASSISTANT

## 2019-10-23 RX ORDER — SPIRONOLACTONE 25 MG/1
25 TABLET ORAL DAILY
Qty: 30 TABLET | Refills: 11 | Status: SHIPPED | OUTPATIENT
Start: 2019-10-23 | End: 2020-03-25 | Stop reason: SDUPTHER

## 2019-10-23 NOTE — PROGRESS NOTES
Electrophysiology Office Note    [facility] [place]  1965  [id number]  HEART & VASCULAR [facility] [place]  [address]        Assessment/Plan   1  Persistent atrial fibrillation, asymptomatic               * patient is currently in NSR! W/ a HR of 69bpm              * his CV is 4 (T2DM, HTN, CAD, NICMP) and he is on appropriate AC w/ eliquis 5mg PO BID               * he did undergo ELZBIETA w/ DCCV in October 2018 and is now s/p cryo PVI w/ tikosyn loading w/ need for 2nd DCCV on 2-4-19 due to returning to atrial fibrillation  * EF did improve from 30% to 45% in 4-2019   * plan to continue current medical regimen    * modifiable RF for AF     1  Alcohol intake - does not consume alcohol     2  NORA - compliant with CPAP     3  Weight loss - stable at 233lbs, educated patient on continued weight loss needs     4  HTN - BP uncontrolled, see below     5  T2DM - controlled, last A1C 5 5     2  CAD s/p PCI to LAD (2015)   3  HTN    * patient's BP in outpatient offices and recheck here today have been elevated    * given his EF of 45% will continue combination of amlodipine-benazapril, toprol XL and will add aldactone 25mg PO QDay to medical regimen    * due to this addition will stop his daily klor con and have him check a BMP in 1 week for potassium evaluation    * educated patient on proper recordings of BP at home, he will call me in 2 weeks with his BP recordings, we can adjust medications from there     4  T2DM   * A1C in 9-2019 was 5 5     5  NORA on CPAP   6  Nonischemic cardiomyopathy              * EF in 4-2019 increased to 45%! * he is on ACEI and BB now aldactone     Rhythm History:   Atrial fibrillation:   1  DCCV -    2  atrial fibrillation cryo PVI w/ tikosyn loading - 1-2019 by Dr Fito White  3  DCCV - 2-4-19    Atrial flutter:     SVT:     VT/VF:       Cardiac Testing:     ECHO: No results found for this or any previous visit      CATH/STRESS TEST (2018):  SUMMARY:  -  Stress results: Duration of exercise was 6 min and 1 sec  Target heart rate was achieved  There was resting hypertension with a hypertensive blood pressure response to stress  There was no chest pain during stress  -  ECG conclusions: The stress ECG was equivocal for ischemia  The stress ECG was non-diagnostic due to baseline left bundle branch block  -  Perfusion imaging: There was a small, mildly severe, predominantly fixed myocardial perfusion defect of the apical wall and basal inferior walls, slightly worse on rest images, likely due to artifact   -  Gated SPECT: The calculated left ventricular ejection fraction was 32 %  There was diffuse moderately reduced myocardial thickening and motion      IMPRESSIONS: Abnormal study after maximal exercise  There was image artifact, without diagnostic evidence for perfusion abnormality  Left ventricular systolic function was reduced, without distinct regional wall motion abnormalities  EKG (10-23-19):   Normal sinus rhythm   LAFB QRS 120ms   QTC 486ms        History of Present Illness     HPI/INTERVAL HISTORY: Gigi Espinal is a 48 y o  male with a history of obesity, persistent atrial fibrillation AC w/ eliquis s/p DCCV (, 2-4-19) s/p cryo PVI and tikosyn loading , NICMP EF 30% (1-2019), HTN, T2DM, HLD, NORA on CPAP, CAD s/p PCI to LAD (2015) who presents to the EP office today for f/u regarding atrial fibrillation management  He is normally a patient of Dr Machelle Avelar  12-13-18:   Patient was referred to the EP office by his primary Cardiologist Dr Hany Hale who he has been following with for 6 years  In September 2017 patient was found to be in NSR on routine follow up but in his routine follow up in September 2018 ECG demonstrated new onset atrial fibrillation w/ CVR  Patient denied having any symptoms of LH, dizziness, fatigue, palpitations or SOB   He works as a  for [employer] and has been able to carry out his work without any noted limitations  Patient denies drinking ANY alcohol but does have a diagnosis of NORA for which he is moderately compliant with his CPAP  Due to cervical neck issues which are being treated he uses his CPAP till about 4AM each night needing to take it off due to pain in his neck  Due to his continued atrial fibrillation Dr Claudia Damon had patient undergo ELZBIETA w/ DCCV on 10-24-18 which revealed an EF of 40% with mild to mod LA size  Patient was successfully cardioverted to NSR w/ referral to our office for further management  After his cardioversion patient denies having any improvement in functional capacity  He has never had any cardiac surgeries in the past, no hx of MAZE, CABG or valve replacements  He has lost 40lbs this year and is excited to lose more weight       3/5/19:   Since last seen patient did undergo atrial fibrillation ablation w/ tikosyn loading on 1-22-19 w/ also being started on corlenor due to his inappropriate sinus tachycardia  Unfortunately shortly after returning home patient became symptomatic and was again found to be in atrial fibrillation prompting DCCV on 2-4-19 which was successful for restoration of NSR  On 2-7-19 he was evaluated by Ravi Rob PA-C and Dr Dante Shields where he was found to be in NSR w/ plans for 1 month f/u to determine if he will need a second atrial fibrillation ablation  Of note last echo performed in Jan 2019 showed a declining EF of 30%  Today patient has no major concerns or complaints and feels extremely well  He is back to working full duty as a  and 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 at the end of March  He is looking forward to golfing throughout the spring/summer and continues to lose weight and is down 5 lb since his hospital stay  Denies any palpitations, lightheadedness, dizziness, shortness of breath or chest discomfort  10-23-19:  Since patient's last office visit he has had no concerns or complaints  He has been applying for baseball throughout the summer and 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  He is able to perform these tasks without any limitations  He denies any palpitations, lightheadedness, dizziness or shortness of breath  He has noted at his office appointment his blood pressure has been elevated up in the 150s over mid to high 80s  His endocrinologist did put him on a combination of benazepril and amlodipine, prior to this he has been taking Toprol  mg twice daily with benazepril 40 mg once daily  Review of Systems  ROS as noted above, otherwise 12 point review of systems was performed and is negative         Historical Information   Social History     Socioeconomic History    Marital status: /Civil Union     Spouse name: Not on file    Number of children: Not on file    Years of education: Not on file    Highest education level: Not on file   Occupational History    Not on file   Social Needs    Financial resource strain: Not on file    Food insecurity:     Worry: Not on file     Inability: Not on file    Transportation needs:     Medical: Not on file     Non-medical: Not on file   Tobacco Use    Smoking status: Never Smoker    Smokeless tobacco: Former User   Substance and Sexual Activity    Alcohol use: No    Drug use: No    Sexual activity: Not on file   Lifestyle    Physical activity:     Days per week: Not on file     Minutes per session: Not on file    Stress: Not on file   Relationships    Social connections:     Talks on phone: Not on file     Gets together: Not on file     Attends Restoration service: Not on file     Active member of club or organization: Not on file     Attends meetings of clubs or organizations: Not on file     Relationship status: Not on file    Intimate partner violence:     Fear of current or ex partner: Not on file     Emotionally abused: Not on file     Physically abused: Not on file     Forced sexual activity: Not on file   Other Topics Concern    Not on file   Social History Narrative    Not on file     Past Medical History:   Diagnosis Date    CAD (coronary artery disease)     Diabetes ([address] )     Dizziness     Erectile dysfunction     GERD (gastroesophageal reflux disease)     Hyperlipidemia     Hypertension     Shortness of breath      Past Surgical History:   Procedure Laterality Date    CARDIAC CATHETERIZATION      ELECTRICAL CARDIOVERSION  2/4/2019          Social History     Substance and Sexual Activity   Alcohol Use No     Social History     Substance and Sexual Activity   Drug Use No     Social History     Tobacco Use   Smoking Status Never Smoker   Smokeless Tobacco Former User     Family History   Problem Relation Age of Onset    Coronary artery disease Family     Diabetes Family     No Known Problems Father        Meds/Allergies       Current Outpatient Medications:     amLODIPine-benazepril (LOTREL) 5-40 MG per capsule, Take 1 capsule by mouth daily, Disp: 90 capsule, Rfl: 1    apixaban (ELIQUIS) 5 mg, Take 1 tablet (5 mg total) by mouth 2 (two) times a day, Disp: 180 tablet, Rfl: 3    aspirin 81 mg chewable tablet, Chew 1 tablet (81 mg total) daily, Disp: 100 tablet, Rfl: 1    cholecalciferol (VITAMIN D3) 1,000 units tablet, Take by mouth daily, Disp: , Rfl:     co-enzyme Q-10 30 MG capsule, Take 30 mg by mouth daily  , Disp: , Rfl:     dofetilide (TIKOSYN) 500 mcg capsule, Take 1 capsule (500 mcg total) by mouth every 12 (twelve) hours, Disp: 60 capsule, Rfl: 11    ivabradine HCl (CORLANOR) 5 MG tablet, Take 2 tablets (10 mg total) by mouth 2 (two) times a day, Disp: 360 tablet, Rfl: 3    metoprolol succinate (TOPROL-XL) 100 mg 24 hr tablet, Take 1 5 tablets (150 mg total) by mouth 2 (two) times a day, Disp: 270 tablet, Rfl: 2    ONETOUCH DELICA LANCETS 48S MISC, CHECK BID, Disp: , Rfl: 1    ONETOUCH VERIO test strip, CHECK TWICE DAILY, Disp: , Rfl: 3    OZEMPIC 1 MG/DOSE SOPN, INJECT 1 MG SQ Q WEEK, Disp: , Rfl: 4    potassium chloride (K-DUR,KLOR-CON) 20 mEq tablet, Take 1 tablet (20 mEq total) by mouth 2 (two) times a day, Disp: 180 tablet, Rfl: 3    rosuvastatin (CRESTOR) 20 MG tablet, Take 1 tablet (20 mg total) by mouth daily, Disp: 90 tablet, Rfl: 1    sildenafil (VIAGRA) 25 MG tablet, Take 1 tablet (25 mg total) by mouth daily as needed for erectile dysfunction, Disp: 10 tablet, Rfl: 0    SYNJARDY XR 12 5-1000 MG TB24, TK 1 T PO BID, Disp: , Rfl: 2    Allergies   Allergen Reactions    Dye [Iodinated Diagnostic Agents] Hives and GI Intolerance     Definity - name of contrast         Objective   Vitals: Blood pressure 138/88, pulse 70, height 5' 7" (1 702 m), weight 106 kg (233 lb)  Physical Exam   Constitutional: He is oriented to person, place, and time  He appears well-developed and well-nourished  HENT:   Head: Normocephalic and atraumatic  Eyes: Pupils are equal, round, and reactive to light  EOM are normal    Neck: Normal range of motion  Neck supple  Cardiovascular: Normal rate and regular rhythm  Pulmonary/Chest: Effort normal and breath sounds normal    Abdominal: Soft  Bowel sounds are normal    Musculoskeletal: Normal range of motion  Neurological: He is alert and oriented to person, place, and time  Skin: Skin is warm and dry  Psychiatric: He has a normal mood and affect  Labs:not applicable    Imaging: I have personally reviewed pertinent reports

## 2019-10-23 NOTE — PATIENT INSTRUCTIONS
1  Start taking spironolactone (aldactone) 25mg once daily for your blood pressure     2  Take your blood pressure daily at home, record readings, call me with results in 2 weeks, [phone]     3  Check kidney blood work in 1 week due to new start on aldactone     4   Stop taking potassium chloride tablets as aldactone can make potassium levels increase

## 2020-02-23 DIAGNOSIS — I48.19 PERSISTENT ATRIAL FIBRILLATION (HCC): ICD-10-CM

## 2020-02-25 RX ORDER — METOPROLOL SUCCINATE 100 MG/1
TABLET, EXTENDED RELEASE ORAL
Qty: 270 TABLET | Refills: 2 | Status: SHIPPED | OUTPATIENT
Start: 2020-02-25 | End: 2020-10-30 | Stop reason: SDUPTHER

## 2020-03-20 DIAGNOSIS — I10 ESSENTIAL HYPERTENSION: ICD-10-CM

## 2020-03-21 RX ORDER — AMLODIPINE BESYLATE AND BENAZEPRIL HYDROCHLORIDE 5; 40 MG/1; MG/1
1 CAPSULE ORAL DAILY
Qty: 90 CAPSULE | Refills: 1 | Status: SHIPPED | OUTPATIENT
Start: 2020-03-21 | End: 2020-09-04 | Stop reason: SDUPTHER

## 2020-03-25 ENCOUNTER — TELEMEDICINE (OUTPATIENT)
Dept: CARDIOLOGY CLINIC | Facility: CLINIC | Age: 55
End: 2020-03-25
Payer: COMMERCIAL

## 2020-03-25 VITALS
HEART RATE: 77 BPM | DIASTOLIC BLOOD PRESSURE: 100 MMHG | SYSTOLIC BLOOD PRESSURE: 152 MMHG | WEIGHT: 232 LBS | HEIGHT: 67 IN | BODY MASS INDEX: 36.41 KG/M2

## 2020-03-25 DIAGNOSIS — Z99.89 OBSTRUCTIVE SLEEP APNEA ON CPAP: ICD-10-CM

## 2020-03-25 DIAGNOSIS — I48.19 PERSISTENT ATRIAL FIBRILLATION (HCC): Primary | ICD-10-CM

## 2020-03-25 DIAGNOSIS — E66.8 MODERATE OBESITY: ICD-10-CM

## 2020-03-25 DIAGNOSIS — E78.2 MIXED HYPERLIPIDEMIA: ICD-10-CM

## 2020-03-25 DIAGNOSIS — I10 ESSENTIAL HYPERTENSION: ICD-10-CM

## 2020-03-25 DIAGNOSIS — I25.119 CORONARY ARTERY DISEASE INVOLVING NATIVE CORONARY ARTERY OF NATIVE HEART WITH ANGINA PECTORIS (HCC): ICD-10-CM

## 2020-03-25 DIAGNOSIS — G47.33 OBSTRUCTIVE SLEEP APNEA ON CPAP: ICD-10-CM

## 2020-03-25 PROCEDURE — 3077F SYST BP >= 140 MM HG: CPT | Performed by: PHYSICIAN ASSISTANT

## 2020-03-25 PROCEDURE — 3008F BODY MASS INDEX DOCD: CPT | Performed by: PHYSICIAN ASSISTANT

## 2020-03-25 PROCEDURE — 3080F DIAST BP >= 90 MM HG: CPT | Performed by: PHYSICIAN ASSISTANT

## 2020-03-25 PROCEDURE — 99213 OFFICE O/P EST LOW 20 MIN: CPT | Performed by: PHYSICIAN ASSISTANT

## 2020-03-25 RX ORDER — SPIRONOLACTONE 50 MG/1
50 TABLET, FILM COATED ORAL DAILY
Qty: 90 TABLET | Refills: 2 | Status: SHIPPED | OUTPATIENT
Start: 2020-03-25 | End: 2020-12-01 | Stop reason: SDUPTHER

## 2020-03-25 NOTE — PATIENT INSTRUCTIONS
Please increase your spironolactone to 50 mg daily, and check blood work as soon as able  Continue to monitor your blood pressure and heart rate, and send those results in to the office periodically for ongoing monitoring  Please call the office when the current situation improves so that you can safely come in to the office for an ECG for ongoing dofetilide monitoring

## 2020-03-25 NOTE — PROGRESS NOTES
Electrophysiology Follow Up - Virtual Regular Visit    HEART & VASCULAR CENTER   [facility]  [address] [facility] [address]      Zandra Garcia  1965  [id number]    Date of Visit: 3/25/2020      Problem List Items Addressed This Visit        Respiratory    Obstructive sleep apnea on CPAP       Cardiovascular and Mediastinum    Persistent atrial fibrillation - Primary    CAD (coronary artery disease)    Hypertension    Relevant Medications    spironolactone (ALDACTONE) 50 mg tablet    Other Relevant Orders    Basic metabolic panel       Other    Hyperlipidemia    Moderate obesity               Reason for visit is 6 month follow up    Encounter provider Tabby Miranda PA-C    Provider located at [place] [id number]  [address] [facility] [address]      Recent Visits  No visits were found meeting these conditions  Showing recent visits within past 7 days and meeting all other requirements     Future Appointments  No visits were found meeting these conditions  Showing future appointments within next 150 days and meeting all other requirements        After connecting through [employer], the patient was identified by name and date of birth  Stephanie Jeter was informed that this is a telemedicine visit and that the visit is being conducted through telephone which may not be secure and therefore, might not be HIPAA-compliant  My office door was closed  No one else was in the room  He acknowledged consent and understanding of privacy and security of the video platform  The patient has agreed to participate and understands they can discontinue the visit at any time  *Please note that [employer] Teams was attempted, however patient could not connect to the visit        Assessment/Plan     1  Persistent atrial fibrillation     2  Essential hypertension  spironolactone (ALDACTONE) 50 mg tablet    Basic metabolic panel   3   Coronary artery disease involving native coronary artery of native heart with angina pectoris ([address] )     4  Obstructive sleep apnea on CPAP     5  Mixed hyperlipidemia     6  Moderate obesity         ASSESSMENT:  1  Persistent atrial fibrillation status post cryoablation with pulmonary vein isolation and typical atrial flutter line 1/2019               A ) maintaining sinus rhythm on dofetilide antiarrhythmic therapy, which was started at the time of his ablation   B ) post ablation recurrent atrial fibrillation while undergoing dofetilide initiation, status post cardioversion 1/2019, with recurrent atrial fibrillation 2/2019 requiring a second cardioversion at that time   C ) CHADS2 Vasc = 4, maintained on Eliquis              D ) prior failed cardioversion 10/2018  2  Nonischemic cardiomyopathy with LVEF 45% per limited echo 4/2019   A ) as low as 30% per echo 1/2019, suspect an element of tachy-mediated              B ) nonischemic nuclear stress test 9/2018  3  CAD status post prior PCI to LAD in 2015  4  Sinus tachycardia maintained on Corlanor  5  Hypertension  6  Hyperlipidemia  7  Obesity with BMI 36  8  Diabetes mellitus type 2  9  Obstructive sleep apnea, maintained on CPAP      DISCUSSION/SUMMARY:  1  Persistent atrial fibrillation with prior cryoablation and typical flutter line 01/2019 - historically, he has been asymptomatic with his arrhythmias and thus without an EKG today it is unclear whether he is truly in normal sinus rhythm  His most recent office visits have demonstrated normal sinus, and based on vital signs through home monitoring his pulse has largely been in the 70s  He is maintained on dofetilide antiarrhythmic therapy, which he is tolerating well  His most recent creatinine from 09/2019 was 1 0, and QTC per EKG 10/2019 was 486 milliseconds  For now, he will continue his current regimen including Eliquis anticoagulation    I asked him to contact our office after the pandemic so that he can safely have an EKG done for ongoing evaluation of his QTC  As he is asymptomatic with his atrial fibrillation, he would likely benefit from loop recorder implantation for ongoing management of his atrial fibrillation  This can be discussed at future in-office appointments, especially if any changes in his medications are being considered  2  Nonischemic cardiomyopathy with LVEF 45% - he denies signs or symptoms consistent with congestive heart failure  His EF was as low as 30%, and has improved with optimal medical therapy (benazepril, Toprol-XL, spironolactone)  He will continue this regimen, and will continue to monitor for changes in symptoms  3  CAD with prior PCI to LAD - he is maintained on aspirin, ACEi, beta blocker, and statin therapy  He reports occasional, brief, sporadic "sticking" pain in his central chest without radiation, it is not related to exertion and occurs while he is sitting  I explained that this is very atypical and does not sound like an anginal equivalent  He denies all exertional symptoms  He had a prior nonischemic stress test 9/2018, and no further testing is necessary at this time  4  Hypertension - per recent at home readings, this does not appear to be controlled (see values in vital signs below)  Recommend increasing spironolactone to 50 mg daily  He will keep tracking his blood pressures at home, and he knows to call us or his PCP if it remains elevated  I asked him to check a BMP at his earliest convenience for monitoring of renal function and electrolytes with this change  5  Hyperlipidemia - most recent lipid panel from 09/2019 showed total cholesterol 141, triglycerides 78, LDL 89, HDL 35  He is maintained on Crestor, which he is tolerating well  LFTs at that time were also within limits  He will continue this regimen, and this is primarily managed by his PCP  6  Obstructive sleep apnea - he is compliant with CPAP, and I encouraged him to continue this    We again reviewed risk factors for recurrent atrial fibrillation, including weight, sleep apnea, hypertension, diabetes, and alcohol intake  7  Obesity with BMI 36 - over the past 1 5 years, he has lost almost 40 lbs and I congratulated him on this  He attributes it largely to his diabetes medications  I encouraged him to continue with routine exercise and healthy diet  8   Baseline sinus tachycardia - this was noticed in the post ablation setting, and he was started on Corlanor  He has been tolerating this medication well  Per at home vital signs, his heart rate has largely been in the 70s  For now, he will continue Corlanor with no changes made  He will continue to follow up every 6 months, but I did ask him to call the office when he can safely come in for a quick EKG for ongoing QTC monitoring while on dofetilide  I will also await the results of blood work, and that script will be mailed to the patient  He knows to contact us in the meantime with any questions, concerns, or changes in symptoms  History of Present Illness     CHIEF COMPLAINT: 6 month follow up, atypical chest pain    HPI/INTERVAL HISTORY: Gigi Espinal is a 48 y o  male with persistent atrial fibrillation, nonischemic cardiomyopathy with most recent LVEF 45% per limited echo 04/2019, hypertension, hyperlipidemia, diabetes, obstructive sleep apnea, CAD with prior PCI to LAD, and obesity with BMI 36  He typically follows with Dr Machelle Avelar, is being seen today in routine follow-up  Historically, he has followed with Dr Hany Hale and was found to be in atrial fibrillation 09/2018  Per reports, the patient was largely asymptomatic with this  He underwent ELZBIETA/cardioversion 10/2018, and ELZBIETA at that time showed reduced LVEF 40%  While he was successfully cardioverted, he then reverted back to atrial fibrillation  He underwent a nuclear stress test which showed no evidence of ischemia   He was thus seen in EP consultation 12/2018, at which time an atrial fibrillation ablation with antiarrhythmic initiation was recommended  In 01/2019 he underwent cryoablation with pulmonary vein isolation and typical flutter line  He was started on dofetilide in the post ablation setting  Unfortunately, during that admission he reverted back to atrial fibrillation and required cardioversion prior to discharge  An echocardiogram performed during that admission showed further reduction in LVEF 30%  When he then returned for a one-week EKG follow-up 02/2019, he was again found to be in atrial fibrillation and required another cardioversion  Since then, he seems to have been maintaining normal sinus rhythm  A repeat limited echo 04/2019 showed improvement in LVEF to 45%  He has been maintained on optimal medical therapy with Ace inhibitor, beta-blocker, and spironolactone was added at most recent office visit 10/2019 for ongoing elevated blood pressure  For the most part, he has been feeling very well  He admits to occasional sticking or poking sensation in his central chest area without radiation  There are no clear triggers for this discomfort, and it happens randomly and infrequently  There are no exertional components to it, as he remains active without symptoms or limitations  He typically lasts 1 minutes, and resolved on its own  He mainly feels that when he is sitting and resting  Otherwise, he denies exertional chest pain, dyspnea at rest or with exertion, palpitations, dizziness, lightheadedness, presyncope, syncope, or issues with edema/ weight gain  He actually reports losing 40 lb over the past 1 5 years  He is compliant with his CPAP as much as possible, and I encouraged him to continue using this  No other acute complaints today  Review of Systems   Constitutional: Negative for fatigue and unexpected weight change  HENT: Negative  Eyes: Negative      Respiratory: Negative for chest tightness, shortness of breath and wheezing  Cardiovascular: Positive for chest pain  Negative for palpitations and leg swelling  Gastrointestinal: Negative  Endocrine: Negative  Genitourinary: Negative  Musculoskeletal: Negative  Skin: Negative  Allergic/Immunologic: Negative  Neurological: Negative for dizziness, syncope, weakness and light-headedness  Hematological: Negative  Psychiatric/Behavioral: Negative  All other systems reviewed and are negative  ROS as noted above, otherwise 12 point review of systems was performed and is negative  Historical Information  - I have personally reviewed and updated this information, including social history, medical history, and family history    Social History     Socioeconomic History    Marital status: /Civil Union     Spouse name: Not on file    Number of children: Not on file    Years of education: Not on file    Highest education level: Not on file   Occupational History    Not on file   Social Needs    Financial resource strain: Not on file    Food insecurity:     Worry: Not on file     Inability: Not on file    Transportation needs:     Medical: Not on file     Non-medical: Not on file   Tobacco Use    Smoking status: Never Smoker    Smokeless tobacco: Former User   Substance and Sexual Activity    Alcohol use: No    Drug use: No    Sexual activity: Not on file   Lifestyle    Physical activity:     Days per week: Not on file     Minutes per session: Not on file    Stress: Not on file   Relationships    Social connections:     Talks on phone: Not on file     Gets together: Not on file     Attends [employer]ism service: Not on file     Active member of club or organization: Not on file     Attends meetings of clubs or organizations: Not on file     Relationship status: Not on file    Intimate partner violence:     Fear of current or ex partner: Not on file     Emotionally abused: Not on file     Physically abused: Not on file     Forced sexual activity: Not on file   Other Topics Concern    Not on file   Social History Narrative    Not on file     Past Medical History:   Diagnosis Date    CAD (coronary artery disease)     Diabetes ([address] )     Dizziness     Erectile dysfunction     GERD (gastroesophageal reflux disease)     Hyperlipidemia     Hypertension     Shortness of breath      Past Surgical History:   Procedure Laterality Date    CARDIAC CATHETERIZATION      ELECTRICAL CARDIOVERSION  2/4/2019          Social History     Substance and Sexual Activity   Alcohol Use No     Social History     Substance and Sexual Activity   Drug Use No     Social History     Tobacco Use   Smoking Status Never Smoker   Smokeless Tobacco Former User     Family History   Problem Relation Age of Onset    Coronary artery disease Family     Diabetes Family     No Known Problems Father        Meds/Allergies       Current Outpatient Medications:     amLODIPine-benazepril (LOTREL) 5-40 MG per capsule, Take 1 capsule by mouth daily, Disp: 90 capsule, Rfl: 1    apixaban (ELIQUIS) 5 mg, Take 1 tablet (5 mg total) by mouth 2 (two) times a day, Disp: 180 tablet, Rfl: 3    aspirin 81 mg chewable tablet, Chew 1 tablet (81 mg total) daily, Disp: 100 tablet, Rfl: 1    cholecalciferol (VITAMIN D3) 1,000 units tablet, Take by mouth daily, Disp: , Rfl:     co-enzyme Q-10 30 MG capsule, Take 30 mg by mouth daily  , Disp: , Rfl:     dofetilide (TIKOSYN) 500 mcg capsule, Take 1 capsule (500 mcg total) by mouth every 12 (twelve) hours, Disp: 60 capsule, Rfl: 11    ivabradine HCl (CORLANOR) 5 MG tablet, Take 2 tablets (10 mg total) by mouth 2 (two) times a day, Disp: 360 tablet, Rfl: 3    metoprolol succinate (TOPROL-XL) 100 mg 24 hr tablet, TAKE 1 AND 1/2 TABLETS(150 MG) BY MOUTH TWICE DAILY, Disp: 270 tablet, Rfl: 2    ONETOUCH DELICA LANCETS 82M MISC, CHECK BID, Disp: , Rfl: 1    ONETOUCH VERIO test strip, CHECK TWICE DAILY, Disp: , Rfl: 3    OZEMPIC 1 MG/DOSE SOPN, INJECT 1 MG SQ Q WEEK, Disp: , Rfl: 4    rosuvastatin (CRESTOR) 20 MG tablet, Take 1 tablet (20 mg total) by mouth daily, Disp: 90 tablet, Rfl: 1    sildenafil (VIAGRA) 25 MG tablet, Take 1 tablet (25 mg total) by mouth daily as needed for erectile dysfunction, Disp: 10 tablet, Rfl: 0    spironolactone (ALDACTONE) 50 mg tablet, Take 1 tablet (50 mg total) by mouth daily, Disp: 90 tablet, Rfl: 2    SYNJARDY XR 12 5-1000 MG TB24, TK 1 T PO BID, Disp: , Rfl: 2    Allergies   Allergen Reactions    Dye [Iodinated Diagnostic Agents] Hives and GI Intolerance     Definity - name of contrast         Objective   Vitals: Blood pressure 152/100, pulse 77, height 5' 7" (1 702 m), weight 105 kg (232 lb)  Recent vital signs:  2020 - 134/87, pulse 85  2020 - 152/100, pulse 77   2020 - 150/98, pulse 76   3/14/2020 - 140/93, pulse 75   3/22/2020 - 137/93, pulse 70      Physical Exam   Vitals reviewed  Not completed, as this visit was conducted over the phone (Teams was attempted however patient could not connect)  Labs:  Orders Only on 2019   Component Date Value    EXT Creatinine Urine 2019 144     Microalbum  ,U,Random 2019 10 3     EXTERNAL Microalb/Creat * 2019 72    Orders Only on 2019   Component Date Value    Hemoglobin A1C 2019 5 5        Imaging: I have personally reviewed pertinent reports        LIMITED ECHO 2019: Study date:  2019     Patient: Kelly Blair  MR number: [id number]  Account number: [de-identified]  : 1965  Age: 48 years  Gender: Male  Status: Outpatient  Location: [facility]  Height: 67 in  Weight: 226 6 lb  BP: 142/ 90 mmHg     Indications: Assess left ventricular function      Diagnoses: I42 9 - Cardiomyopathy, unspecified     Sonographer:  ALEX Mcintyre  Primary Physician:  Candy Newsome DO  Referring Physician:  Sabas Perdue PA-C  Group:  [facility]  Interpreting Physician:  Natalie Herrera MD     SUMMARY     LEFT VENTRICLE:  Systolic function was mildly reduced by visual assessment  Ejection fraction was estimated to be 45 %  There were no regional wall motion abnormalities  There was mild diffuse hypokinesis  Wall thickness was mildly increased  There was mild concentric hypertrophy      RIGHT VENTRICLE:  The size was normal   Systolic function was normal      LEFT ATRIUM:  The atrium was moderately dilated      HISTORY: PRIOR HISTORY: Cardiomyopathy, Afib s/p cardioversion, s/p ablation, Hypertension, Hyperlipidemia, DM2, NORA     PROCEDURE: The study was performed in the [facility]  This was a routine study  The transthoracic approach was used  The study included limited 2D imaging, M-mode, limited spectral Doppler, and color Doppler  The  heart rate was 81 bpm, at the start of the study  Image quality was adequate      LEFT VENTRICLE: Size was normal  Systolic function was mildly reduced by visual assessment  Ejection fraction was estimated to be 45 %  There were no regional wall motion abnormalities  There was mild diffuse hypokinesis  Wall thickness  was mildly increased  There was mild concentric hypertrophy  DOPPLER: Left ventricular diastolic function parameters were normal      RIGHT VENTRICLE: The size was normal  Systolic function was normal  Wall thickness was normal      LEFT ATRIUM: The atrium was moderately dilated      RIGHT ATRIUM: Size was normal      MITRAL VALVE: Valve structure was normal  There was normal leaflet separation  DOPPLER: The transmitral velocity was within the normal range  There was no evidence for stenosis  There was no significant regurgitation      AORTIC VALVE: The valve was trileaflet  Leaflets exhibited normal thickness and normal cuspal separation  DOPPLER: Transaortic velocity was within the normal range  There was no evidence for stenosis   There was no significant  regurgitation      TRICUSPID VALVE: The valve structure was normal  There was normal leaflet separation  DOPPLER: The transtricuspid velocity was within the normal range  There was no evidence for stenosis  There was no significant regurgitation      PULMONIC VALVE: Leaflets exhibited normal thickness, no calcification, and normal cuspal separation  DOPPLER: The transpulmonic velocity was within the normal range  There was no significant regurgitation      PERICARDIUM: There was no pericardial effusion  The pericardium was normal in appearance      AORTA: The root exhibited normal size      SYSTEMIC VEINS: IVC: The inferior vena cava was normal in size  Respirophasic changes were normal      SYSTEM MEASUREMENT TABLES     2D  %FS: 32 68 %  AV Diam: 3 07 cm  EDV(Teich): 165 64 ml  EF Biplane: 62 75 %  EF(Cube): 69 49 %  EF(Teich): 60 33 %  ESV(Cube): 59 1 ml  ESV(Teich): 65 71 ml  IVSd: 1 27 cm  LA Diam: 4 97 cm  LVEDV MOD A2C: 148 35 ml  LVEDV MOD A4C: 133 73 ml  LVEDV MOD BP: 141 84 ml  LVEF MOD A2C: 67 72 %  LVEF MOD A4C: 59 27 %  LVESV MOD A2C: 47 89 ml  LVESV MOD A4C: 54 46 ml  LVESV MOD BP: 52 83 ml  LVIDd: 5 79 cm  LVIDs: 3 9 cm  LVLd A2C: 8 41 cm  LVLd A4C: 8 54 cm  LVLs A2C: 6 71 cm  LVLs A4C: 7 26 cm  LVPWd: 1 29 cm  SI(Cube): 62 9 ml/m2  SI(Teich): 46 7 ml/m2  SV MOD A2C: 100 45 ml  SV MOD A4C: 79 27 ml  SV(Cube): 134 61 ml  SV(Teich): 99 93 ml     PW  AVC: 339 4 ms  MV A Aiden: 0 9 m/s  MV Dec Mora: 4 09 m/s2  MV DecT: 188 8 ms  MV E Aiden: 0 77 m/s  MV E/A Ratio: 0 86      NUCLEAR STRESS TEST 9/2018: PERFUSION DEFECTS:  -  There was a small, mildly severe, predominantly fixed myocardial perfusion defect of the apical wall and basal inferior walls, slightly worse on rest images, likely due to artifact      GATED SPECT:  The calculated left ventricular ejection fraction was 32 %  There was diffuse moderately reduced myocardial thickening and motion      SUMMARY:  -  Stress results: Duration of exercise was 6 min and 1 sec   Target heart rate was achieved  There was resting hypertension with a hypertensive blood pressure response to stress  There was no chest pain during stress  -  ECG conclusions: The stress ECG was equivocal for ischemia  The stress ECG was non-diagnostic due to baseline left bundle branch block  -  Perfusion imaging: There was a small, mildly severe, predominantly fixed myocardial perfusion defect of the apical wall and basal inferior walls, slightly worse on rest images, likely due to artifact   -  Gated SPECT: The calculated left ventricular ejection fraction was 32 %  There was diffuse moderately reduced myocardial thickening and motion      IMPRESSIONS: Abnormal study after maximal exercise  There was image artifact, without diagnostic evidence for perfusion abnormality  Left ventricular systolic function was reduced, without distinct regional wall motion abnormalities        EKG:   Not completed, as this appointment was conducted over the phone          Jazmine Morel is a 48 y o  male - SEE ABOVE DOCUMENTATION      Past Medical History:   Diagnosis Date    CAD (coronary artery disease)     Diabetes ([address] )     Dizziness     Erectile dysfunction     GERD (gastroesophageal reflux disease)     Hyperlipidemia     Hypertension     Shortness of breath        Past Surgical History:   Procedure Laterality Date    CARDIAC CATHETERIZATION      ELECTRICAL CARDIOVERSION  2/4/2019            Current Outpatient Medications   Medication Sig Dispense Refill    amLODIPine-benazepril (LOTREL) 5-40 MG per capsule Take 1 capsule by mouth daily 90 capsule 1    apixaban (ELIQUIS) 5 mg Take 1 tablet (5 mg total) by mouth 2 (two) times a day 180 tablet 3    aspirin 81 mg chewable tablet Chew 1 tablet (81 mg total) daily 100 tablet 1    cholecalciferol (VITAMIN D3) 1,000 units tablet Take by mouth daily      co-enzyme Q-10 30 MG capsule Take 30 mg by mouth daily        dofetilide (TIKOSYN) 500 mcg capsule Take 1 capsule (500 mcg total) by mouth every 12 (twelve) hours 60 capsule 11    ivabradine HCl (CORLANOR) 5 MG tablet Take 2 tablets (10 mg total) by mouth 2 (two) times a day 360 tablet 3    metoprolol succinate (TOPROL-XL) 100 mg 24 hr tablet TAKE 1 AND 1/2 TABLETS(150 MG) BY MOUTH TWICE DAILY 270 tablet 2    ONETOUCH DELICA LANCETS 98Y MISC CHECK BID  1    ONETOUCH VERIO test strip CHECK TWICE DAILY  3    OZEMPIC 1 MG/DOSE SOPN INJECT 1 MG SQ Q WEEK  4    rosuvastatin (CRESTOR) 20 MG tablet Take 1 tablet (20 mg total) by mouth daily 90 tablet 1    sildenafil (VIAGRA) 25 MG tablet Take 1 tablet (25 mg total) by mouth daily as needed for erectile dysfunction 10 tablet 0    spironolactone (ALDACTONE) 50 mg tablet Take 1 tablet (50 mg total) by mouth daily 90 tablet 2    SYNJARDY XR 12 5-1000 MG TB24 TK 1 T PO BID  2     No current facility-administered medications for this visit  Allergies   Allergen Reactions    Dye [Iodinated Diagnostic Agents] Hives and GI Intolerance     Definity - name of contrast         I spent 20 minutes with the patient during this visit

## 2020-06-13 DIAGNOSIS — I48.19 PERSISTENT ATRIAL FIBRILLATION (HCC): ICD-10-CM

## 2020-06-15 RX ORDER — DOFETILIDE 0.5 MG/1
CAPSULE ORAL
Qty: 60 CAPSULE | Refills: 11 | Status: SHIPPED | OUTPATIENT
Start: 2020-06-15 | End: 2020-12-11 | Stop reason: SDUPTHER

## 2020-08-10 ENCOUNTER — TRANSCRIBE ORDERS (OUTPATIENT)
Dept: ADMINISTRATIVE | Facility: HOSPITAL | Age: 55
End: 2020-08-10

## 2020-08-10 DIAGNOSIS — G47.30 SLEEP APNEA, UNSPECIFIED TYPE: Primary | ICD-10-CM

## 2020-08-17 ENCOUNTER — TRANSCRIBE ORDERS (OUTPATIENT)
Dept: SLEEP CENTER | Facility: CLINIC | Age: 55
End: 2020-08-17

## 2020-08-17 DIAGNOSIS — G47.30 SLEEP APNEA, UNSPECIFIED: Primary | ICD-10-CM

## 2020-09-01 DIAGNOSIS — I10 ESSENTIAL HYPERTENSION: ICD-10-CM

## 2020-09-01 RX ORDER — AMLODIPINE BESYLATE AND BENAZEPRIL HYDROCHLORIDE 5; 40 MG/1; MG/1
1 CAPSULE ORAL DAILY
Qty: 90 CAPSULE | Refills: 1 | OUTPATIENT
Start: 2020-09-01

## 2020-09-01 NOTE — TELEPHONE ENCOUNTER
Patient needs follow-up appointment  All the blood work that was supposed to be done in February was never completed  He does have history of heart disease  Needs follow-up appointment  Needs labs    Refused medication refill

## 2020-09-04 DIAGNOSIS — R00.0 INAPPROPRIATE SINUS TACHYCARDIA: ICD-10-CM

## 2020-09-04 DIAGNOSIS — I10 ESSENTIAL HYPERTENSION: ICD-10-CM

## 2020-09-04 RX ORDER — AMLODIPINE BESYLATE AND BENAZEPRIL HYDROCHLORIDE 5; 40 MG/1; MG/1
1 CAPSULE ORAL DAILY
Qty: 90 CAPSULE | Refills: 3 | Status: SHIPPED | OUTPATIENT
Start: 2020-09-04 | End: 2020-10-13

## 2020-10-12 LAB
ALBUMIN SERPL-MCNC: 4.5 G/DL (ref 3.6–5.1)
ALBUMIN/CREAT UR: 107 MCG/MG CREAT
ALBUMIN/GLOB SERPL: 2.3 (CALC) (ref 1–2.5)
ALP SERPL-CCNC: 80 U/L (ref 35–144)
ALT SERPL-CCNC: 17 U/L (ref 9–46)
AST SERPL-CCNC: 15 U/L (ref 10–35)
BASOPHILS # BLD AUTO: 47 CELLS/UL (ref 0–200)
BASOPHILS NFR BLD AUTO: 0.5 %
BILIRUB SERPL-MCNC: 0.6 MG/DL (ref 0.2–1.2)
BUN SERPL-MCNC: 17 MG/DL (ref 7–25)
BUN/CREAT SERPL: ABNORMAL (CALC) (ref 6–22)
CALCIUM SERPL-MCNC: 9.4 MG/DL (ref 8.6–10.3)
CHLORIDE SERPL-SCNC: 103 MMOL/L (ref 98–110)
CHOLEST SERPL-MCNC: 151 MG/DL
CHOLEST/HDLC SERPL: 4 (CALC)
CO2 SERPL-SCNC: 30 MMOL/L (ref 20–32)
CREAT SERPL-MCNC: 1.01 MG/DL (ref 0.7–1.33)
CREAT UR-MCNC: 122 MG/DL (ref 20–320)
EOSINOPHIL # BLD AUTO: 94 CELLS/UL (ref 15–500)
EOSINOPHIL NFR BLD AUTO: 1 %
ERYTHROCYTE [DISTWIDTH] IN BLOOD BY AUTOMATED COUNT: 12.9 % (ref 11–15)
GLOBULIN SER CALC-MCNC: 2 G/DL (CALC) (ref 1.9–3.7)
GLUCOSE SERPL-MCNC: 142 MG/DL (ref 65–99)
HBA1C MFR BLD: 5.9 % OF TOTAL HGB
HCT VFR BLD AUTO: 49.1 % (ref 38.5–50)
HDLC SERPL-MCNC: 38 MG/DL
HGB BLD-MCNC: 16 G/DL (ref 13.2–17.1)
LDLC SERPL CALC-MCNC: 92 MG/DL (CALC)
LYMPHOCYTES # BLD AUTO: 1579 CELLS/UL (ref 850–3900)
LYMPHOCYTES NFR BLD AUTO: 16.8 %
MCH RBC QN AUTO: 28.2 PG (ref 27–33)
MCHC RBC AUTO-ENTMCNC: 32.6 G/DL (ref 32–36)
MCV RBC AUTO: 86.4 FL (ref 80–100)
MICROALBUMIN UR-MCNC: 13.1 MG/DL
MONOCYTES # BLD AUTO: 827 CELLS/UL (ref 200–950)
MONOCYTES NFR BLD AUTO: 8.8 %
NEUTROPHILS # BLD AUTO: 6853 CELLS/UL (ref 1500–7800)
NEUTROPHILS NFR BLD AUTO: 72.9 %
NONHDLC SERPL-MCNC: 113 MG/DL (CALC)
PLATELET # BLD AUTO: 221 THOUSAND/UL (ref 140–400)
PMV BLD REES-ECKER: 11.7 FL (ref 7.5–12.5)
POTASSIUM SERPL-SCNC: 4.3 MMOL/L (ref 3.5–5.3)
PROT SERPL-MCNC: 6.5 G/DL (ref 6.1–8.1)
RBC # BLD AUTO: 5.68 MILLION/UL (ref 4.2–5.8)
SL AMB EGFR AFRICAN AMERICAN: 98 ML/MIN/1.73M2
SL AMB EGFR NON AFRICAN AMERICAN: 85 ML/MIN/1.73M2
SODIUM SERPL-SCNC: 140 MMOL/L (ref 135–146)
TRIGL SERPL-MCNC: 110 MG/DL
TSH SERPL-ACNC: 1.58 MIU/L (ref 0.4–4.5)
URATE SERPL-MCNC: 4.4 MG/DL (ref 4–8)
WBC # BLD AUTO: 9.4 THOUSAND/UL (ref 3.8–10.8)

## 2020-10-12 PROCEDURE — 3061F NEG MICROALBUMINURIA REV: CPT | Performed by: FAMILY MEDICINE

## 2020-10-12 PROCEDURE — 3044F HG A1C LEVEL LT 7.0%: CPT | Performed by: FAMILY MEDICINE

## 2020-10-13 ENCOUNTER — OFFICE VISIT (OUTPATIENT)
Dept: FAMILY MEDICINE CLINIC | Facility: CLINIC | Age: 55
End: 2020-10-13
Payer: COMMERCIAL

## 2020-10-13 VITALS
BODY MASS INDEX: 36.88 KG/M2 | TEMPERATURE: 96.5 F | HEIGHT: 67 IN | WEIGHT: 235 LBS | OXYGEN SATURATION: 95 % | SYSTOLIC BLOOD PRESSURE: 138 MMHG | HEART RATE: 80 BPM | DIASTOLIC BLOOD PRESSURE: 78 MMHG | RESPIRATION RATE: 18 BRPM

## 2020-10-13 DIAGNOSIS — I48.19 PERSISTENT ATRIAL FIBRILLATION (HCC): ICD-10-CM

## 2020-10-13 DIAGNOSIS — I25.10 CORONARY ARTERY DISEASE INVOLVING NATIVE CORONARY ARTERY OF NATIVE HEART WITHOUT ANGINA PECTORIS: ICD-10-CM

## 2020-10-13 DIAGNOSIS — Z23 FLU VACCINE NEED: ICD-10-CM

## 2020-10-13 DIAGNOSIS — R06.02 EXERTIONAL SHORTNESS OF BREATH: ICD-10-CM

## 2020-10-13 DIAGNOSIS — Z99.89 OBSTRUCTIVE SLEEP APNEA ON CPAP: ICD-10-CM

## 2020-10-13 DIAGNOSIS — E66.8 MODERATE OBESITY: ICD-10-CM

## 2020-10-13 DIAGNOSIS — G47.33 OBSTRUCTIVE SLEEP APNEA ON CPAP: ICD-10-CM

## 2020-10-13 DIAGNOSIS — Z23 NEED FOR PROPHYLACTIC VACCINATION AGAINST STREPTOCOCCUS PNEUMONIAE (PNEUMOCOCCUS): ICD-10-CM

## 2020-10-13 DIAGNOSIS — I10 ESSENTIAL HYPERTENSION: ICD-10-CM

## 2020-10-13 DIAGNOSIS — Z12.5 PROSTATE CANCER SCREENING: ICD-10-CM

## 2020-10-13 DIAGNOSIS — E11.9 TYPE 2 DIABETES MELLITUS WITHOUT COMPLICATION, WITHOUT LONG-TERM CURRENT USE OF INSULIN (HCC): Primary | ICD-10-CM

## 2020-10-13 PROBLEM — T46.4X5A ACE-INHIBITOR COUGH: Status: ACTIVE | Noted: 2020-10-13

## 2020-10-13 PROBLEM — R05.8 ACE-INHIBITOR COUGH: Status: ACTIVE | Noted: 2020-10-13

## 2020-10-13 PROCEDURE — 99215 OFFICE O/P EST HI 40 MIN: CPT | Performed by: FAMILY MEDICINE

## 2020-10-13 PROCEDURE — 3078F DIAST BP <80 MM HG: CPT | Performed by: FAMILY MEDICINE

## 2020-10-13 PROCEDURE — 90732 PPSV23 VACC 2 YRS+ SUBQ/IM: CPT | Performed by: FAMILY MEDICINE

## 2020-10-13 PROCEDURE — 90471 IMMUNIZATION ADMIN: CPT | Performed by: FAMILY MEDICINE

## 2020-10-13 PROCEDURE — 1036F TOBACCO NON-USER: CPT | Performed by: FAMILY MEDICINE

## 2020-10-13 PROCEDURE — 90472 IMMUNIZATION ADMIN EACH ADD: CPT | Performed by: FAMILY MEDICINE

## 2020-10-13 PROCEDURE — 3725F SCREEN DEPRESSION PERFORMED: CPT | Performed by: FAMILY MEDICINE

## 2020-10-13 PROCEDURE — 90682 RIV4 VACC RECOMBINANT DNA IM: CPT | Performed by: FAMILY MEDICINE

## 2020-10-13 RX ORDER — AMLODIPINE AND VALSARTAN 5; 160 MG/1; MG/1
1 TABLET ORAL DAILY
Qty: 90 TABLET | Refills: 1 | Status: SHIPPED | OUTPATIENT
Start: 2020-10-13 | End: 2021-03-30

## 2020-10-15 PROBLEM — R06.02 EXERTIONAL SHORTNESS OF BREATH: Status: RESOLVED | Noted: 2017-12-08 | Resolved: 2020-10-15

## 2020-10-15 PROBLEM — M10.022 ACUTE IDIOPATHIC GOUT OF LEFT ELBOW: Status: RESOLVED | Noted: 2019-06-21 | Resolved: 2020-10-15

## 2020-10-30 DIAGNOSIS — I48.19 PERSISTENT ATRIAL FIBRILLATION (HCC): ICD-10-CM

## 2020-10-30 RX ORDER — METOPROLOL SUCCINATE 100 MG/1
TABLET, EXTENDED RELEASE ORAL
Qty: 270 TABLET | Refills: 1 | Status: SHIPPED | OUTPATIENT
Start: 2020-10-30 | End: 2020-12-11 | Stop reason: SDUPTHER

## 2020-11-11 ENCOUNTER — OFFICE VISIT (OUTPATIENT)
Dept: FAMILY MEDICINE CLINIC | Facility: CLINIC | Age: 55
End: 2020-11-11
Payer: COMMERCIAL

## 2020-11-11 VITALS
SYSTOLIC BLOOD PRESSURE: 132 MMHG | DIASTOLIC BLOOD PRESSURE: 74 MMHG | BODY MASS INDEX: 36.73 KG/M2 | RESPIRATION RATE: 18 BRPM | WEIGHT: 234 LBS | HEIGHT: 67 IN | TEMPERATURE: 97.4 F | HEART RATE: 80 BPM

## 2020-11-11 DIAGNOSIS — S46.811A SUPRASPINATUS SPRAIN, RIGHT, INITIAL ENCOUNTER: Primary | ICD-10-CM

## 2020-11-11 DIAGNOSIS — S46.811A TRAPEZIUS STRAIN, RIGHT, INITIAL ENCOUNTER: ICD-10-CM

## 2020-11-11 PROCEDURE — 3008F BODY MASS INDEX DOCD: CPT | Performed by: FAMILY MEDICINE

## 2020-11-11 PROCEDURE — 3078F DIAST BP <80 MM HG: CPT | Performed by: FAMILY MEDICINE

## 2020-11-11 PROCEDURE — 3075F SYST BP GE 130 - 139MM HG: CPT | Performed by: FAMILY MEDICINE

## 2020-11-11 PROCEDURE — 99213 OFFICE O/P EST LOW 20 MIN: CPT | Performed by: FAMILY MEDICINE

## 2020-11-11 PROCEDURE — 1036F TOBACCO NON-USER: CPT | Performed by: FAMILY MEDICINE

## 2020-12-01 DIAGNOSIS — I10 ESSENTIAL HYPERTENSION: ICD-10-CM

## 2020-12-02 RX ORDER — SPIRONOLACTONE 50 MG/1
50 TABLET, FILM COATED ORAL DAILY
Qty: 90 TABLET | Refills: 2 | Status: SHIPPED | OUTPATIENT
Start: 2020-12-02 | End: 2020-12-11 | Stop reason: SDUPTHER

## 2020-12-11 ENCOUNTER — OFFICE VISIT (OUTPATIENT)
Dept: CARDIOLOGY CLINIC | Facility: CLINIC | Age: 55
End: 2020-12-11
Payer: COMMERCIAL

## 2020-12-11 VITALS
BODY MASS INDEX: 37.23 KG/M2 | WEIGHT: 237.2 LBS | HEIGHT: 67 IN | SYSTOLIC BLOOD PRESSURE: 122 MMHG | HEART RATE: 69 BPM | DIASTOLIC BLOOD PRESSURE: 82 MMHG

## 2020-12-11 DIAGNOSIS — I10 ESSENTIAL HYPERTENSION: ICD-10-CM

## 2020-12-11 DIAGNOSIS — R00.0 INAPPROPRIATE SINUS TACHYCARDIA: ICD-10-CM

## 2020-12-11 DIAGNOSIS — I48.91 NEW ONSET ATRIAL FIBRILLATION (HCC): ICD-10-CM

## 2020-12-11 DIAGNOSIS — I48.19 PERSISTENT ATRIAL FIBRILLATION (HCC): Primary | ICD-10-CM

## 2020-12-11 DIAGNOSIS — I25.119 CORONARY ARTERY DISEASE INVOLVING NATIVE CORONARY ARTERY OF NATIVE HEART WITH ANGINA PECTORIS (HCC): ICD-10-CM

## 2020-12-11 PROCEDURE — 3074F SYST BP LT 130 MM HG: CPT | Performed by: PHYSICIAN ASSISTANT

## 2020-12-11 PROCEDURE — 99214 OFFICE O/P EST MOD 30 MIN: CPT | Performed by: PHYSICIAN ASSISTANT

## 2020-12-11 PROCEDURE — 93000 ELECTROCARDIOGRAM COMPLETE: CPT | Performed by: PHYSICIAN ASSISTANT

## 2020-12-11 PROCEDURE — 3079F DIAST BP 80-89 MM HG: CPT | Performed by: PHYSICIAN ASSISTANT

## 2020-12-11 RX ORDER — METOPROLOL SUCCINATE 100 MG/1
TABLET, EXTENDED RELEASE ORAL
Qty: 270 TABLET | Refills: 1 | Status: SHIPPED | OUTPATIENT
Start: 2020-12-11 | End: 2021-07-30

## 2020-12-11 RX ORDER — SPIRONOLACTONE 50 MG/1
50 TABLET, FILM COATED ORAL DAILY
Qty: 90 TABLET | Refills: 2 | Status: SHIPPED | OUTPATIENT
Start: 2020-12-11 | End: 2021-03-30

## 2020-12-11 RX ORDER — DOFETILIDE 0.5 MG/1
500 CAPSULE ORAL 2 TIMES DAILY
Qty: 60 CAPSULE | Refills: 11 | Status: SHIPPED | OUTPATIENT
Start: 2020-12-11 | End: 2021-03-30

## 2020-12-16 ENCOUNTER — OFFICE VISIT (OUTPATIENT)
Dept: PODIATRY | Facility: CLINIC | Age: 55
End: 2020-12-16
Payer: COMMERCIAL

## 2020-12-16 VITALS
HEIGHT: 67 IN | HEART RATE: 73 BPM | SYSTOLIC BLOOD PRESSURE: 146 MMHG | WEIGHT: 238 LBS | BODY MASS INDEX: 37.35 KG/M2 | DIASTOLIC BLOOD PRESSURE: 85 MMHG

## 2020-12-16 DIAGNOSIS — E11.9 TYPE 2 DIABETES MELLITUS WITHOUT COMPLICATION, WITHOUT LONG-TERM CURRENT USE OF INSULIN (HCC): ICD-10-CM

## 2020-12-16 PROCEDURE — 99242 OFF/OP CONSLTJ NEW/EST SF 20: CPT | Performed by: PODIATRIST

## 2020-12-16 PROCEDURE — 1036F TOBACCO NON-USER: CPT | Performed by: PODIATRIST

## 2020-12-16 PROCEDURE — 3008F BODY MASS INDEX DOCD: CPT | Performed by: PODIATRIST

## 2021-01-13 ENCOUNTER — IMMUNIZATIONS (OUTPATIENT)
Dept: FAMILY MEDICINE CLINIC | Facility: HOSPITAL | Age: 56
End: 2021-01-13

## 2021-01-13 DIAGNOSIS — Z23 ENCOUNTER FOR IMMUNIZATION: ICD-10-CM

## 2021-01-13 PROCEDURE — 91301 SARS-COV-2 / COVID-19 MRNA VACCINE (MODERNA) 100 MCG: CPT

## 2021-01-13 PROCEDURE — 0011A SARS-COV-2 / COVID-19 MRNA VACCINE (MODERNA) 100 MCG: CPT

## 2021-01-14 LAB
LEFT EYE DIABETIC RETINOPATHY: NORMAL
RIGHT EYE DIABETIC RETINOPATHY: NORMAL

## 2021-01-19 ENCOUNTER — HOSPITAL ENCOUNTER (OUTPATIENT)
Dept: NON INVASIVE DIAGNOSTICS | Facility: HOSPITAL | Age: 56
Discharge: HOME | End: 2021-01-19
Payer: COMMERCIAL

## 2021-01-19 ENCOUNTER — TELEPHONE (OUTPATIENT)
Dept: CARDIOLOGY CLINIC | Facility: CLINIC | Age: 56
End: 2021-01-19

## 2021-01-19 DIAGNOSIS — I48.19 PERSISTENT ATRIAL FIBRILLATION (HCC): ICD-10-CM

## 2021-01-19 PROCEDURE — 93325 DOPPLER ECHO COLOR FLOW MAPG: CPT | Performed by: INTERNAL MEDICINE

## 2021-01-19 PROCEDURE — 93308 TTE F-UP OR LMTD: CPT

## 2021-01-19 PROCEDURE — 93308 TTE F-UP OR LMTD: CPT | Performed by: INTERNAL MEDICINE

## 2021-01-19 PROCEDURE — 93321 DOPPLER ECHO F-UP/LMTD STD: CPT | Performed by: INTERNAL MEDICINE

## 2021-01-19 NOTE — TELEPHONE ENCOUNTER
Patient called as he was concerned that he was back in atrial fibrillation due to the diagnosis code on the echocardiogram showing atrial fibrillation, he thought this meant he was back in AF  Explained the above, highly unlikely to be back in AF as his EF was WNL and heart rate 70bpm with patient being asymptomatic

## 2021-02-05 ENCOUNTER — DOCUMENTATION (OUTPATIENT)
Dept: CARDIOLOGY CLINIC | Facility: CLINIC | Age: 56
End: 2021-02-05

## 2021-02-05 NOTE — PROGRESS NOTES
Received forms via fax  Completed and signed by Savannah LEYVA  Faxed to [facility] at [phone]

## 2021-02-10 ENCOUNTER — IMMUNIZATIONS (OUTPATIENT)
Dept: FAMILY MEDICINE CLINIC | Facility: HOSPITAL | Age: 56
End: 2021-02-10

## 2021-02-10 DIAGNOSIS — Z23 ENCOUNTER FOR IMMUNIZATION: Primary | ICD-10-CM

## 2021-02-10 PROCEDURE — 91301 SARS-COV-2 / COVID-19 MRNA VACCINE (MODERNA) 100 MCG: CPT

## 2021-02-10 PROCEDURE — 0012A SARS-COV-2 / COVID-19 MRNA VACCINE (MODERNA) 100 MCG: CPT

## 2021-02-22 ENCOUNTER — OFFICE VISIT (OUTPATIENT)
Dept: FAMILY MEDICINE CLINIC | Facility: CLINIC | Age: 56
End: 2021-02-22
Payer: COMMERCIAL

## 2021-02-22 VITALS
BODY MASS INDEX: 37.35 KG/M2 | RESPIRATION RATE: 16 BRPM | DIASTOLIC BLOOD PRESSURE: 82 MMHG | HEART RATE: 72 BPM | OXYGEN SATURATION: 98 % | TEMPERATURE: 96.3 F | HEIGHT: 67 IN | WEIGHT: 238 LBS | SYSTOLIC BLOOD PRESSURE: 134 MMHG

## 2021-02-22 DIAGNOSIS — N63.0 BREAST MASS IN MALE: Primary | ICD-10-CM

## 2021-02-22 PROCEDURE — 3725F SCREEN DEPRESSION PERFORMED: CPT | Performed by: FAMILY MEDICINE

## 2021-02-22 PROCEDURE — 99214 OFFICE O/P EST MOD 30 MIN: CPT | Performed by: FAMILY MEDICINE

## 2021-02-22 NOTE — PROGRESS NOTES
Subjective:      Patient ID: Eligio Cranker is a 50 y o  male  61-year-old male presents with his wife complaining of a tender mass in his chest   This recently developed  Patient states that he noticed it after stepping out of the shower a few days ago  Area behind his left breast is tender in seems to be increasing in size  No nipple discharge      Past Medical History:   Diagnosis Date    CAD (coronary artery disease)     Diabetes ([address] )     Dizziness     Erectile dysfunction     GERD (gastroesophageal reflux disease)     Hyperlipidemia     Hypertension     Shortness of breath        Family History   Problem Relation Age of Onset    Coronary artery disease Family     Diabetes Family     No Known Problems Father        Past Surgical History:   Procedure Laterality Date    CARDIAC CATHETERIZATION      ELECTRICAL CARDIOVERSION  2/4/2019             reports that he has never smoked  He quit smokeless tobacco use about 6 years ago  He reports that he does not drink alcohol or use drugs        Current Outpatient Medications:     amLODIPine-valsartan (EXFORGE) 5-160 MG per tablet, Take 1 tablet by mouth daily, Disp: 90 tablet, Rfl: 1    apixaban (ELIQUIS) 5 mg, Take 1 tablet (5 mg total) by mouth 2 (two) times a day, Disp: 180 tablet, Rfl: 3    aspirin 81 mg chewable tablet, Chew 1 tablet (81 mg total) daily (Patient taking differently: Chew 162 mg daily ), Disp: 100 tablet, Rfl: 1    cholecalciferol (VITAMIN D3) 1,000 units tablet, Take by mouth daily, Disp: , Rfl:     co-enzyme Q-10 30 MG capsule, Take 30 mg by mouth daily  , Disp: , Rfl:     dofetilide (TIKOSYN) 500 mcg capsule, Take 1 capsule (500 mcg total) by mouth 2 (two) times a day, Disp: 60 capsule, Rfl: 11    metoprolol succinate (TOPROL-XL) 100 mg 24 hr tablet, One and a half tablets twice a day, Disp: 270 tablet, Rfl: 1    ONETOUCH DELICA LANCETS 87H MISC, CHECK BID, Disp: , Rfl: 1    ONETOUCH VERIO test strip, CHECK TWICE DAILY, Disp: , Rfl: 3    OZEMPIC 1 MG/DOSE SOPN, INJECT 1 MG SQ Q WEEK, Disp: , Rfl: 4    rosuvastatin (CRESTOR) 20 MG tablet, Take 1 tablet (20 mg total) by mouth daily, Disp: 90 tablet, Rfl: 1    spironolactone (ALDACTONE) 50 mg tablet, Take 1 tablet (50 mg total) by mouth daily, Disp: 90 tablet, Rfl: 2    SYNJARDY XR 12 5-1000 MG TB24, TK 1 T PO BID, Disp: , Rfl: 2    ivabradine HCl (CORLANOR) 5 MG tablet, Take 2 tablets (10 mg total) by mouth 2 (two) times a day, Disp: 120 tablet, Rfl: 11    The following portions of the patient's history were reviewed and updated as appropriate: allergies, current medications, past family history, past medical history, past social history, past surgical history and problem list     Review of Systems   Constitutional: Negative  Cardiovascular: Negative for chest pain and palpitations  Left breast enlargement and tenderness           Objective:    /82   Pulse 72   Temp (!) 96 3 °F (35 7 °C) (Tympanic)   Resp 16   Ht 5' 7" (1 702 m)   Wt 108 kg (238 lb)   SpO2 98%   BMI 37 28 kg/m²      Physical Exam  Vitals signs and nursing note reviewed  Constitutional:       General: He is not in acute distress  Appearance: Normal appearance  He is obese  He is not ill-appearing  Cardiovascular:      Rate and Rhythm: Normal rate and regular rhythm  Chest:      Breasts: Breasts are asymmetrical          Left: Swelling, mass and tenderness present  No inverted nipple, nipple discharge or skin change  Lymphadenopathy:      Upper Body:      Left upper body: No axillary or pectoral adenopathy  Neurological:      Mental Status: He is alert             Recent Results (from the past 1008 hour(s))    Diabetes Eye Exam    Collection Time: 01/14/21 12:20 PM   Result Value Ref Range    Right Eye Diabetic Retinopathy None     Left Eye Diabetic Retinopathy None        Assessment/Plan:    Breast mass in male  - this is likely gynecomastia related to spironolactone use  I did explain that to both the patient and his wife  Reassured them  I will send him for mammogram with diagnostic ultrasound if necessary     -I did send message to cardiologist notifying that he is likely developing gynecomastia due to spironolactone in they may want to consider changing medication          Problem List Items Addressed This Visit        Other    Breast mass in male - Primary     - this is likely gynecomastia related to spironolactone use  I did explain that to both the patient and his wife  Reassured them    I will send him for mammogram with diagnostic ultrasound if necessary     -I did send message to cardiologist notifying that he is likely developing gynecomastia due to spironolactone in they may want to consider changing medication         Relevant Orders    Mammo diagnostic left w cad

## 2021-02-22 NOTE — ASSESSMENT & PLAN NOTE
- this is likely gynecomastia related to spironolactone use  I did explain that to both the patient and his wife  Reassured them    I will send him for mammogram with diagnostic ultrasound if necessary     -I did send message to cardiologist notifying that he is likely developing gynecomastia due to spironolactone in they may want to consider changing medication

## 2021-02-23 ENCOUNTER — TELEPHONE (OUTPATIENT)
Dept: CARDIOLOGY CLINIC | Facility: CLINIC | Age: 56
End: 2021-02-23

## 2021-02-23 NOTE — TELEPHONE ENCOUNTER
Patient last had a visit on 12/11/20  Patient called today stating he saw Dr Gail Haynes yesterday because of a breast mass  Patient states Adonis Sanchez wanted to know if he can get off Spironolactone 50mg because he believes this can be a side effect to this breast mass? Patient also mentioned that he has been "having problems in the bed room", he would like to know if you can prescribe a medication to help him?  Please advise, thank you

## 2021-02-24 NOTE — TELEPHONE ENCOUNTER
Please notify the patient that I did receive message from cardiologist in regards to his request for medication for erectile dysfunction  He can either schedule a virtual visit to discuss this specifically or we can discuss it when he schedules a follow-up appointment in April after his follow-up blood work is completed on April 13th    He does need to schedule that follow-up appointment in later April

## 2021-02-26 NOTE — TELEPHONE ENCOUNTER
Patient is aware of stopping the medication patient will continue to keep track of his BP  I will call patient to get BP log within the next few weeks

## 2021-03-05 ENCOUNTER — HOSPITAL ENCOUNTER (OUTPATIENT)
Dept: ULTRASOUND IMAGING | Facility: CLINIC | Age: 56
Discharge: HOME | End: 2021-03-05
Payer: COMMERCIAL

## 2021-03-05 ENCOUNTER — HOSPITAL ENCOUNTER (OUTPATIENT)
Dept: MAMMOGRAPHY | Facility: CLINIC | Age: 56
Discharge: HOME | End: 2021-03-05
Payer: COMMERCIAL

## 2021-03-05 ENCOUNTER — TELEPHONE (OUTPATIENT)
Dept: FAMILY MEDICINE CLINIC | Facility: CLINIC | Age: 56
End: 2021-03-05

## 2021-03-05 VITALS — HEIGHT: 67 IN | BODY MASS INDEX: 37.35 KG/M2 | WEIGHT: 238 LBS

## 2021-03-05 DIAGNOSIS — N63.0 BREAST MASS IN MALE: ICD-10-CM

## 2021-03-05 PROCEDURE — 77066 DX MAMMO INCL CAD BI: CPT

## 2021-03-05 PROCEDURE — G0279 TOMOSYNTHESIS, MAMMO: HCPCS

## 2021-03-05 PROCEDURE — 76642 ULTRASOUND BREAST LIMITED: CPT

## 2021-03-05 NOTE — TELEPHONE ENCOUNTER
Patient states he called Dr Griselda Settle and he told pt whatever Dr Erika Reddy wants to put him on in place of the spirolactone he is fine with? Please advise?

## 2021-03-05 NOTE — TELEPHONE ENCOUNTER
----- Message from Stacie Aguero DO sent at 3/5/2021  8:41 AM EST -----  Please call the patient regarding his abnormal result  Ultrasound showed unilateral moderate gynecomastia or breast enlargement on the left and none on the right  Mammogram did not show any evidence of any abnormality  No evidence of cancer  This is a side effect of spironolactone  I did send message to Dr Fito White  They are aware and they should be making adjustment to his medications

## 2021-03-05 NOTE — TELEPHONE ENCOUNTER
I am not putting him on any other agents  As per notes from Dr Mary Kay Osei and his staff they were going to have him keep track of his blood pressure readings and they will address that at his follow-up appointment on March 30th    I am not managing his blood pressure

## 2021-03-05 NOTE — TELEPHONE ENCOUNTER
I spoke with dad.  She probably does not want surgery this summer.  Plan will be follow up in 6 months and consider surgery next summer if still worsening.     ----- Message from Isela Luna MA sent at 6/13/2018  3:58 PM CDT -----  Contact: Fox [phone]  Would you be able to speak with patient father more about the surgery?  ----- Message -----  From: Jozef Hernández  Sent: 6/13/2018   3:30 PM  To: Yefri MILLER Staff    Needs Advice    Reason for call: Pt Surgery    Communication Preference:Call Back    Additional Information:Fox [phone]----calling to spk with above provider regarding the pt surgery that the provider is trying to schedule for the pt. Fox states that he was told to contact the provider to talk about the procedure. Fox is requesting a call back       Patient was advised of results

## 2021-03-23 NOTE — TELEPHONE ENCOUNTER
I called patient to see how he was doing and to get his bp log  I was told by his wife that he is doing well but his blood pressure is fluctuating   Below is log of his bp  2/22- 126/91  3/3-140/84   3/4- 141/91   3/5- 132/86   3/13- 115/73   3/15- 138/90  3//83   3/18- 139/89   3/23- 145/95   Please advise, thank you

## 2021-03-30 ENCOUNTER — OFFICE VISIT (OUTPATIENT)
Dept: CARDIOLOGY CLINIC | Facility: CLINIC | Age: 56
End: 2021-03-30
Payer: COMMERCIAL

## 2021-03-30 VITALS
BODY MASS INDEX: 38.89 KG/M2 | HEART RATE: 68 BPM | DIASTOLIC BLOOD PRESSURE: 82 MMHG | HEIGHT: 67 IN | WEIGHT: 247.8 LBS | SYSTOLIC BLOOD PRESSURE: 140 MMHG

## 2021-03-30 DIAGNOSIS — N52.01 ERECTILE DYSFUNCTION DUE TO ARTERIAL INSUFFICIENCY: ICD-10-CM

## 2021-03-30 DIAGNOSIS — I48.19 PERSISTENT ATRIAL FIBRILLATION (HCC): Primary | ICD-10-CM

## 2021-03-30 DIAGNOSIS — I10 ESSENTIAL HYPERTENSION: ICD-10-CM

## 2021-03-30 DIAGNOSIS — N52.1 ERECTILE DYSFUNCTION DUE TO DISEASES CLASSIFIED ELSEWHERE: ICD-10-CM

## 2021-03-30 DIAGNOSIS — I25.10 CORONARY ARTERY DISEASE INVOLVING NATIVE CORONARY ARTERY OF NATIVE HEART WITHOUT ANGINA PECTORIS: ICD-10-CM

## 2021-03-30 PROCEDURE — 3079F DIAST BP 80-89 MM HG: CPT | Performed by: INTERNAL MEDICINE

## 2021-03-30 PROCEDURE — 3077F SYST BP >= 140 MM HG: CPT | Performed by: INTERNAL MEDICINE

## 2021-03-30 PROCEDURE — 93000 ELECTROCARDIOGRAM COMPLETE: CPT | Performed by: INTERNAL MEDICINE

## 2021-03-30 PROCEDURE — 3008F BODY MASS INDEX DOCD: CPT | Performed by: INTERNAL MEDICINE

## 2021-03-30 PROCEDURE — 99214 OFFICE O/P EST MOD 30 MIN: CPT | Performed by: INTERNAL MEDICINE

## 2021-03-30 RX ORDER — DOFETILIDE 0.12 MG/1
500 CAPSULE ORAL 2 TIMES DAILY
Qty: 180 CAPSULE | Refills: 3 | Status: SHIPPED | OUTPATIENT
Start: 2021-03-30 | End: 2021-04-13 | Stop reason: SDUPTHER

## 2021-03-30 RX ORDER — AMLODIPINE AND VALSARTAN 5; 160 MG/1; MG/1
2 TABLET ORAL DAILY
Qty: 180 TABLET | Refills: 3 | Status: SHIPPED | OUTPATIENT
Start: 2021-03-30 | End: 2021-09-22

## 2021-03-30 NOTE — PROGRESS NOTES
HEART AND VASCULAR  CARDIAC   [address] [facility]    Outpatient  Follow-up  Today's Date: 03/30/21        Patient name: Tere Jeter  YOB: 1965  Sex: male         Chief Complaint: f/u afib      ASSESSMENT:  Problem List Items Addressed This Visit        Cardiovascular and Mediastinum    CAD (coronary artery disease)    Relevant Medications    amLODIPine-valsartan (EXFORGE) 5-160 MG per tablet    Other Relevant Orders    Ambulatory referral to Cardiology    Hypertension    Relevant Medications    amLODIPine-valsartan (EXFORGE) 5-160 MG per tablet    Other Relevant Orders    Ambulatory referral to Cardiology    Persistent atrial fibrillation ([facility] [address] ) - Primary    Relevant Medications    dofetilide (TIKOSYN) 125 mcg capsule    Other Relevant Orders    POCT ECG      Other Visit Diagnoses     Erectile dysfunction due to arterial insufficiency        Erectile dysfunction due to diseases classified elsewhere        Relevant Orders    Ambulatory referral to Urology        1) Persistent afib, in NSR x 2 years after ablation/tikosyn  Feels good  CHADS2Vasc=3 and on Eliquis    2) H/o Systolic chf EF 96% improved 55% (jan 20210 after rhythm control, 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    3) Stabl eCAD h/o PCI to LAD 2015    4) HTN mildly elevated sicne we stopped spirolactone (had gynecomastia from this)    5) NORA on CPAP    6) obesity- he has lost a lot of weight w diet oracio exercise    7) T2DM      PLAN:  1  Decrease tikosyn to 125mcg bid and plan to wean over time    2  Increase amlodipine/valsartan combo to 10mg/320mg dose for HTN    3  Refer to Dr Leonila Barton Cardiology at [facility], he has multiple issues and needs a good cardiologist in addition to EP    4   Refer to Urology for ED (viagra not working)      Follow up in: 3 months    Orders Placed This Encounter   Procedures    Ambulatory referral to Urology    Ambulatory referral to Cardiology    POCT ECG Medications Discontinued During This Encounter   Medication Reason    spironolactone (ALDACTONE) 50 mg tablet     dofetilide (TIKOSYN) 500 mcg capsule     amLODIPine-valsartan (EXFORGE) 5-160 MG per tablet              HPI/Subjective:     1) Persistent afib, in NSR x 2 years after ablation/tikosyn  Feels good  CHADS2Vasc=3 and on Eliquis    2) H/o Systolic chf EF 47% improved 55% (jan 20210 after rhythm control, 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    3) Stabl eCAD h/o PCI to LAD 2015    4) HTN mildly elevated sicne we stopped spirolactone (had gynecomastia from this)    5) NORA on CPAP    6) obesity- he has lost a lot of weight w diet oracio exercise    7) T2DM    Antonio Fernandez is doing well, feels good  No edema  No Cp/sob  No recurrence of afib he is aware of  He noticed some gynecomastia so we stopped spirolactone  BP creaking up to SBP 140s since then  Please note HPI is listed by problem with with update following it, it is copied again in the assessment above and reflects medical decision making as well  Complete 12 point ROS reviewed and otherwise non pertinent or negative except as per HPI pertinent positives in Cardiovascular and Respiratory emphasized  Please see paper chart for outpatient clinic patients where the patient completed the 12 point ROS survey  Past Medical History:   Diagnosis Date    CAD (coronary artery disease)     Diabetes ([facility] [address] )     Dizziness     Erectile dysfunction     GERD (gastroesophageal reflux disease)     Hyperlipidemia     Hypertension     Shortness of breath        Allergies   Allergen Reactions    Dye [Iodinated Diagnostic Agents] Hives and GI Intolerance     Definity - name of contrast      Aceinhibitors [Ace Inhibitors] Cough     I reviewed the Home Medication list and Allergies in the chart     Scheduled Meds:  Current Outpatient Medications   Medication Sig Dispense Refill    amLODIPine-valsartan (EXFORGE) 5-160 MG per tablet Take 2 tablets by mouth daily 180 tablet 3    apixaban (ELIQUIS) 5 mg Take 1 tablet (5 mg total) by mouth 2 (two) times a day 180 tablet 3    aspirin 81 mg chewable tablet Chew 1 tablet (81 mg total) daily (Patient taking differently: Chew 162 mg daily ) 100 tablet 1    cholecalciferol (VITAMIN D3) 1,000 units tablet Take by mouth daily      co-enzyme Q-10 30 MG capsule Take 30 mg by mouth daily        dofetilide (TIKOSYN) 125 mcg capsule Take 4 capsules (500 mcg total) by mouth 2 (two) times a day 180 capsule 3    ivabradine HCl (CORLANOR) 5 MG tablet Take 2 tablets (10 mg total) by mouth 2 (two) times a day 120 tablet 11    metoprolol succinate (TOPROL-XL) 100 mg 24 hr tablet One and a half tablets twice a day 270 tablet 1    ONETOUCH DELICA LANCETS 41W MISC CHECK BID  1    ONETOUCH VERIO test strip CHECK TWICE DAILY  3    OZEMPIC 1 MG/DOSE SOPN INJECT 1 MG SQ Q WEEK  4    rosuvastatin (CRESTOR) 20 MG tablet Take 1 tablet (20 mg total) by mouth daily 90 tablet 1    SYNJARDY XR 12 5-1000 MG TB24 TK 1 T PO BID  2     No current facility-administered medications for this visit        PRN Meds:         Family History   Problem Relation Age of Onset    Coronary artery disease Family     Diabetes Family     No Known Problems Father        Social History     Socioeconomic History    Marital status: /Civil Union     Spouse name: Not on file    Number of children: Not on file    Years of education: Not on file    Highest education level: Not on file   Occupational History    Not on file   Social Needs    Financial resource strain: Not on file    Food insecurity     Worry: Not on file     Inability: Not on file   [employer] needs     Medical: Not on file     Non-medical: Not on file   Tobacco Use    Smoking status: Never Smoker    Smokeless tobacco: Former User   Substance and Sexual Activity    Alcohol use: No    Drug use: No    Sexual activity: Not on file   Lifestyle    Physical activity     Days per week: Not on file     Minutes per session: Not on file    Stress: Not on file   Relationships    Social connections     Talks on phone: Not on file     Gets together: Not on file     Attends Mu-ism service: Not on file     Active member of club or organization: Not on file     Attends meetings of clubs or organizations: Not on file     Relationship status: Not on file    Intimate partner violence     Fear of current or ex partner: Not on file     Emotionally abused: Not on file     Physically abused: Not on file     Forced sexual activity: Not on file   Other Topics Concern    Not on file   Social History Narrative    Not on file         OBJECTIVE:    /82 (BP Location: Left arm, Patient Position: Sitting, Cuff Size: Large)   Pulse 68   Ht 5' 7" (1 702 m)   Wt 112 kg (247 lb 12 8 oz)   BMI 38 81 kg/m²   Vitals:    03/30/21 0817   Weight: 112 kg (247 lb 12 8 oz)     GEN: No acute distress, Alert and oriented, well appearing  HEENT:Wearing a mask  External ears normal  EYES: Pupils equal, sclera anicteric, midline, normal conjuctiva  NECK: No JVD, supple, no obvious masses or thryomegaly or goiter  CARDIOVASCULAR:  RRR, No murmur, rub, gallops S1,S2  LUNGS: Clear To auscultation bilaterally, normal effort, no rales, rhonchi, crackles  ABDOMEN:  nondistended,  without obvious organomegaly or ascites  EXTREMITIES/VASCULAR:  No edema  Radial pulses intact, pedal pulses difficult to palpate, warm an well perfused  PSYCH: Normal Affect, no overt suicidal ideation, linear speech pattern without evidence of psychosis  NEURO: Grossly intact, moving all extremiteis equal, face symmetric, alert and responsive, no obvious focal defecits  GAIT:  Ambulates normally without difficulty  HEME: No bleeding, bruising, petechia, purpura  SKIN: No significant rashes, warm, no diaphoresis or pallor       Lab Results:       LABS: Chemistry        Component Value Date/Time    K 4 3 10/09/2020 0759     10/09/2020 0759    CO2 30 10/09/2020 0759    BUN 17 10/09/2020 0759    CREATININE 1 01 10/09/2020 0759    CREATININE 0 88 02/04/2019 0817        Component Value Date/Time    CALCIUM 9 4 10/09/2020 0759    ALKPHOS 80 10/09/2020 0759    AST 15 10/09/2020 0759    ALT 17 10/09/2020 0759            No results found for: CHOL  Lab Results   Component Value Date    HDL 38 (L) 10/09/2020    HDL 35 (L) 09/23/2019     Lab Results   Component Value Date    LDLCALC 92 10/09/2020    LDLCALC 89 09/23/2019     Lab Results   Component Value Date    TRIG 110 10/09/2020    TRIG 78 09/23/2019     No results found for: CHOLHDL    IMAGING: Us Breast Left Limited (diagnostic), Mammo Diagnostic Bilateral W 3d & Cad    Result Date: 3/5/2021  Narrative: DIAGNOSIS: Breast mass in male TECHNIQUE: Digital diagnostic mammography was performed  Computer Aided Detection (CAD) analyzed all applicable images  Ultrasound of the left breast(s) was performed  COMPARISONS: None available  RELEVANT HISTORY: Family Breast Cancer History: No known family history of breast cancer  Family Medical History: No known relevant family medical history  Personal History: No known relevant hormone history  No known relevant surgical history  No known relevant medical history  RISK ASSESSMENT: [facility] reporting was suppressed due to the patient's history and/or demographic factors  TISSUE DENSITY: The breasts are almost entirely fatty  INDICATION: Robbie Paz is a 50 y o  male presenting for evaluation of a tender palpable left breast mass  FINDINGS: Palpable triangle marker over the left breast has moderate subjacent gynecomastia  There is no underlying mass on mammography  Sonographic evaluation left retroareolar breast was performed in the area of palpable tender concern  There is moderate gynecomastia  There is no significant gynecomastia on the right    No suspicious masses in right breast on mammography  Impression:   Moderate left gynecomastia accounting for patient's tender palpable concern  Recommend clinical management  ASSESSMENT/BI-RADS CATEGORY: Left: 2 - Benign Right: 1 - Negative Overall: 2 - Benign RECOMMENDATION:      - Clinical management for both breasts  Workstation ID: [id number]        Cardiac testing:   No results found for this or any previous visit  Results for orders placed during the hospital encounter of 19   ELZBIETA    Narrative [address]  [facility], [address] [facility]  [phone]    Transesophageal Echocardiogram  2D, Doppler, and Color Doppler    Study date:  2019    Patient: Joceline Medina  MR number: [id number]  Account number: [de-identified]  : 1965  Age: 48 years  Gender: Male  Status: Inpatient  Location: Echo lab  Height: 67 in  Weight: 235 lb  BP: 117/ 83 mmHg    Indications: Afib    Diagnoses: I48 0 - Atrial fibrillation    Sonographer:  ALEX Gomez  Primary Physician:  Dawson Garcia DO  Referring Physician:  Olive Dimas MD  Group:  [id number]  Interpreting Physician:  Jose A Zimmer MD    SUMMARY    LEFT VENTRICLE:  The ventricle was moderately dilated  Systolic function was moderately to markedly reduced  Ejection fraction was estimated to be 30 %  LEFT ATRIUM:  The atrium was mildly to moderately dilated  TRICUSPID VALVE:  There was mild regurgitation  HISTORY: PRIOR HISTORY: Afib, CAD, HTN, NORA, DM2, HLD, Former smoker, Cardiomyopathy    PROCEDURE: The procedure was performed in the echo lab  This was a routine study  The risks and alternatives of the procedure were explained to the patient and informed consent was obtained  The transesophageal approach was used  The study  included complete 2D imaging, complete spectral Doppler, and color Doppler  The heart rate was 114 bpm, at the start of the study   An adult omniplane probe was inserted by the attending cardiologist  Maycol Mares with difficulty  Four  intubation attempt(s)  There was no blood detected on the probe  Image quality was adequate  MEDICATIONS: Anesthesia  LEFT VENTRICLE: The ventricle was moderately dilated  Systolic function was moderately to markedly reduced  Ejection fraction was estimated to be 30 %  RIGHT VENTRICLE: The size was normal  Systolic function was normal  Wall thickness was normal     LEFT ATRIUM: The atrium was mildly to moderately dilated  No mass was present  There was no spontaneous echo contrast ("smoke")  APPENDAGE: The size was normal  No thrombus was identified  DOPPLER: The function was mildly reduced (mildly  reduced emptying velocity)  MITRAL VALVE: Valve structure was normal  There was normal leaflet separation  There was no echocardiographic evidence of vegetation  DOPPLER: There was no regurgitation  AORTIC VALVE: The valve was trileaflet  Leaflets exhibited normal thickness and normal cuspal separation  There was no echocardiographic evidence of vegetation  DOPPLER: There was no regurgitation  TRICUSPID VALVE: The valve structure was normal  There was normal leaflet separation  There was no echocardiographic evidence of vegetation  DOPPLER: There was mild regurgitation  PERICARDIUM: There was no pericardial effusion  The pericardium was normal in appearance  [address] Accredited Echocardiography Laboratory    Prepared and electronically signed by    Yuliya Cardoso MD  Signed 25-Jan-2019 17:11:44       No results found for this or any previous visit  No results found for this or any previous visit  I reviewed and interpreted the following LABS/EKG/TELE/IMAGING and below is summary of my interpretation (if data available):        Current EKG and Rhythm Strip:NSR, nonspecific St changes QTC 457ms

## 2021-04-12 RX ORDER — EZETIMIBE 10 MG/1
10 TABLET ORAL DAILY
COMMUNITY
Start: 2021-04-08 | End: 2022-07-20

## 2021-04-12 RX ORDER — METOPROLOL TARTRATE 50 MG/1
50 TABLET, FILM COATED ORAL 2 TIMES DAILY
COMMUNITY
Start: 2021-04-08 | End: 2021-09-22 | Stop reason: ALTCHOICE

## 2021-04-13 ENCOUNTER — OFFICE VISIT (OUTPATIENT)
Dept: FAMILY MEDICINE CLINIC | Facility: CLINIC | Age: 56
End: 2021-04-13
Payer: COMMERCIAL

## 2021-04-13 VITALS
RESPIRATION RATE: 16 BRPM | DIASTOLIC BLOOD PRESSURE: 82 MMHG | HEART RATE: 76 BPM | BODY MASS INDEX: 38.92 KG/M2 | WEIGHT: 248 LBS | OXYGEN SATURATION: 98 % | HEIGHT: 67 IN | SYSTOLIC BLOOD PRESSURE: 124 MMHG

## 2021-04-13 DIAGNOSIS — E11.9 TYPE 2 DIABETES MELLITUS WITHOUT COMPLICATION, WITHOUT LONG-TERM CURRENT USE OF INSULIN (HCC): ICD-10-CM

## 2021-04-13 DIAGNOSIS — I25.10 CORONARY ARTERY DISEASE INVOLVING NATIVE CORONARY ARTERY OF NATIVE HEART WITHOUT ANGINA PECTORIS: Primary | ICD-10-CM

## 2021-04-13 DIAGNOSIS — Z12.5 PROSTATE CANCER SCREENING: ICD-10-CM

## 2021-04-13 DIAGNOSIS — E66.8 MODERATE OBESITY: ICD-10-CM

## 2021-04-13 DIAGNOSIS — I10 ESSENTIAL HYPERTENSION: ICD-10-CM

## 2021-04-13 DIAGNOSIS — I48.19 PERSISTENT ATRIAL FIBRILLATION (HCC): ICD-10-CM

## 2021-04-13 DIAGNOSIS — N52.2 DRUG-INDUCED ERECTILE DYSFUNCTION: ICD-10-CM

## 2021-04-13 DIAGNOSIS — Z23 NEED FOR PROPHYLACTIC VACCINATION AGAINST DIPHTHERIA AND TETANUS: ICD-10-CM

## 2021-04-13 DIAGNOSIS — E78.2 MIXED HYPERLIPIDEMIA: ICD-10-CM

## 2021-04-13 PROBLEM — N63.0 BREAST MASS IN MALE: Status: RESOLVED | Noted: 2021-02-22 | Resolved: 2021-04-13

## 2021-04-13 PROBLEM — T46.4X5A ACE-INHIBITOR COUGH: Status: RESOLVED | Noted: 2020-10-13 | Resolved: 2021-04-13

## 2021-04-13 PROBLEM — R05.8 ACE-INHIBITOR COUGH: Status: RESOLVED | Noted: 2020-10-13 | Resolved: 2021-04-13

## 2021-04-13 PROBLEM — R42 DIZZINESS: Status: RESOLVED | Noted: 2017-12-08 | Resolved: 2021-04-13

## 2021-04-13 PROCEDURE — 3008F BODY MASS INDEX DOCD: CPT | Performed by: FAMILY MEDICINE

## 2021-04-13 PROCEDURE — 1036F TOBACCO NON-USER: CPT | Performed by: FAMILY MEDICINE

## 2021-04-13 PROCEDURE — 99215 OFFICE O/P EST HI 40 MIN: CPT | Performed by: FAMILY MEDICINE

## 2021-04-13 PROCEDURE — 3074F SYST BP LT 130 MM HG: CPT | Performed by: FAMILY MEDICINE

## 2021-04-13 PROCEDURE — 3079F DIAST BP 80-89 MM HG: CPT | Performed by: FAMILY MEDICINE

## 2021-04-13 PROCEDURE — 90471 IMMUNIZATION ADMIN: CPT | Performed by: FAMILY MEDICINE

## 2021-04-13 PROCEDURE — 90715 TDAP VACCINE 7 YRS/> IM: CPT | Performed by: FAMILY MEDICINE

## 2021-04-13 RX ORDER — DOFETILIDE 0.12 MG/1
125 CAPSULE ORAL 2 TIMES DAILY
Qty: 60 CAPSULE | Refills: 0
Start: 2021-04-13 | End: 2021-08-24

## 2021-04-13 RX ORDER — SILDENAFIL 50 MG/1
50 TABLET, FILM COATED ORAL DAILY PRN
Qty: 10 TABLET | Refills: 3 | Status: SHIPPED | OUTPATIENT
Start: 2021-04-13 | End: 2021-06-11 | Stop reason: SDUPTHER

## 2021-04-13 RX ORDER — SILDENAFIL 25 MG/1
25 TABLET, FILM COATED ORAL DAILY PRN
COMMUNITY
End: 2021-04-13 | Stop reason: SDUPTHER

## 2021-04-13 NOTE — PROGRESS NOTES
Subjective:      Patient ID: Marychuy Pritchett is a 50 y o  male  82-year-old male with past medical history of heart disease, obesity, hyperlipidemia, hypertension, diabetes mellitus type 2 presents for follow-up of chronic conditions  Patient did see electrophysiologist and endocrinologist recently  Endocrinologist has made adjustments to his diabetic regimen  Electrophysiologist reduced his dose of Tikosyn and increase his dose of amlodipine/valsartan  Patient was still taking 500 mcg of Tikosyn twice daily and that is being wean down to 125 mcg twice daily  Patient is being turned over from electrophysiologist to cardiologist   He does have upcoming appointment  Patient has had some weight gain  He has been working some longer hours as 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  With that he was eating outside of the house so he has gained some weight  Endocrinologist has address this  Patient is aware of this  Labs reviewed which showed a urine negative for microalbumin, normal CBC, TSH at 1 97, hemoglobin A1c at 6 2%, total cholesterol 137, HDL 34, triglycerides 93, LDL 84  Uric acid and PSA were not performed  ED is better with Viagra 25mg but not always able to maintain an erection  Patient states that approximately 8 times out of 10 he is able to maintain erection until completion  Wondering if the dose of Viagra can be increased  He has not experienced any side effects    No dizziness, lightheadedness or even headache      Past Medical History:   Diagnosis Date    Abnormal EKG 12/8/2017    ACE-inhibitor cough 10/13/2020    Breast mass in male 2/22/2021    CAD (coronary artery disease)     Diabetes (HCC)     Dizziness     Erectile dysfunction     GERD (gastroesophageal reflux disease)     Hyperlipidemia     Hypertension     Shortness of breath        Family History   Problem Relation Age of Onset    Coronary artery disease Family     Diabetes Family     No Known Problems Father        Past Surgical History:   Procedure Laterality Date    CARDIAC CATHETERIZATION      ELECTRICAL CARDIOVERSION  2/4/2019             reports that he has never smoked  He quit smokeless tobacco use about 6 years ago  He reports that he does not drink alcohol or use drugs        Current Outpatient Medications:     amLODIPine-valsartan (EXFORGE) 5-160 MG per tablet, Take 2 tablets by mouth daily, Disp: 180 tablet, Rfl: 3    apixaban (ELIQUIS) 5 mg, Take 1 tablet (5 mg total) by mouth 2 (two) times a day, Disp: 180 tablet, Rfl: 3    aspirin 81 mg chewable tablet, Chew 1 tablet (81 mg total) daily (Patient taking differently: Chew 162 mg daily ), Disp: 100 tablet, Rfl: 1    cholecalciferol (VITAMIN D3) 1,000 units tablet, Take by mouth daily, Disp: , Rfl:     co-enzyme Q-10 30 MG capsule, Take 30 mg by mouth daily  , Disp: , Rfl:     dofetilide (TIKOSYN) 125 mcg capsule, Take 1 capsule (125 mcg total) by mouth 2 (two) times a day, Disp: 60 capsule, Rfl: 0    ezetimibe (ZETIA) 10 mg tablet, Take 10 mg by mouth daily, Disp: , Rfl:     metoprolol succinate (TOPROL-XL) 100 mg 24 hr tablet, One and a half tablets twice a day, Disp: 270 tablet, Rfl: 1    ONETOUCH DELICA LANCETS 50Y MISC, CHECK BID, Disp: , Rfl: 1    ONETOUCH VERIO test strip, CHECK TWICE DAILY, Disp: , Rfl: 3    OZEMPIC 1 MG/DOSE SOPN, INJECT 1 MG SQ Q WEEK, Disp: , Rfl: 4    rosuvastatin (CRESTOR) 20 MG tablet, Take 1 tablet (20 mg total) by mouth daily, Disp: 90 tablet, Rfl: 1    sildenafil (VIAGRA) 50 MG tablet, Take 1 tablet (50 mg total) by mouth daily as needed for erectile dysfunction, Disp: 10 tablet, Rfl: 3    SYNJARDY XR 12 5-1000 MG TB24, TK 1 T PO BID, Disp: , Rfl: 2    ivabradine HCl (CORLANOR) 5 MG tablet, Take 2 tablets (10 mg total) by mouth 2 (two) times a day, Disp: 120 tablet, Rfl: 11    metoprolol tartrate (LOPRESSOR) 50 mg tablet, Take 50 mg by mouth 2 (two) times a day, Disp: , Rfl:     The following portions of the patient's history were reviewed and updated as appropriate: allergies, current medications, past family history, past medical history, past social history, past surgical history and problem list     Review of Systems   Constitutional: Positive for unexpected weight change (Weight gain)  HENT: Negative  Eyes: Negative  Respiratory: Negative  Cardiovascular: Negative  Gastrointestinal: Negative  Endocrine: Negative  Genitourinary: Negative  Erectile dysfunction   Musculoskeletal: Negative  Skin: Negative  Allergic/Immunologic: Negative  Neurological: Negative  Hematological: Negative  Psychiatric/Behavioral: Negative  All other systems reviewed and are negative  Objective:    /82   Pulse 76   Resp 16   Ht 5' 7" (1 702 m)   Wt 112 kg (248 lb)   SpO2 98%   BMI 38 84 kg/m²      Physical Exam  Vitals signs and nursing note reviewed  Constitutional:       General: He is not in acute distress  Appearance: Normal appearance  He is well-developed  He is obese  He is not ill-appearing  HENT:      Head: Normocephalic and atraumatic  Right Ear: Tympanic membrane, ear canal and external ear normal       Left Ear: Tympanic membrane, ear canal and external ear normal    Eyes:      Extraocular Movements: Extraocular movements intact  Conjunctiva/sclera: Conjunctivae normal       Pupils: Pupils are equal, round, and reactive to light  Neck:      Musculoskeletal: Normal range of motion and neck supple  Cardiovascular:      Rate and Rhythm: Normal rate and regular rhythm  Pulses: Normal pulses  Heart sounds: Normal heart sounds  No murmur  Pulmonary:      Effort: Pulmonary effort is normal       Breath sounds: Normal breath sounds  Abdominal:      General: Abdomen is flat  Bowel sounds are normal       Palpations: Abdomen is soft     Musculoskeletal: Normal range of motion  Right lower leg: No edema  Left lower leg: No edema  Skin:     General: Skin is warm and dry  Neurological:      General: No focal deficit present  Mental Status: He is alert and oriented to person, place, and time  Psychiatric:         Mood and Affect: Mood normal          Behavior: Behavior normal          Thought Content: Thought content normal          Judgment: Judgment normal            No results found for this or any previous visit (from the past 1008 hour(s))  Assessment/Plan:    Type 2 diabetes mellitus without complication, without long-term current use of insulin ([address] )    Lab Results   Component Value Date    HGBA1C 5 9 (H) 10/09/2020     Managed by endocrinologist   [place] with endocrinologist as scheduled    Persistent atrial fibrillation [facility])  Patient is in sinus rhythm  He is on metoprolol and on a decreased dose of Tikosyn  This was confirmed with electrophysiologist   He will be seeing cardiologist   Patient is presently on Eliquis  He does have prior history of drug-eluting stent  Patient is compliant with CPAP  Follow-up with Cardiology as scheduled    Essential hypertension  Hypertension seems to be well controlled on current medication regimen  Weight loss would be beneficial   Patient is presently on amlodipine/valsartan, Toprol XL    CAD (coronary artery disease)  Control risk factors including hypertension, hyperlipidemia, diabetes  Follow-up with cardiologist as scheduled    Need for prophylactic vaccination against diphtheria and tetanus  Needed  Provided Adacel in office    Moderate obesity  Dietary modification and exercise for weight loss  This is been reinforced by Cardiology as well as endocrinology    Mixed hyperlipidemia  Goal LDL less than 70 given his history of heart disease and diabetes  Recently Zetia was added by endocrinologist to his regimen of Crestor    Check lipid profile in 4 months    Drug-induced erectile dysfunction  Erection has improved with Viagra  I believe that the patient could safely tolerate in increased dose of Viagra  Viagra 50 mg p r n  Prescribed          Problem List Items Addressed This Visit        Endocrine    Type 2 diabetes mellitus without complication, without long-term current use of insulin ([facility] )       Lab Results   Component Value Date    HGBA1C 5 9 (H) 10/09/2020     Managed by endocrinologist   Follow-up with endocrinologist as scheduled         Relevant Orders    Hemoglobin A1C       Cardiovascular and Mediastinum    CAD (coronary artery disease) - Primary     Control risk factors including hypertension, hyperlipidemia, diabetes  Follow-up with cardiologist as scheduled         Relevant Medications    metoprolol tartrate (LOPRESSOR) 50 mg tablet    sildenafil (VIAGRA) 50 MG tablet    Other Relevant Orders    CBC and differential    Uric acid    Essential hypertension     Hypertension seems to be well controlled on current medication regimen  Weight loss would be beneficial   Patient is presently on amlodipine/valsartan, Toprol XL         Relevant Medications    metoprolol tartrate (LOPRESSOR) 50 mg tablet    Other Relevant Orders    Uric acid    Persistent atrial fibrillation ([facility] )     Patient is in sinus rhythm  He is on metoprolol and on a decreased dose of Tikosyn  This was confirmed with electrophysiologist   He will be seeing cardiologist   Patient is presently on Eliquis  He does have prior history of drug-eluting stent  Patient is compliant with CPAP  Follow-up with Cardiology as scheduled         Relevant Medications    metoprolol tartrate (LOPRESSOR) 50 mg tablet    sildenafil (VIAGRA) 50 MG tablet    dofetilide (TIKOSYN) 125 mcg capsule    Other Relevant Orders    CBC and differential    Comprehensive metabolic panel    TSH, 3rd generation with Free T4 reflex       Other    Drug-induced erectile dysfunction     Erection has improved with Viagra    I believe that the patient could safely tolerate in increased dose of Viagra  Viagra 50 mg p r n  Prescribed         Relevant Medications    sildenafil (VIAGRA) 50 MG tablet    Mixed hyperlipidemia     Goal LDL less than 70 given his history of heart disease and diabetes  Recently Zetia was added by endocrinologist to his regimen of Crestor  Check lipid profile in 4 months         Relevant Medications    ezetimibe (ZETIA) 10 mg tablet    Other Relevant Orders    Lipid panel    Moderate obesity     Dietary modification and exercise for weight loss  This is been reinforced by Cardiology as well as endocrinology         Relevant Orders    Uric acid    Need for prophylactic vaccination against diphtheria and tetanus     Needed  Provided Adacel in office         Relevant Orders    TDAP VACCINE GREATER THAN OR EQUAL TO 8YO IM (Completed)      Other Visit Diagnoses     Prostate cancer screening        Relevant Orders    PSA, Total Screen          BMI Counseling: Body mass index is 38 84 kg/m²  The BMI is above normal  Nutrition recommendations include moderation in carbohydrate intake, increasing intake of lean protein, reducing intake of saturated fat and trans fat and reducing intake of cholesterol  Exercise recommendations include exercising 3-5 times per week  I have spent 42 minutes with Patient  today in which greater than 50% of this time was spent in counseling/coordination of care regarding Diagnostic results, Prognosis, Risks and benefits of tx options, Intructions for management, Patient and family education, Importance of tx compliance, Risk factor reductions and Impressions

## 2021-04-14 PROBLEM — R94.31 ABNORMAL EKG: Status: RESOLVED | Noted: 2017-12-08 | Resolved: 2021-04-14

## 2021-04-14 NOTE — ASSESSMENT & PLAN NOTE
Control risk factors including hypertension, hyperlipidemia, diabetes    Follow-up with cardiologist as scheduled

## 2021-04-14 NOTE — ASSESSMENT & PLAN NOTE
Erection has improved with Viagra  I believe that the patient could safely tolerate in increased dose of Viagra  Viagra 50 mg p r n   Prescribed

## 2021-04-14 NOTE — ASSESSMENT & PLAN NOTE
Goal LDL less than 70 given his history of heart disease and diabetes  Recently Zetia was added by endocrinologist to his regimen of Crestor    Check lipid profile in 4 months

## 2021-04-14 NOTE — ASSESSMENT & PLAN NOTE
Hypertension seems to be well controlled on current medication regimen    Weight loss would be beneficial   Patient is presently on amlodipine/valsartan, Toprol XL

## 2021-04-14 NOTE — ASSESSMENT & PLAN NOTE
Lab Results   Component Value Date    HGBA1C 5 9 (H) 10/09/2020     Managed by endocrinologist   Arben Lim with endocrinologist as scheduled

## 2021-04-14 NOTE — ASSESSMENT & PLAN NOTE
Dietary modification and exercise for weight loss    This is been reinforced by Cardiology as well as endocrinology

## 2021-05-28 ENCOUNTER — TELEPHONE (OUTPATIENT)
Dept: UROLOGY | Facility: AMBULATORY SURGERY CENTER | Age: 56
End: 2021-05-28

## 2021-05-28 NOTE — TELEPHONE ENCOUNTER
Hello,    Patient is going to be seen on 6/11 at [address] [place] [address] Urology (Q:[id number]) and is in need of a insurance referral   The diagnosis code needed for the visit will be N52 9, and the procedure code needed will be [id number]  Thank you for your help

## 2021-06-11 ENCOUNTER — OFFICE VISIT (OUTPATIENT)
Dept: CARDIOLOGY CLINIC | Facility: CLINIC | Age: 56
End: 2021-06-11
Payer: COMMERCIAL

## 2021-06-11 VITALS
HEIGHT: 67 IN | BODY MASS INDEX: 38.11 KG/M2 | HEART RATE: 86 BPM | OXYGEN SATURATION: 97 % | DIASTOLIC BLOOD PRESSURE: 88 MMHG | WEIGHT: 242.8 LBS | SYSTOLIC BLOOD PRESSURE: 144 MMHG

## 2021-06-11 DIAGNOSIS — I10 ESSENTIAL HYPERTENSION: ICD-10-CM

## 2021-06-11 DIAGNOSIS — G47.33 OBSTRUCTIVE SLEEP APNEA ON CPAP: ICD-10-CM

## 2021-06-11 DIAGNOSIS — I25.10 CORONARY ARTERY DISEASE INVOLVING NATIVE CORONARY ARTERY OF NATIVE HEART WITHOUT ANGINA PECTORIS: Primary | ICD-10-CM

## 2021-06-11 DIAGNOSIS — E11.9 TYPE 2 DIABETES MELLITUS WITHOUT COMPLICATION, WITHOUT LONG-TERM CURRENT USE OF INSULIN (HCC): ICD-10-CM

## 2021-06-11 DIAGNOSIS — N52.2 DRUG-INDUCED ERECTILE DYSFUNCTION: ICD-10-CM

## 2021-06-11 DIAGNOSIS — E78.2 MIXED HYPERLIPIDEMIA: ICD-10-CM

## 2021-06-11 DIAGNOSIS — Z99.89 OBSTRUCTIVE SLEEP APNEA ON CPAP: ICD-10-CM

## 2021-06-11 DIAGNOSIS — I48.19 PERSISTENT ATRIAL FIBRILLATION (HCC): ICD-10-CM

## 2021-06-11 PROCEDURE — 3008F BODY MASS INDEX DOCD: CPT | Performed by: INTERNAL MEDICINE

## 2021-06-11 PROCEDURE — 99214 OFFICE O/P EST MOD 30 MIN: CPT | Performed by: INTERNAL MEDICINE

## 2021-06-11 PROCEDURE — 3079F DIAST BP 80-89 MM HG: CPT | Performed by: INTERNAL MEDICINE

## 2021-06-11 PROCEDURE — 3077F SYST BP >= 140 MM HG: CPT | Performed by: INTERNAL MEDICINE

## 2021-06-11 PROCEDURE — 1036F TOBACCO NON-USER: CPT | Performed by: INTERNAL MEDICINE

## 2021-06-11 RX ORDER — SILDENAFIL 50 MG/1
50 TABLET, FILM COATED ORAL DAILY PRN
Qty: 10 TABLET | Refills: 3 | Status: SHIPPED | OUTPATIENT
Start: 2021-06-11 | End: 2021-06-23 | Stop reason: SDUPTHER

## 2021-06-11 NOTE — PROGRESS NOTES
Cardiology Follow Up    Arlean Boeck [facility]  1965  [id number]  [facility] [place]  [address] [facility] [address]  [address]  [place] [id number] [phone]    1  Coronary artery disease involving native coronary artery of native heart without angina pectoris  Ambulatory referral to Cardiology    VAS screening   2  Essential hypertension  Ambulatory referral to Cardiology    VAS screening   3  Persistent atrial fibrillation (HCC)  VAS screening   4  Mixed hyperlipidemia  VAS screening   5  Obstructive sleep apnea on CPAP  VAS screening   6  Type 2 diabetes mellitus without complication, without long-term current use of insulin (HCC)  VAS screening   7  Drug-induced erectile dysfunction  sildenafil (VIAGRA) 50 MG tablet       Discussion/Summary:Today is my 1st visit with the patient  Coronary disease stable with good functional capacity and no angina  Blood pressure has been well controlled will continue to follow closely  We talked about sodium reduction  He has a history of atrial fibrillation but has been maintaining sinus rhythm after ablation  Continue full anticoagulation  He follows with Dr Hortensia Adler of the electrophysiology Department to manage this  Lipids have been a little on the high side I would like his LDL closer to 70 he was just started on Zetia in addition to Crestor will follow  He also has a history of a rectal dysfunction I have increased sildenafil 50 mg will check a vascular screening he has an appointment upcoming with urology  Interval History:   Very pleasant 53-year-old gentleman with a history of coronary disease and prior PCI of the LAD, hypertension, hyperlipidemia, atrial fibrillation status post ablation presents to establish care with me in the office  Functionally he has been doing well he continues to work as a  he is doing a fair amount of physical activity with no limitations    Specifically denies any chest pain or discomfort, shortness of breath, palpitations, lightheadedness, dizziness, or syncope  He does admit to erectile dysfunction  Denies any claudication symptoms      Problem List     CAD (coronary artery disease)    Erectile dysfunction of non-organic origin    Mixed hyperlipidemia    Essential hypertension    Moderate obesity    Obstructive sleep apnea on CPAP    Type 2 diabetes mellitus without complication, without long-term current use of insulin (HCC)      Lab Results   Component Value Date    HGBA1C 5 9 (H) 10/09/2020         Cervicalgia    Need for prophylactic vaccination against Streptococcus pneumoniae (pneumococcus)    Persistent atrial fibrillation (HCC)    Drug-induced erectile dysfunction    Seborrheic keratosis    Supraspinatus sprain, right, initial encounter    Trapezius strain, right, initial encounter    Need for prophylactic vaccination against diphtheria and tetanus        Past Medical History:   Diagnosis Date    Abnormal EKG 12/8/2017    ACE-inhibitor cough 10/13/2020    Breast mass in male 2/22/2021    CAD (coronary artery disease)     Diabetes ([address] )     Dizziness     Erectile dysfunction     GERD (gastroesophageal reflux disease)     Hyperlipidemia     Hypertension     Shortness of breath      Social History     Socioeconomic History    Marital status: /Civil Union     Spouse name: Not on file    Number of children: Not on file    Years of education: Not on file    Highest education level: Not on file   Occupational History    Not on file   Social Needs    Financial resource strain: Not on file    Food insecurity     Worry: Not on file     Inability: Not on file   [employer] needs     Medical: Not on file     Non-medical: Not on file   Tobacco Use    Smoking status: Never Smoker    Smokeless tobacco: Former User   Substance and Sexual Activity    Alcohol use: No    Drug use: No    Sexual activity: Not on file   Lifestyle    Physical activity     Days per week: Not on file     Minutes per session: Not on file    Stress: Not on file   Relationships    Social connections     Talks on phone: Not on file     Gets together: Not on file     Attends Ten[employer] service: Not on file     Active member of club or organization: Not on file     Attends meetings of clubs or organizations: Not on file     Relationship status: Not on file    Intimate partner violence     Fear of current or ex partner: Not on file     Emotionally abused: Not on file     Physically abused: Not on file     Forced sexual activity: Not on file   Other Topics Concern    Not on file   Social History Narrative    Not on file      Family History   Problem Relation Age of Onset    Coronary artery disease Family     Diabetes Family     No Known Problems Father      Past Surgical History:   Procedure Laterality Date    CARDIAC CATHETERIZATION      ELECTRICAL CARDIOVERSION  2/4/2019            Current Outpatient Medications:     amLODIPine-valsartan (EXFORGE) 5-160 MG per tablet, Take 2 tablets by mouth daily, Disp: 180 tablet, Rfl: 3    apixaban (ELIQUIS) 5 mg, Take 1 tablet (5 mg total) by mouth 2 (two) times a day, Disp: 180 tablet, Rfl: 3    aspirin 81 mg chewable tablet, Chew 1 tablet (81 mg total) daily (Patient taking differently: Chew 162 mg daily ), Disp: 100 tablet, Rfl: 1    cholecalciferol (VITAMIN D3) 1,000 units tablet, Take by mouth daily, Disp: , Rfl:     co-enzyme Q-10 30 MG capsule, Take 30 mg by mouth daily  , Disp: , Rfl:     dofetilide (TIKOSYN) 125 mcg capsule, Take 1 capsule (125 mcg total) by mouth 2 (two) times a day, Disp: 60 capsule, Rfl: 0    ezetimibe (ZETIA) 10 mg tablet, Take 10 mg by mouth daily, Disp: , Rfl:     ivabradine HCl (CORLANOR) 5 MG tablet, Take 2 tablets (10 mg total) by mouth 2 (two) times a day, Disp: 120 tablet, Rfl: 11    metoprolol succinate (TOPROL-XL) 100 mg 24 hr tablet, One and a half tablets twice a day, Disp: 270 tablet, Rfl: 1    metoprolol tartrate (LOPRESSOR) 50 mg tablet, Take 50 mg by mouth 2 (two) times a day, Disp: , Rfl:     ONETOUCH DELICA LANCETS 79B MISC, CHECK BID, Disp: , Rfl: 1    ONETOUCH VERIO test strip, CHECK TWICE DAILY, Disp: , Rfl: 3    OZEMPIC 1 MG/DOSE SOPN, INJECT 1 MG SQ Q WEEK, Disp: , Rfl: 4    rosuvastatin (CRESTOR) 20 MG tablet, Take 1 tablet (20 mg total) by mouth daily, Disp: 90 tablet, Rfl: 1    sildenafil (VIAGRA) 50 MG tablet, Take 1 tablet (50 mg total) by mouth daily as needed for erectile dysfunction, Disp: 10 tablet, Rfl: 3    SYNJARDY XR 12 5-1000 MG TB24, TK 1 T PO BID, Disp: , Rfl: 2  Allergies   Allergen Reactions    Dye [Iodinated Diagnostic Agents] Hives and GI Intolerance     Definity - name of contrast      Aceinhibitors [Ace Inhibitors] Cough       Labs:     Chemistry        Component Value Date/Time    K 4 3 10/09/2020 0759     10/09/2020 0759    CO2 30 10/09/2020 0759    BUN 17 10/09/2020 0759    CREATININE 1 01 10/09/2020 0759    CREATININE 0 88 02/04/2019 0817        Component Value Date/Time    CALCIUM 9 4 10/09/2020 0759    ALKPHOS 80 10/09/2020 0759    AST 15 10/09/2020 0759    ALT 17 10/09/2020 0759            No results found for: CHOL  Lab Results   Component Value Date    HDL 38 (L) 10/09/2020    HDL 35 (L) 09/23/2019     Lab Results   Component Value Date    LDLCALC 92 10/09/2020    LDLCALC 89 09/23/2019     Lab Results   Component Value Date    TRIG 110 10/09/2020    TRIG 78 09/23/2019     No results found for: CHOLHDL    Imaging: No results found  ECG:        ROS    Vitals:    06/11/21 1136   BP: 144/88   Pulse: 86   SpO2: 97%     Vitals:    06/11/21 1136   Weight: 110 kg (242 lb 12 8 oz)     Height: 5' 7" (170 2 cm)   Body mass index is 38 03 kg/m²      Physical Exam:  Vital signs reviewed  General:  Alert and cooperative, appears stated age, no acute distress  HEENT:  PERRLA, EOMI, no scleral icterus, no conjunctival pallor  Neck:  No lymphadenopathy, no thyromegaly, no carotid bruits, no elevated JVP  Heart:  Regular rate and rhythm, normal S1/S2, no S3/S4, no murmur, rubs or gallops  PMI nondisplaced  Lungs:  Clear to auscultation bilaterally, no wheezes rales or rhonchi  Abdomen:  Soft, non-tender, positive bowel sounds, no rebound or guarding,   no organomegaly   Extremities:  Normal range of motion    No clubbing, cyanosis or edema   Vascular:  2+ pedal pulses  Skin:  No rashes or lesions on exposed skin  Neurologic:  Cranial nerves II-XII grossly intact without focal deficits

## 2021-06-23 DIAGNOSIS — N52.2 DRUG-INDUCED ERECTILE DYSFUNCTION: ICD-10-CM

## 2021-06-24 RX ORDER — SILDENAFIL 50 MG/1
50 TABLET, FILM COATED ORAL DAILY PRN
Qty: 10 TABLET | Refills: 3 | Status: SHIPPED | OUTPATIENT
Start: 2021-06-24 | End: 2021-08-26 | Stop reason: SDUPTHER

## 2021-07-30 DIAGNOSIS — I48.19 PERSISTENT ATRIAL FIBRILLATION (HCC): ICD-10-CM

## 2021-07-30 RX ORDER — METOPROLOL SUCCINATE 100 MG/1
TABLET, EXTENDED RELEASE ORAL
Qty: 270 TABLET | Refills: 1 | Status: SHIPPED | OUTPATIENT
Start: 2021-07-30 | End: 2021-09-22

## 2021-08-17 ENCOUNTER — HOSPITAL ENCOUNTER (OUTPATIENT)
Dept: NON INVASIVE DIAGNOSTICS | Facility: CLINIC | Age: 56
Discharge: HOME | End: 2021-08-17
Payer: COMMERCIAL

## 2021-08-17 DIAGNOSIS — I10 ESSENTIAL HYPERTENSION: ICD-10-CM

## 2021-08-17 DIAGNOSIS — I25.10 CORONARY ARTERY DISEASE INVOLVING NATIVE CORONARY ARTERY OF NATIVE HEART WITHOUT ANGINA PECTORIS: ICD-10-CM

## 2021-08-17 DIAGNOSIS — E78.2 MIXED HYPERLIPIDEMIA: ICD-10-CM

## 2021-08-17 DIAGNOSIS — I48.19 PERSISTENT ATRIAL FIBRILLATION (HCC): ICD-10-CM

## 2021-08-17 DIAGNOSIS — G47.33 OBSTRUCTIVE SLEEP APNEA ON CPAP: ICD-10-CM

## 2021-08-17 DIAGNOSIS — E11.9 TYPE 2 DIABETES MELLITUS WITHOUT COMPLICATION, WITHOUT LONG-TERM CURRENT USE OF INSULIN (HCC): ICD-10-CM

## 2021-08-17 DIAGNOSIS — Z99.89 OBSTRUCTIVE SLEEP APNEA ON CPAP: ICD-10-CM

## 2021-08-17 PROCEDURE — VASC: Performed by: SURGERY

## 2021-08-17 PROCEDURE — 93922 UPR/L XTREMITY ART 2 LEVELS: CPT

## 2021-08-24 DIAGNOSIS — I48.19 PERSISTENT ATRIAL FIBRILLATION (HCC): ICD-10-CM

## 2021-08-24 RX ORDER — DOFETILIDE 0.12 MG/1
CAPSULE ORAL
Qty: 180 CAPSULE | Refills: 3 | Status: SHIPPED | OUTPATIENT
Start: 2021-08-24

## 2021-08-25 LAB
BASOPHILS # BLD AUTO: 31 CELLS/UL (ref 0–200)
BASOPHILS NFR BLD AUTO: 0.4 %
EOSINOPHIL # BLD AUTO: 164 CELLS/UL (ref 15–500)
EOSINOPHIL NFR BLD AUTO: 2.1 %
ERYTHROCYTE [DISTWIDTH] IN BLOOD BY AUTOMATED COUNT: 13.1 % (ref 11–15)
HCT VFR BLD AUTO: 44.6 % (ref 38.5–50)
HGB BLD-MCNC: 15.3 G/DL (ref 13.2–17.1)
LYMPHOCYTES # BLD AUTO: 1778 CELLS/UL (ref 850–3900)
LYMPHOCYTES NFR BLD AUTO: 22.8 %
MCH RBC QN AUTO: 28.7 PG (ref 27–33)
MCHC RBC AUTO-ENTMCNC: 34.3 G/DL (ref 32–36)
MCV RBC AUTO: 83.7 FL (ref 80–100)
MONOCYTES # BLD AUTO: 764 CELLS/UL (ref 200–950)
MONOCYTES NFR BLD AUTO: 9.8 %
NEUTROPHILS # BLD AUTO: 5062 CELLS/UL (ref 1500–7800)
NEUTROPHILS NFR BLD AUTO: 64.9 %
PLATELET # BLD AUTO: 182 THOUSAND/UL (ref 140–400)
PMV BLD REES-ECKER: 11.8 FL (ref 7.5–12.5)
PSA SERPL-MCNC: 0.5 NG/ML
RBC # BLD AUTO: 5.33 MILLION/UL (ref 4.2–5.8)
TSH SERPL-ACNC: 2.18 MIU/L (ref 0.4–4.5)
URATE SERPL-MCNC: 3.8 MG/DL (ref 4–8)
WBC # BLD AUTO: 7.8 THOUSAND/UL (ref 3.8–10.8)

## 2021-08-26 ENCOUNTER — OFFICE VISIT (OUTPATIENT)
Dept: FAMILY MEDICINE CLINIC | Facility: CLINIC | Age: 56
End: 2021-08-26
Payer: COMMERCIAL

## 2021-08-26 VITALS
HEIGHT: 67 IN | BODY MASS INDEX: 37.2 KG/M2 | OXYGEN SATURATION: 99 % | HEART RATE: 80 BPM | DIASTOLIC BLOOD PRESSURE: 74 MMHG | SYSTOLIC BLOOD PRESSURE: 132 MMHG | WEIGHT: 237 LBS | RESPIRATION RATE: 16 BRPM

## 2021-08-26 DIAGNOSIS — E78.2 MIXED HYPERLIPIDEMIA: ICD-10-CM

## 2021-08-26 DIAGNOSIS — N52.2 DRUG-INDUCED ERECTILE DYSFUNCTION: ICD-10-CM

## 2021-08-26 DIAGNOSIS — I48.19 PERSISTENT ATRIAL FIBRILLATION (HCC): ICD-10-CM

## 2021-08-26 DIAGNOSIS — I25.10 CORONARY ARTERY DISEASE INVOLVING NATIVE CORONARY ARTERY OF NATIVE HEART WITHOUT ANGINA PECTORIS: ICD-10-CM

## 2021-08-26 DIAGNOSIS — E11.9 TYPE 2 DIABETES MELLITUS WITHOUT COMPLICATION, WITHOUT LONG-TERM CURRENT USE OF INSULIN (HCC): Primary | ICD-10-CM

## 2021-08-26 DIAGNOSIS — I10 ESSENTIAL HYPERTENSION: ICD-10-CM

## 2021-08-26 DIAGNOSIS — H61.23 BILATERAL IMPACTED CERUMEN: ICD-10-CM

## 2021-08-26 DIAGNOSIS — R35.1 NOCTURIA: ICD-10-CM

## 2021-08-26 PROCEDURE — 3725F SCREEN DEPRESSION PERFORMED: CPT | Performed by: FAMILY MEDICINE

## 2021-08-26 PROCEDURE — 3075F SYST BP GE 130 - 139MM HG: CPT | Performed by: FAMILY MEDICINE

## 2021-08-26 PROCEDURE — 99215 OFFICE O/P EST HI 40 MIN: CPT | Performed by: FAMILY MEDICINE

## 2021-08-26 PROCEDURE — 3008F BODY MASS INDEX DOCD: CPT | Performed by: FAMILY MEDICINE

## 2021-08-26 PROCEDURE — 69210 REMOVE IMPACTED EAR WAX UNI: CPT | Performed by: FAMILY MEDICINE

## 2021-08-26 PROCEDURE — 1036F TOBACCO NON-USER: CPT | Performed by: FAMILY MEDICINE

## 2021-08-26 PROCEDURE — 3078F DIAST BP <80 MM HG: CPT | Performed by: FAMILY MEDICINE

## 2021-08-26 RX ORDER — ROSUVASTATIN CALCIUM 20 MG/1
20 TABLET, COATED ORAL DAILY
Qty: 90 TABLET | Refills: 1 | Status: SHIPPED | OUTPATIENT
Start: 2021-08-26

## 2021-08-26 RX ORDER — SILDENAFIL 25 MG/1
25 TABLET, FILM COATED ORAL DAILY PRN
Qty: 20 TABLET | Refills: 1 | Status: SHIPPED | OUTPATIENT
Start: 2021-08-26 | End: 2022-07-20 | Stop reason: SDUPTHER

## 2021-08-26 NOTE — ASSESSMENT & PLAN NOTE
Lab Results   Component Value Date    HGBA1C 5 9 (H) 10/09/2020     Managed by endocrinology  Follow-up with endocrinologist as scheduled    Hemoglobin A1c of 5 9% is excellent

## 2021-08-26 NOTE — ASSESSMENT & PLAN NOTE
Goal LDL less than 70  Remains Crestor and Zetia  Follow-up cardiology and Endocrinology    Repeat profile to done in 6 months

## 2021-08-26 NOTE — PROGRESS NOTES
Subjective:      Patient ID: Daria Frey is a 49 y o  male  68-year-old male with past medical history of coronary artery disease, diabetes mellitus type 2, hypertension, hyperlipidemia presents with his wife for follow-up of chronic conditions  Patient did have follow-up with Cardiology  Hemoglobin A1c was performed for Cardiology which was stable at 5 9%  Cholesterol was also done for specialist   Patient overall is feeling well  More physically active  He has lost weight  Has received COVID-19 vaccination  Labs reviewed which showed normal TSH, CBC, CMP, PSA of 0 5  Patient does complain of nocturia 3-4 times per night  He was supposed to establish with Urology in June but the urologist was cold out for an emergency and he could not make a follow-up appointment  Needs referral back to Urology  Sometimes his stream is good and other times it is week  Patient was given increased dose of Viagra because 25 mg was not always working consistently  50 mg seem to do much better as far as efficacy but his insurance will only cover 25 mg tablets so he doubles      Past Medical History:   Diagnosis Date    Abnormal EKG 12/8/2017    ACE-inhibitor cough 10/13/2020    Breast mass in male 2/22/2021    CAD (coronary artery disease)     Diabetes ([facility] [address] )     Dizziness     Erectile dysfunction     GERD (gastroesophageal reflux disease)     Hyperlipidemia     Hypertension     Shortness of breath        Family History   Problem Relation Age of Onset    Coronary artery disease Family     Diabetes Family     No Known Problems Father        Past Surgical History:   Procedure Laterality Date    CARDIAC CATHETERIZATION      ELECTRICAL CARDIOVERSION  2/4/2019             reports that he has never smoked  He quit smokeless tobacco use about 6 years ago  He reports that he does not drink alcohol and does not use drugs        Current Outpatient Medications:     amLODIPine-valsartan (EXFORGE) 5-160 MG per tablet, Take 2 tablets by mouth daily, Disp: 180 tablet, Rfl: 3    apixaban (ELIQUIS) 5 mg, Take 1 tablet (5 mg total) by mouth 2 (two) times a day, Disp: 180 tablet, Rfl: 3    aspirin 81 mg chewable tablet, Chew 1 tablet (81 mg total) daily (Patient taking differently: Chew 162 mg daily ), Disp: 100 tablet, Rfl: 1    cholecalciferol (VITAMIN D3) 1,000 units tablet, Take by mouth daily, Disp: , Rfl:     co-enzyme Q-10 30 MG capsule, Take 30 mg by mouth daily  , Disp: , Rfl:     dofetilide (TIKOSYN) 125 mcg capsule, TAKE 4 CAPSULES(500 MCG) BY MOUTH TWICE DAILY, Disp: 180 capsule, Rfl: 3    ezetimibe (ZETIA) 10 mg tablet, Take 10 mg by mouth daily, Disp: , Rfl:     metoprolol succinate (TOPROL-XL) 100 mg 24 hr tablet, TAKE 1 AND 1/2 TABLETS BY MOUTH TWICE DAILY, Disp: 270 tablet, Rfl: 1    ONETOUCH DELICA LANCETS 59L MISC, CHECK BID, Disp: , Rfl: 1    ONETOUCH VERIO test strip, CHECK TWICE DAILY, Disp: , Rfl: 3    OZEMPIC 1 MG/DOSE SOPN, INJECT 1 MG SQ Q WEEK, Disp: , Rfl: 4    rosuvastatin (CRESTOR) 20 MG tablet, Take 1 tablet (20 mg total) by mouth daily, Disp: 90 tablet, Rfl: 1    SYNJARDY XR 12 5-1000 MG TB24, TK 1 T PO BID, Disp: , Rfl: 2    ivabradine HCl (CORLANOR) 5 MG tablet, Take 2 tablets (10 mg total) by mouth 2 (two) times a day, Disp: 120 tablet, Rfl: 11    metoprolol tartrate (LOPRESSOR) 50 mg tablet, Take 50 mg by mouth 2 (two) times a day (Patient not taking: Reported on 8/26/2021), Disp: , Rfl:     sildenafil (VIAGRA) 25 MG tablet, Take 1 tablet (25 mg total) by mouth daily as needed for erectile dysfunction, Disp: 20 tablet, Rfl: 1    The following portions of the patient's history were reviewed and updated as appropriate: allergies, current medications, past family history, past medical history, past social history, past surgical history and problem list     Review of Systems   Constitutional: Negative  HENT: Positive for hearing loss  Eyes: Negative  Respiratory: Negative  Cardiovascular: Negative  Gastrointestinal: Negative  Endocrine: Negative  Genitourinary: Positive for frequency (Nocturia 3-4 times per night)  Erectile dysfunction   Musculoskeletal: Negative  Skin: Negative  Allergic/Immunologic: Negative  Neurological: Negative  Hematological: Negative  Psychiatric/Behavioral: Negative  All other systems reviewed and are negative  Objective:    /74   Pulse 80   Resp 16   Ht 5' 7" (1 702 m)   Wt 108 kg (237 lb)   SpO2 99%   BMI 37 12 kg/m²      Physical Exam  Vitals and nursing note reviewed  Constitutional:       General: He is not in acute distress  Appearance: Normal appearance  He is well-developed  He is obese  He is not ill-appearing  HENT:      Head: Normocephalic and atraumatic  Right Ear: Tympanic membrane, ear canal and external ear normal  There is impacted cerumen  Left Ear: Tympanic membrane, ear canal and external ear normal  There is impacted cerumen  Eyes:      Extraocular Movements: Extraocular movements intact  Conjunctiva/sclera: Conjunctivae normal       Pupils: Pupils are equal, round, and reactive to light  Cardiovascular:      Rate and Rhythm: Normal rate and regular rhythm  Pulses: Normal pulses  Heart sounds: Normal heart sounds  No murmur heard  Pulmonary:      Effort: Pulmonary effort is normal       Breath sounds: Normal breath sounds  Abdominal:      General: Abdomen is flat  Bowel sounds are normal       Palpations: Abdomen is soft  Musculoskeletal:         General: Normal range of motion  Cervical back: Normal range of motion and neck supple  Skin:     General: Skin is warm and dry  Neurological:      General: No focal deficit present  Mental Status: He is alert and oriented to person, place, and time  Psychiatric:         Mood and Affect: Mood normal          Behavior: Behavior normal          Thought Content:  Thought content normal          Judgment: Judgment normal            Recent Results (from the past 1008 hour(s))   Uric acid    Collection Time: 08/25/21  8:44 AM   Result Value Ref Range    Uric Acid 3 8 (L) 4 0 - 8 0 mg/dL   CBC and differential    Collection Time: 08/25/21  8:44 AM   Result Value Ref Range    White Blood Cell Count 7 8 3 8 - 10 8 Thousand/uL    Red Blood Cell Count 5 33 4 20 - 5 80 Million/uL    Hemoglobin 15 3 13 2 - 17 1 g/dL    HCT 44 6 38 5 - 50 0 %    MCV 83 7 80 0 - 100 0 fL    MCH 28 7 27 0 - 33 0 pg    MCHC 34 3 32 0 - 36 0 g/dL    RDW 13 1 11 0 - 15 0 %    Platelet Count 510 843 - 400 Thousand/uL    SL AMB MPV 11 8 7 5 - 12 5 fL    Neutrophils (Absolute) 5,062 1,500 - 7,800 cells/uL    Lymphocytes (Absolute) 1,778 850 - 3,900 cells/uL    Monocytes (Absolute) 764 200 - 950 cells/uL    Eosinophils (Absolute) 164 15 - 500 cells/uL    Basophils ABS 31 0 - 200 cells/uL    Neutrophils 64 9 %    Lymphocytes 22 8 %    Monocytes 9 8 %    Eosinophils 2 1 %    Basophils PCT 0 4 %   PSA, Total Screen    Collection Time: 08/25/21  8:44 AM   Result Value Ref Range    Prostate Specific Antigen Total 0 5 < OR = 4 0 ng/mL   TSH, 3rd generation with Free T4 reflex    Collection Time: 08/25/21  8:44 AM   Result Value Ref Range    TSH W/RFX TO FREE T4 2 18 0 40 - 4 50 mIU/L       Assessment/Plan:    Type 2 diabetes mellitus without complication, without long-term current use of insulin ([facility])    Lab Results   Component Value Date    HGBA1C 5 9 (H) 10/09/2020     Managed by endocrinology  Follow-up with endocrinologist as scheduled  Hemoglobin A1c of 5 9% is excellent    CAD (coronary artery disease)  Follow-up with Cardiology  Continue to monitor her thus factors including hypertension hyperlipidemia, that    Essential hypertension  Controlled on amlodipine/valsartan and Toprol  Follow-up with Cardiology    Persistent atrial fibrillation ([address] )  Seems to be in sinus rhythm right now    Patient remains on Tikosyn and Eliquis    Mixed hyperlipidemia  Goal LDL less than 70  Remains Crestor and Zetia  Follow-up cardiology and Endocrinology  Repeat profile to done in 6 months     Drug-induced erectile dysfunction  Viagra has been effective  Increased dose of Viagra to 50 mg but he will have to take 225 mg tablets    Nocturia  Normal PSA  Referral to Urology    Bilateral impacted cerumen  Successfully removed as per procedure    -advised use over-the-counter Debrox or mineral oil to prevent cerumen buildup          Problem List Items Addressed This Visit        Endocrine    Type 2 diabetes mellitus without complication, without long-term current use of insulin ([facility] ) - Primary       Lab Results   Component Value Date    HGBA1C 5 9 (H) 10/09/2020     Managed by endocrinology  Follow-up with endocrinologist as scheduled  Hemoglobin A1c of 5 9% is excellent         Relevant Orders    Hemoglobin A1C    Vitamin D 1,25 dihydroxy    Microalbumin / creatinine urine ratio       Cardiovascular and Mediastinum    CAD (coronary artery disease)     Follow-up with Cardiology  Continue to monitor her thus factors including hypertension hyperlipidemia, that         Relevant Medications    sildenafil (VIAGRA) 25 MG tablet    Other Relevant Orders    CBC and differential    TSH, 3rd generation with Free T4 reflex    Vitamin D 1,25 dihydroxy    Essential hypertension     Controlled on amlodipine/valsartan and Toprol  Follow-up with Cardiology         Persistent atrial fibrillation ([facility] [address] )     Seems to be in sinus rhythm right now    Patient remains on Tikosyn and Eliquis         Relevant Medications    sildenafil (VIAGRA) 25 MG tablet    Other Relevant Orders    TSH, 3rd generation with Free T4 reflex    Vitamin D 1,25 dihydroxy       Nervous and Auditory    Bilateral impacted cerumen     Successfully removed as per procedure    -advised use over-the-counter Debrox or mineral oil to prevent cerumen buildup            Other    Drug-induced erectile dysfunction     Viagra has been effective  Increased dose of Viagra to 50 mg but he will have to take 225 mg tablets         Relevant Medications    sildenafil (VIAGRA) 25 MG tablet    Other Relevant Orders    Ambulatory referral to Urology    Vitamin D 1,25 dihydroxy    Mixed hyperlipidemia     Goal LDL less than 70  Remains Crestor and Zetia  Follow-up cardiology and Endocrinology  Repeat profile to done in 6 months          Relevant Medications    rosuvastatin (CRESTOR) 20 MG tablet    Other Relevant Orders    Comprehensive metabolic panel    Lipid panel    Vitamin D 1,25 dihydroxy    Nocturia     Normal PSA  Referral to Urology         Relevant Orders    Vitamin D 1,25 dihydroxy            Ear cerumen removal    Date/Time: 8/26/2021 10:20 AM  Performed by: Mat Hoang DO  Authorized by: Mat Hoang DO   Universal Protocol:  Consent: Verbal consent obtained  Consent given by: patient  Patient understanding: patient states understanding of the procedure being performed  Patient identity confirmed: verbally with patient      Patient location:  Clinic  Procedure details:     Local anesthetic:  None    Location:  L ear and R ear    Procedure type: irrigation with instrumentation      Instrumentation: curette      Approach:  External  Post-procedure details:     Complication:  None    Hearing quality:  Improved    Patient tolerance of procedure:   Tolerated well, no immediate complications

## 2021-08-26 NOTE — ASSESSMENT & PLAN NOTE
Viagra has been effective    Increased dose of Viagra to 50 mg but he will have to take 225 mg tablets

## 2021-08-26 NOTE — ASSESSMENT & PLAN NOTE
Successfully removed as per procedure    -advised use over-the-counter Debrox or mineral oil to prevent cerumen buildup

## 2021-08-26 NOTE — ASSESSMENT & PLAN NOTE
Follow-up with Cardiology    Continue to monitor her thus factors including hypertension hyperlipidemia, that

## 2021-09-09 ENCOUNTER — TELEPHONE (OUTPATIENT)
Dept: UROLOGY | Facility: AMBULATORY SURGERY CENTER | Age: 56
End: 2021-09-09

## 2021-09-09 DIAGNOSIS — R00.0 INAPPROPRIATE SINUS TACHYCARDIA: ICD-10-CM

## 2021-09-09 NOTE — TELEPHONE ENCOUNTER
Hello,     Patient is going to be seen on  at [address] [place] [address] [place] and is in need of a insurance referral   The diagnosis code needed for the visit will be N52 9, and the procedure code needed will be [id number]  Thank you for your help

## 2021-09-14 ENCOUNTER — TELEPHONE (OUTPATIENT)
Dept: CARDIOLOGY CLINIC | Facility: CLINIC | Age: 56
End: 2021-09-14

## 2021-09-14 NOTE — TELEPHONE ENCOUNTER
Dominik Moore,   This patient's Arthea Angelucci is no longer covered by is insurance  He is calling to look for an alternative

## 2021-09-15 NOTE — TELEPHONE ENCOUNTER
Hi Dr Cristóbal Mcleod,   Patient called back and confirmed that it is his Corlanor that is no longer covered  He spoke with someone from [employer] who gave him a list of alternatives that will be covered under his insurance  Atenolol  Bisoprolol  Carvedilol  Propanolol    Also, would you like him to continue his Tikosyn?

## 2021-09-15 NOTE — TELEPHONE ENCOUNTER
In the patient's message, he mentioned Corlanor  I called the patient back and left a message to verify  Would you like me to tell him to stop his Tikosyn?

## 2021-09-15 NOTE — TELEPHONE ENCOUNTER
Instructed patient to stop his corlanor when he runs out per Dr Jani Carty  He states he should have enough to get him through to next week  Dr Jani Carty, Mr Carine Cooney has an upcoming appt with you on 09/22  Would you like to keep this appt or do you want us to push it out a month?

## 2021-09-16 NOTE — TELEPHONE ENCOUNTER
Okay, I instructed patient to stop his Corlanor and told him we'll assess progress next week  Thanks Dr Bautista Garcia!

## 2021-09-22 ENCOUNTER — OFFICE VISIT (OUTPATIENT)
Dept: CARDIOLOGY CLINIC | Facility: CLINIC | Age: 56
End: 2021-09-22
Payer: COMMERCIAL

## 2021-09-22 VITALS
SYSTOLIC BLOOD PRESSURE: 132 MMHG | HEIGHT: 67 IN | HEART RATE: 90 BPM | BODY MASS INDEX: 37.54 KG/M2 | WEIGHT: 239.2 LBS | RESPIRATION RATE: 16 BRPM | DIASTOLIC BLOOD PRESSURE: 90 MMHG

## 2021-09-22 DIAGNOSIS — I10 ESSENTIAL HYPERTENSION: ICD-10-CM

## 2021-09-22 DIAGNOSIS — I48.19 PERSISTENT ATRIAL FIBRILLATION (HCC): Primary | ICD-10-CM

## 2021-09-22 PROCEDURE — 4010F ACE/ARB THERAPY RXD/TAKEN: CPT | Performed by: INTERNAL MEDICINE

## 2021-09-22 PROCEDURE — 3008F BODY MASS INDEX DOCD: CPT | Performed by: INTERNAL MEDICINE

## 2021-09-22 PROCEDURE — 3075F SYST BP GE 130 - 139MM HG: CPT | Performed by: INTERNAL MEDICINE

## 2021-09-22 PROCEDURE — 1036F TOBACCO NON-USER: CPT | Performed by: INTERNAL MEDICINE

## 2021-09-22 PROCEDURE — 3080F DIAST BP >= 90 MM HG: CPT | Performed by: INTERNAL MEDICINE

## 2021-09-22 PROCEDURE — 99214 OFFICE O/P EST MOD 30 MIN: CPT | Performed by: INTERNAL MEDICINE

## 2021-09-22 PROCEDURE — 93000 ELECTROCARDIOGRAM COMPLETE: CPT | Performed by: INTERNAL MEDICINE

## 2021-09-22 RX ORDER — METOPROLOL SUCCINATE 200 MG/1
200 TABLET, EXTENDED RELEASE ORAL 2 TIMES DAILY
Qty: 180 TABLET | Refills: 3 | Status: SHIPPED | OUTPATIENT
Start: 2021-09-22 | End: 2022-07-26 | Stop reason: SDUPTHER

## 2021-09-22 RX ORDER — VALSARTAN 160 MG/1
160 TABLET ORAL DAILY
Qty: 180 TABLET | Refills: 3 | Status: SHIPPED | OUTPATIENT
Start: 2021-09-22

## 2021-09-22 NOTE — PROGRESS NOTES
HEART AND VASCULAR  CARDIAC ELECTROPHYSIOLOGY   HEART RHYTHM CENTER  [facility]    Outpatient  Follow-up  Today's Date: 09/22/21        Patient name: Meghana Jeter  YOB: 1965  Sex: male         Chief Complaint: f/u afib      ASSESSMENT:  Problem List Items Addressed This Visit        Cardiovascular and Mediastinum    Essential hypertension    Relevant Medications    valsartan (DIOVAN) 160 mg tablet    metoprolol succinate (TOPROL-XL) 200 MG 24 hr tablet    Persistent atrial fibrillation (HCC) - Primary    Relevant Medications    metoprolol succinate (TOPROL-XL) 200 MG 24 hr tablet    Other Relevant Orders    POCT ECG        1) Persistent afib, in NSR x 2 years after ablation/tikosyn  Feels good  CHADS2Vasc=3 and on Eliquis and tikosyn 125mcg bid    2) H/o Systolic chf EF 53% improved 55% (jan 20210 after rhythm control, 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  Was on corlanor but no longer covered  HR is 90bpm today in NSR    3) Stabl eCAD h/o PCI to LAD 2015    4) HTN mildly elevated sicne we stopped spirolactone (had gynecomastia from this)    5) NORA on CPAP    6) obesity- he has lost a lot of weight w diet oracio exercise    7) T2DM      PLAN:  1  Increase toprol XL 200mg bid to drive down HR  2  Stop amlodipine, cont valsartan (will stop Exforge combo pill to get rid of norvasc)  3  Cont eliquis/tikosyn      Follow up in: 6 months    Orders Placed This Encounter   Procedures    POCT ECG     Medications Discontinued During This Encounter   Medication Reason    metoprolol tartrate (LOPRESSOR) 50 mg tablet Alternate therapy    ivabradine HCl (CORLANOR) 5 MG tablet     amLODIPine-valsartan (EXFORGE) 5-160 MG per tablet     metoprolol succinate (TOPROL-XL) 100 mg 24 hr tablet              HPI/Subjective:   1) Persistent afib, in NSR x 2 years after ablation/tikosyn  Feels good  CHADS2Vasc=3 and on Eliquis and tikosyn 125mcg bid    2) H/o Systolic chf EF 28% improved 55% (jan 20210 after rhythm control, 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  Was on corlanor but no longer covered  HR is 90bpm today in NSR    3) Stabl eCAD h/o PCI to LAD 2015    4) HTN mildly elevated sicne we stopped spirolactone (had gynecomastia from this)    5) NORA on CPAP    6) obesity- he has lost a lot of weight w diet oracio exercise    7) T2DM        Ashley Escobar is doing well, feels good  No edema  No Cp/sob  No recurrence of afib he is aware of  Please note HPI is listed by problem with with update following it, it is copied again in the assessment above and reflects medical decision making as well  Complete 12 point ROS reviewed and otherwise non pertinent or negative except as per HPI pertinent positives in Cardiovascular and Respiratory emphasized  Please see paper chart for outpatient clinic patients where the patient completed the 12 point ROS survey  Past Medical History:   Diagnosis Date    Abnormal EKG 12/8/2017    ACE-inhibitor cough 10/13/2020    Breast mass in male 2/22/2021    CAD (coronary artery disease)     Diabetes (HCC)     Dizziness     Erectile dysfunction     GERD (gastroesophageal reflux disease)     Hyperlipidemia     Hypertension     Shortness of breath        Allergies   Allergen Reactions    Dye [Iodinated Diagnostic Agents] Hives and GI Intolerance     Definity - name of contrast      Aceinhibitors [Ace Inhibitors] Cough     I reviewed the Home Medication list and Allergies in the chart     Scheduled Meds:  Current Outpatient Medications   Medication Sig Dispense Refill    apixaban (ELIQUIS) 5 mg Take 1 tablet (5 mg total) by mouth 2 (two) times a day 180 tablet 3    aspirin 81 mg chewable tablet Chew 1 tablet (81 mg total) daily 100 tablet 1    cholecalciferol (VITAMIN D3) 1,000 units tablet Take by mouth daily      co-enzyme Q-10 30 MG capsule Take 30 mg by mouth daily        dofetilide (TIKOSYN) 125 mcg capsule TAKE 4 CAPSULES(500 MCG) BY MOUTH TWICE DAILY (Patient taking differently: Take 125 mcg by mouth 2 (two) times a day One capsule twice daily) 180 capsule 3    ezetimibe (ZETIA) 10 mg tablet Take 10 mg by mouth daily      metoprolol succinate (TOPROL-XL) 200 MG 24 hr tablet Take 1 tablet (200 mg total) by mouth 2 (two) times a day 180 tablet 3    ONETOUCH DELICA LANCETS 88Y MISC CHECK BID  1    ONETOUCH VERIO test strip CHECK TWICE DAILY  3    OZEMPIC 1 MG/DOSE SOPN INJECT 1 MG SQ Q WEEK  4    rosuvastatin (CRESTOR) 20 MG tablet Take 1 tablet (20 mg total) by mouth daily 90 tablet 1    sildenafil (VIAGRA) 25 MG tablet Take 1 tablet (25 mg total) by mouth daily as needed for erectile dysfunction 20 tablet 1    SYNJARDY XR 12 5-1000 MG TB24 TK 1 T PO BID  2    valsartan (DIOVAN) 160 mg tablet Take 1 tablet (160 mg total) by mouth daily 180 tablet 3     No current facility-administered medications for this visit       PRN Meds:         Family History   Problem Relation Age of Onset    Coronary artery disease Family     Diabetes Family     No Known Problems Father        Social History     Socioeconomic History    Marital status: /Civil Union     Spouse name: Not on file    Number of children: Not on file    Years of education: Not on file    Highest education level: Not on file   Occupational History    Not on file   Tobacco Use    Smoking status: Never Smoker    Smokeless tobacco: Former User   Vaping Use    Vaping Use: Never used   Substance and Sexual Activity    Alcohol use: No    Drug use: No    Sexual activity: Not on file   Other Topics Concern    Not on file   Social History Narrative    Not on file     Social Determinants of Health     Financial Resource Strain:     Difficulty of Paying Living Expenses:    Food Insecurity:     Worried About Running Out of Food in the Last Year:     [address] in the Last Year:    Transportation Needs:     Lack of Transportation (Medical):  Lack of Transportation (Non-Medical):    Physical Activity:     Days of Exercise per Week:     Minutes of Exercise per Session:    Stress:     Feeling of Stress :    Social Connections:     Frequency of Communication with Friends and Family:     Frequency of Social Gatherings with Friends and Family:     Attends [employer] Services:     Active Member of Clubs or Organizations:     Attends Club or Organization Meetings:     Marital Status:    Intimate Partner Violence:     Fear of Current or Ex-Partner:     Emotionally Abused:     Physically Abused:     Sexually Abused:          OBJECTIVE:    /90   Pulse 90   Resp 16   Ht 5' 7" (1 702 m)   Wt 109 kg (239 lb 3 2 oz)   BMI 37 46 kg/m²   Vitals:    09/22/21 1557   Weight: 109 kg (239 lb 3 2 oz)     /90   Pulse 90   Resp 16   Ht 5' 7" (1 702 m)   Wt 109 kg (239 lb 3 2 oz)   BMI 37 46 kg/m²   Vitals:    09/22/21 1557   Weight: 109 kg (239 lb 3 2 oz)     GEN: No acute distress, Alert and oriented, well appearing  HEENT:External ears normal, wearing a mask  EYES: Pupils equal, sclera anicteric, midline, normal conjuctiva  NECK: No JVD, supple, no obvious masses or thryomegaly or goiter  CARDIOVASCULAR:  RRR, No murmur, rub, gallops S1,S2  LUNGS: Clear To auscultation bilaterally, normal effort, no rales, rhonchi, crackles   ABDOMEN:  nondistended,  without obvious organomegaly or ascites  EXTREMITIES/VASCULAR:  No edema  warm an well perfused  PSYCH: Normal Affect,  linear speech pattern without evidence of psychosis  NEURO: Grossly intact, moving all extremiteis equal, face symmetric, alert and responsive, no obvious focal defecits   GAIT:  Ambulates normally without difficulty  HEME: No bleeding, bruising, petechia, purpura   SKIN: No significant rashes on visibile skin, warm, no diaphoresis or pallor         Lab Results:       LABS:      Chemistry        Component Value Date/Time    K 4 3 10/09/2020 0759     10/09/2020 0759    CO2 30 10/09/2020 0759    BUN 17 10/09/2020 0759    CREATININE 1 01 10/09/2020 0759    CREATININE 0 88 02/04/2019 0817        Component Value Date/Time    CALCIUM 9 4 10/09/2020 0759    ALKPHOS 80 10/09/2020 0759    AST 15 10/09/2020 0759    ALT 17 10/09/2020 0759            No results found for: CHOL  Lab Results   Component Value Date    HDL 38 (L) 10/09/2020    HDL 35 (L) 09/23/2019     Lab Results   Component Value Date    LDLCALC 92 10/09/2020    LDLCALC 89 09/23/2019     Lab Results   Component Value Date    TRIG 110 10/09/2020    TRIG 78 09/23/2019     No results found for: CHOLHDL    IMAGING: Us Breast Left Limited (diagnostic), Mammo Diagnostic Bilateral W 3d & Cad    Result Date: 3/5/2021  Narrative: DIAGNOSIS: Breast mass in male TECHNIQUE: Digital diagnostic mammography was performed  Computer Aided Detection (CAD) analyzed all applicable images  Ultrasound of the left breast(s) was performed  COMPARISONS: None available  RELEVANT HISTORY: Family Breast Cancer History: No known family history of breast cancer  Family Medical History: No known relevant family medical history  Personal History: No known relevant hormone history  No known relevant surgical history  No known relevant medical history  RISK ASSESSMENT: [facility] reporting was suppressed due to the patient's history and/or demographic factors  TISSUE DENSITY: The breasts are almost entirely fatty  INDICATION: Valentina Powers is a 50 y o  male presenting for evaluation of a tender palpable left breast mass  FINDINGS: Palpable triangle marker over the left breast has moderate subjacent gynecomastia  There is no underlying mass on mammography  Sonographic evaluation left retroareolar breast was performed in the area of palpable tender concern  There is moderate gynecomastia  There is no significant gynecomastia on the right  No suspicious masses in right breast on mammography       Impression: Moderate left gynecomastia accounting for patient's tender palpable concern  Recommend clinical management  ASSESSMENT/BI-RADS CATEGORY: Left: 2 - Benign Right: 1 - Negative Overall: 2 - Benign RECOMMENDATION:      - Clinical management for both breasts  Workstation ID: [id number]        Cardiac testing:   No results found for this or any previous visit  Results for orders placed during the hospital encounter of 19   ELZBIETA    Narrative [address]  [facility], [address] [facility]  [phone]    Transesophageal Echocardiogram  2D, Doppler, and Color Doppler    Study date:  2019    Patient: Yifan Emmanuel  MR number: [id number]  Account number: [de-identified]  : 1965  Age: 48 years  Gender: Male  Status: Inpatient  Location: Echo lab  Height: 67 in  Weight: 235 lb  BP: 117/ 83 mmHg    Indications: Afib    Diagnoses: I48 0 - Atrial fibrillation    Sonographer:  ALEX Lebron  Primary Physician:  Jude Kennedy DO  Referring Physician:  Ynes Hernandez MD  Group:  [facility] [id number] [facility]  Interpreting Physician:  Soha Shen MD    SUMMARY    LEFT VENTRICLE:  The ventricle was moderately dilated  Systolic function was moderately to markedly reduced  Ejection fraction was estimated to be 30 %  LEFT ATRIUM:  The atrium was mildly to moderately dilated  TRICUSPID VALVE:  There was mild regurgitation  HISTORY: PRIOR HISTORY: Afib, CAD, HTN, NORA, DM2, HLD, Former smoker, Cardiomyopathy    PROCEDURE: The procedure was performed in the echo lab  This was a routine study  The risks and alternatives of the procedure were explained to the patient and informed consent was obtained  The transesophageal approach was used  The study  included complete 2D imaging, complete spectral Doppler, and color Doppler  The heart rate was 114 bpm, at the start of the study   An adult omniplane probe was inserted by the attending cardiologist  Flavia Mack with difficulty  Four  intubation attempt(s)  There was no blood detected on the probe  Image quality was adequate  MEDICATIONS: Anesthesia  LEFT VENTRICLE: The ventricle was moderately dilated  Systolic function was moderately to markedly reduced  Ejection fraction was estimated to be 30 %  RIGHT VENTRICLE: The size was normal  Systolic function was normal  Wall thickness was normal     LEFT ATRIUM: The atrium was mildly to moderately dilated  No mass was present  There was no spontaneous echo contrast ("smoke")  APPENDAGE: The size was normal  No thrombus was identified  DOPPLER: The function was mildly reduced (mildly  reduced emptying velocity)  MITRAL VALVE: Valve structure was normal  There was normal leaflet separation  There was no echocardiographic evidence of vegetation  DOPPLER: There was no regurgitation  AORTIC VALVE: The valve was trileaflet  Leaflets exhibited normal thickness and normal cuspal separation  There was no echocardiographic evidence of vegetation  DOPPLER: There was no regurgitation  TRICUSPID VALVE: The valve structure was normal  There was normal leaflet separation  There was no echocardiographic evidence of vegetation  DOPPLER: There was mild regurgitation  PERICARDIUM: There was no pericardial effusion  The pericardium was normal in appearance  [address] Accredited Echocardiography Laboratory    Prepared and electronically signed by    Maine Lara MD  Signed 25-Jan-2019 17:11:44       No results found for this or any previous visit  No results found for this or any previous visit          I reviewed and interpreted the following LABS/EKG/TELE/IMAGING and below is summary of my interpretation (if data available):        Current EKG and Rhythm Strip:NSR,90bpm first degree AV block, LAD, inf infarct attern QTc 494ms

## 2021-10-22 ENCOUNTER — CONSULT (OUTPATIENT)
Dept: UROLOGY | Facility: AMBULATORY SURGERY CENTER | Age: 56
End: 2021-10-22
Payer: COMMERCIAL

## 2021-10-22 VITALS
HEIGHT: 67 IN | WEIGHT: 240 LBS | BODY MASS INDEX: 37.67 KG/M2 | DIASTOLIC BLOOD PRESSURE: 86 MMHG | HEART RATE: 86 BPM | SYSTOLIC BLOOD PRESSURE: 128 MMHG

## 2021-10-22 DIAGNOSIS — N52.1 ERECTILE DYSFUNCTION DUE TO DISEASES CLASSIFIED ELSEWHERE: ICD-10-CM

## 2021-10-22 DIAGNOSIS — N52.2 DRUG-INDUCED ERECTILE DYSFUNCTION: ICD-10-CM

## 2021-10-22 DIAGNOSIS — Z12.5 SCREENING FOR PROSTATE CANCER: ICD-10-CM

## 2021-10-22 DIAGNOSIS — N40.1 BENIGN PROSTATIC HYPERPLASIA WITH URINARY FREQUENCY: Primary | ICD-10-CM

## 2021-10-22 DIAGNOSIS — R35.0 BENIGN PROSTATIC HYPERPLASIA WITH URINARY FREQUENCY: Primary | ICD-10-CM

## 2021-10-22 DIAGNOSIS — N50.812 PAIN IN LEFT TESTICLE: ICD-10-CM

## 2021-10-22 PROCEDURE — 3079F DIAST BP 80-89 MM HG: CPT | Performed by: NURSE PRACTITIONER

## 2021-10-22 PROCEDURE — 3008F BODY MASS INDEX DOCD: CPT | Performed by: NURSE PRACTITIONER

## 2021-10-22 PROCEDURE — 3074F SYST BP LT 130 MM HG: CPT | Performed by: NURSE PRACTITIONER

## 2021-10-22 PROCEDURE — 99244 OFF/OP CNSLTJ NEW/EST MOD 40: CPT | Performed by: NURSE PRACTITIONER

## 2021-10-22 PROCEDURE — 1036F TOBACCO NON-USER: CPT | Performed by: NURSE PRACTITIONER

## 2021-10-22 RX ORDER — TAMSULOSIN HYDROCHLORIDE 0.4 MG/1
0.4 CAPSULE ORAL
Qty: 30 CAPSULE | Refills: 3 | Status: SHIPPED | OUTPATIENT
Start: 2021-10-22

## 2021-10-28 ENCOUNTER — VBI (OUTPATIENT)
Dept: ADMINISTRATIVE | Facility: OTHER | Age: 56
End: 2021-10-28

## 2021-12-03 ENCOUNTER — TELEPHONE (OUTPATIENT)
Dept: FAMILY MEDICINE CLINIC | Facility: OTHER | Age: 56
End: 2021-12-03

## 2021-12-07 ENCOUNTER — OFFICE VISIT (OUTPATIENT)
Dept: SLEEP CENTER | Facility: CLINIC | Age: 56
End: 2021-12-07
Payer: COMMERCIAL

## 2021-12-07 VITALS
BODY MASS INDEX: 37.39 KG/M2 | SYSTOLIC BLOOD PRESSURE: 122 MMHG | WEIGHT: 238.2 LBS | DIASTOLIC BLOOD PRESSURE: 70 MMHG | HEART RATE: 100 BPM | HEIGHT: 67 IN

## 2021-12-07 DIAGNOSIS — G47.33 OBSTRUCTIVE SLEEP APNEA (ADULT) (PEDIATRIC): ICD-10-CM

## 2021-12-07 PROCEDURE — 99203 OFFICE O/P NEW LOW 30 MIN: CPT | Performed by: INTERNAL MEDICINE

## 2021-12-07 PROCEDURE — 3074F SYST BP LT 130 MM HG: CPT | Performed by: INTERNAL MEDICINE

## 2021-12-07 PROCEDURE — 3008F BODY MASS INDEX DOCD: CPT | Performed by: INTERNAL MEDICINE

## 2021-12-07 PROCEDURE — 1036F TOBACCO NON-USER: CPT | Performed by: INTERNAL MEDICINE

## 2021-12-07 PROCEDURE — 3078F DIAST BP <80 MM HG: CPT | Performed by: INTERNAL MEDICINE

## 2021-12-08 ENCOUNTER — TELEPHONE (OUTPATIENT)
Dept: SLEEP CENTER | Facility: CLINIC | Age: 56
End: 2021-12-08

## 2022-01-17 LAB
1,25(OH)2D SERPL-MCNC: 40 PG/ML (ref 18–72)
1,25(OH)2D2 SERPL-MCNC: <8 PG/ML
1,25(OH)2D3 SERPL-MCNC: 40 PG/ML
ALBUMIN SERPL-MCNC: 4 G/DL (ref 3.6–5.1)
ALBUMIN/CREAT UR: 51 MCG/MG CREAT
ALBUMIN/GLOB SERPL: 1.9 (CALC) (ref 1–2.5)
ALP SERPL-CCNC: 81 U/L (ref 35–144)
ALT SERPL-CCNC: 15 U/L (ref 9–46)
AST SERPL-CCNC: 13 U/L (ref 10–35)
BASOPHILS # BLD AUTO: 63 CELLS/UL (ref 0–200)
BASOPHILS NFR BLD AUTO: 0.7 %
BILIRUB SERPL-MCNC: 0.6 MG/DL (ref 0.2–1.2)
BUN SERPL-MCNC: 22 MG/DL (ref 7–25)
BUN/CREAT SERPL: ABNORMAL (CALC) (ref 6–22)
CALCIUM SERPL-MCNC: 8.7 MG/DL (ref 8.6–10.3)
CHLORIDE SERPL-SCNC: 106 MMOL/L (ref 98–110)
CHOLEST SERPL-MCNC: 116 MG/DL
CHOLEST/HDLC SERPL: 3.5 (CALC)
CO2 SERPL-SCNC: 26 MMOL/L (ref 20–32)
CREAT SERPL-MCNC: 0.86 MG/DL (ref 0.7–1.33)
CREAT UR-MCNC: 178 MG/DL (ref 20–320)
EOSINOPHIL # BLD AUTO: 126 CELLS/UL (ref 15–500)
EOSINOPHIL NFR BLD AUTO: 1.4 %
ERYTHROCYTE [DISTWIDTH] IN BLOOD BY AUTOMATED COUNT: 13.3 % (ref 11–15)
GLOBULIN SER CALC-MCNC: 2.1 G/DL (CALC) (ref 1.9–3.7)
GLUCOSE SERPL-MCNC: 126 MG/DL (ref 65–99)
HBA1C MFR BLD: 6.1 % OF TOTAL HGB
HCT VFR BLD AUTO: 48.7 % (ref 38.5–50)
HDLC SERPL-MCNC: 33 MG/DL
HGB BLD-MCNC: 16.1 G/DL (ref 13.2–17.1)
LDLC SERPL CALC-MCNC: 63 MG/DL (CALC)
LYMPHOCYTES # BLD AUTO: 1773 CELLS/UL (ref 850–3900)
LYMPHOCYTES NFR BLD AUTO: 19.7 %
MCH RBC QN AUTO: 27.2 PG (ref 27–33)
MCHC RBC AUTO-ENTMCNC: 33.1 G/DL (ref 32–36)
MCV RBC AUTO: 82.1 FL (ref 80–100)
MICROALBUMIN UR-MCNC: 9.1 MG/DL
MONOCYTES # BLD AUTO: 1053 CELLS/UL (ref 200–950)
MONOCYTES NFR BLD AUTO: 11.7 %
NEUTROPHILS # BLD AUTO: 5985 CELLS/UL (ref 1500–7800)
NEUTROPHILS NFR BLD AUTO: 66.5 %
NONHDLC SERPL-MCNC: 83 MG/DL (CALC)
PLATELET # BLD AUTO: 203 THOUSAND/UL (ref 140–400)
PMV BLD REES-ECKER: 11.5 FL (ref 7.5–12.5)
POTASSIUM SERPL-SCNC: 3.9 MMOL/L (ref 3.5–5.3)
PROT SERPL-MCNC: 6.1 G/DL (ref 6.1–8.1)
RBC # BLD AUTO: 5.93 MILLION/UL (ref 4.2–5.8)
SL AMB EGFR AFRICAN AMERICAN: 114 ML/MIN/1.73M2
SL AMB EGFR NON AFRICAN AMERICAN: 98 ML/MIN/1.73M2
SODIUM SERPL-SCNC: 141 MMOL/L (ref 135–146)
TRIGL SERPL-MCNC: 115 MG/DL
TSH SERPL-ACNC: 2.32 MIU/L (ref 0.4–4.5)
WBC # BLD AUTO: 9 THOUSAND/UL (ref 3.8–10.8)

## 2022-01-17 PROCEDURE — 3044F HG A1C LEVEL LT 7.0%: CPT | Performed by: INTERNAL MEDICINE

## 2022-01-17 PROCEDURE — 3060F POS MICROALBUMINURIA REV: CPT | Performed by: INTERNAL MEDICINE

## 2022-01-18 ENCOUNTER — TELEPHONE (OUTPATIENT)
Dept: UROLOGY | Facility: AMBULATORY SURGERY CENTER | Age: 57
End: 2022-01-18

## 2022-01-18 NOTE — TELEPHONE ENCOUNTER
Hello,     Patient is scheduled to be seen at [address] [place] [address] Urology (NPI: [id number]) on 1/25, and needs an insurance referral for the visit  The diagnosis codes needed for the appointment will be N40 1, R35 0, N52 1, N52 2, and N50 812  The procedure code needed for the visit will be [id number]  Thank you for your time and help

## 2022-01-21 ENCOUNTER — TELEPHONE (OUTPATIENT)
Dept: UROLOGY | Facility: AMBULATORY SURGERY CENTER | Age: 57
End: 2022-01-21

## 2022-01-21 NOTE — TELEPHONE ENCOUNTER
Called patient to let him know that he does need a US completed prior to his appointment  Pt did not answer left detailed message

## 2022-01-24 ENCOUNTER — TELEMEDICINE (OUTPATIENT)
Dept: FAMILY MEDICINE CLINIC | Facility: CLINIC | Age: 57
End: 2022-01-24
Payer: COMMERCIAL

## 2022-01-24 VITALS
HEART RATE: 80 BPM | DIASTOLIC BLOOD PRESSURE: 90 MMHG | BODY MASS INDEX: 37.28 KG/M2 | SYSTOLIC BLOOD PRESSURE: 133 MMHG | WEIGHT: 238 LBS | TEMPERATURE: 98 F

## 2022-01-24 DIAGNOSIS — R05.9 COUGH: ICD-10-CM

## 2022-01-24 DIAGNOSIS — R06.00 DOE (DYSPNEA ON EXERTION): Primary | ICD-10-CM

## 2022-01-24 PROBLEM — R06.09 DOE (DYSPNEA ON EXERTION): Status: ACTIVE | Noted: 2017-12-08

## 2022-01-24 PROCEDURE — 99214 OFFICE O/P EST MOD 30 MIN: CPT | Performed by: FAMILY MEDICINE

## 2022-01-24 RX ORDER — ERGOCALCIFEROL (VITAMIN D2) 1250 MCG
50000 CAPSULE ORAL
COMMUNITY
Start: 2022-01-18 | End: 2022-07-20

## 2022-01-24 RX ORDER — VALSARTAN 320 MG/1
TABLET ORAL
COMMUNITY
Start: 2022-01-18 | End: 2022-01-27

## 2022-01-25 ENCOUNTER — TELEPHONE (OUTPATIENT)
Dept: CARDIOLOGY CLINIC | Facility: CLINIC | Age: 57
End: 2022-01-25

## 2022-01-25 NOTE — ASSESSMENT & PLAN NOTE
Uncertain of the exact etiology  Patient does have history of Ace induce cough and he is now on angiotensin receptor blocker    Cough could be related to medication

## 2022-01-25 NOTE — PROGRESS NOTES
Virtual Regular Visit    Verification of patient location:    Patient is located in the following state in which I hold an active license [place]      Assessment/Plan:    Problem List Items Addressed This Visit        Other    Cough     Uncertain of the exact etiology  Patient does have history of Ace induce cough and he is now on angiotensin receptor blocker  Cough could be related to medication         JACOBO (dyspnea on exertion) - Primary     Patient does complain of dyspnea on exertion  He does have history of coronary artery disease as well as history of atrial fibrillation  Patient's last echocardiogram done in January of 2021 showed left ventricular ejection fraction of 50-55%  Medications were adjusted based upon insurance coverage by his electrophysiologist     Will send message to electrophysiologist   I do believe that the patient needs to be seen  He is having some elevated blood pressure readings as well as pulse rates 1st thing morning  Uncertain if patient would be going back in to atrial fibrillation  Should not be since he is own Tikosyn                    Reason for visit is   Chief Complaint   Patient presents with    Virtual Brief Visit    Shortness of Breath     w/exertion    Virtual Regular Visit        Encounter provider Catalina Neri DO    Provider located at [address] [place] [id number]      Recent Visits  No visits were found meeting these conditions  Showing recent visits within past 7 days and meeting all other requirements  Today's Visits  Date Type Provider Dept   01/24/22 Telemedicine Catalina Nrei DO [facility]   Showing today's visits and meeting all other requirements  Future Appointments  No visits were found meeting these conditions  Showing future appointments within next 150 days and meeting all other requirements       The patient was identified by name and date of birth   Tye Jeter was informed that this is a telemedicine visit and that the visit is being conducted through [address] [place] [address] Now and patient was informed that this is a secure, HIPAA-compliant platform  He agrees to proceed     My office door was closed  No one else was in the room  He acknowledged consent and understanding of privacy and security of the video platform  The patient has agreed to participate and understands they can discontinue the visit at any time  Patient is aware this is a billable service  Subjective  Kelton Marc is a 49 y o  male    24-year-old male with past medical history of hypertension, hyperlipidemia, atrial fibrillation, tachycardic cardiomyopathy, prior history of ACE-inhibitor induced cough presents via video virtual visit along with his wife complaining of a 3 to four-week history of dyspnea on exertion as well as periodic dry cough which seem to occur with exertion  Patient denies any overt chest pain  Patient states that 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 he will start coughing and finds himself short of breath  Cough is not productive  No fevers or chills  Patient has been fully vaccinated and boosted against COVID-19  He does not overall feel poorly but is concerned that he will not be able to perform his duties as a  if he finds himself short of breath  Back in September his Corlanor was discontinued as his insurance was no longer covering it and he was placed on valsartan 160 mg once daily  Patient does have prior history of Ace induce cough  No issues when lying flat    He does use CPAP machine       Past Medical History:   Diagnosis Date    Abnormal EKG 12/8/2017    ACE-inhibitor cough 10/13/2020    Breast mass in male 2/22/2021    CAD (coronary artery disease)     Diabetes (HCC)     Dizziness     Erectile dysfunction     GERD (gastroesophageal reflux disease)     Hyperlipidemia     Hypertension     Shortness of breath        Past Surgical History:   Procedure Laterality Date    CARDIAC CATHETERIZATION      ELECTRICAL CARDIOVERSION  2/4/2019            Current Outpatient Medications   Medication Sig Dispense Refill    apixaban (ELIQUIS) 5 mg Take 1 tablet (5 mg total) by mouth 2 (two) times a day 180 tablet 3    cholecalciferol (VITAMIN D3) 1,000 units tablet Take by mouth daily      co-enzyme Q-10 30 MG capsule Take 30 mg by mouth daily        dofetilide (TIKOSYN) 125 mcg capsule TAKE 4 CAPSULES(500 MCG) BY MOUTH TWICE DAILY (Patient taking differently: Take 125 mcg by mouth 2 (two) times a day One capsule twice daily) 180 capsule 3    ergocalciferol (ERGOCALCIFEROL) 1 25 MG (81022 UT) capsule Take 50,000 Units by mouth      ezetimibe (ZETIA) 10 mg tablet Take 10 mg by mouth daily      metoprolol succinate (TOPROL-XL) 200 MG 24 hr tablet Take 1 tablet (200 mg total) by mouth 2 (two) times a day 180 tablet 3    ONETOUCH DELICA LANCETS 52H MISC CHECK BID  1    OZEMPIC 1 MG/DOSE SOPN INJECT 1 MG SQ Q WEEK  4    rosuvastatin (CRESTOR) 20 MG tablet Take 1 tablet (20 mg total) by mouth daily 90 tablet 1    SYNJARDY XR 12 5-1000 MG TB24 TK 1 T PO BID  2    valsartan (DIOVAN) 160 mg tablet Take 1 tablet (160 mg total) by mouth daily 180 tablet 3    aspirin 81 mg chewable tablet Chew 1 tablet (81 mg total) daily 100 tablet 1    ONETOUCH VERIO test strip CHECK TWICE DAILY  3    sildenafil (VIAGRA) 25 MG tablet Take 1 tablet (25 mg total) by mouth daily as needed for erectile dysfunction (Patient not taking: Reported on 12/7/2021 ) 20 tablet 1    tamsulosin (FLOMAX) 0 4 mg Take 1 capsule (0 4 mg total) by mouth daily with dinner (Patient not taking: Reported on 12/7/2021 ) 30 capsule 3    valsartan (DIOVAN) 320 MG tablet        No current facility-administered medications for this visit          Allergies   Allergen Reactions    Dye [Iodinated Diagnostic Agents] Hives and GI Intolerance     Definity - name of contrast      Aceinhibitors [Ace Inhibitors] Cough       Review of Systems   Constitutional: Negative  Negative for chills, fatigue and fever  Respiratory: Positive for cough ( with exertion) and shortness of breath (With exertion)  Negative for wheezing  Cardiovascular: Negative for chest pain and leg swelling  Video Exam    Vitals:    01/24/22 1126   BP: 133/90   Pulse: 80   Temp: 98 °F (36 7 °C)   Weight: 108 kg (238 lb)       Physical Exam  Vitals and nursing note reviewed  Constitutional:       General: He is not in acute distress  Appearance: Normal appearance  He is well-developed  He is obese  He is not ill-appearing  Comments: No acute distress  Nonlabored breathing   HENT:      Head: Normocephalic and atraumatic  Eyes:      Extraocular Movements: Extraocular movements intact  Conjunctiva/sclera: Conjunctivae normal       Pupils: Pupils are equal, round, and reactive to light  Pulmonary:      Effort: Pulmonary effort is normal  No respiratory distress  Comments: Patient did have a very mild cough that seemed dry and was not productive  Neurological:      General: No focal deficit present  Mental Status: He is alert and oriented to person, place, and time  Psychiatric:         Mood and Affect: Mood normal          Behavior: Behavior normal          Thought Content: Thought content normal          Judgment: Judgment normal           I spent 14 minutes directly with the patient during this visit    VIRTUAL VISIT DISCLAIMER      Kady Jeter verbally agrees to participate in [employer]  Pt is aware that [employer] could be limited without vital signs or the ability to perform a full hands-on physical exam  Eduin Jeter understands he or the provider may request at any time to terminate the video visit and request the patient to seek care or treatment in person

## 2022-01-25 NOTE — TELEPHONE ENCOUNTER
Patient is c/o dry cough and SOB which he states has been present for one month and is asking if this could be due to one of his medications  He had a virtual visit with his PCP yesterday and said that he was going to reach out to you, Dr Penney Frankel  Patient is on Eliquis, dofetilide, metoprolol,  And losartan from our office  However, none of these are new in the past month  The valsartan was started in September 2021  He used to take Corlanor but discontinued due to no coverage

## 2022-01-27 ENCOUNTER — TELEPHONE (OUTPATIENT)
Dept: CARDIOLOGY CLINIC | Facility: CLINIC | Age: 57
End: 2022-01-27

## 2022-01-27 ENCOUNTER — OFFICE VISIT (OUTPATIENT)
Dept: CARDIOLOGY CLINIC | Facility: CLINIC | Age: 57
End: 2022-01-27
Payer: COMMERCIAL

## 2022-01-27 VITALS
WEIGHT: 236.6 LBS | SYSTOLIC BLOOD PRESSURE: 136 MMHG | BODY MASS INDEX: 37.13 KG/M2 | HEART RATE: 94 BPM | HEIGHT: 67 IN | DIASTOLIC BLOOD PRESSURE: 92 MMHG

## 2022-01-27 DIAGNOSIS — R00.0 SINUS TACHYCARDIA: ICD-10-CM

## 2022-01-27 DIAGNOSIS — I44.7 LBBB (LEFT BUNDLE BRANCH BLOCK): ICD-10-CM

## 2022-01-27 DIAGNOSIS — I50.22 CHF (CONGESTIVE HEART FAILURE), NYHA CLASS III, CHRONIC, SYSTOLIC (HCC): ICD-10-CM

## 2022-01-27 DIAGNOSIS — I48.19 PERSISTENT ATRIAL FIBRILLATION (HCC): Primary | ICD-10-CM

## 2022-01-27 PROCEDURE — 3075F SYST BP GE 130 - 139MM HG: CPT | Performed by: INTERNAL MEDICINE

## 2022-01-27 PROCEDURE — 99215 OFFICE O/P EST HI 40 MIN: CPT | Performed by: INTERNAL MEDICINE

## 2022-01-27 PROCEDURE — 3080F DIAST BP >= 90 MM HG: CPT | Performed by: INTERNAL MEDICINE

## 2022-01-27 PROCEDURE — 3008F BODY MASS INDEX DOCD: CPT | Performed by: INTERNAL MEDICINE

## 2022-01-27 PROCEDURE — 1036F TOBACCO NON-USER: CPT | Performed by: INTERNAL MEDICINE

## 2022-01-27 PROCEDURE — 93000 ELECTROCARDIOGRAM COMPLETE: CPT | Performed by: INTERNAL MEDICINE

## 2022-01-27 RX ORDER — FUROSEMIDE 20 MG/1
20 TABLET ORAL DAILY
Qty: 90 TABLET | Refills: 3 | Status: SHIPPED | OUTPATIENT
Start: 2022-01-27

## 2022-01-27 RX ORDER — IVABRADINE 5 MG/1
5 TABLET, FILM COATED ORAL 2 TIMES DAILY
Qty: 60 TABLET | Refills: 11 | Status: SHIPPED | OUTPATIENT
Start: 2022-01-27

## 2022-01-27 NOTE — TELEPHONE ENCOUNTER
Dominik Munoz,    Anyway you can help me with this? This patient was on Corlanor for HF but the insurance stopped covering it so Dr Bruner Sos took him off  He is going back into HF and would like to start him back on Corlanor  Is there anyway we could appeal the decision? It would be for Corlanor 5mg BID  ASAP if possible  Thank you so much for your help with this

## 2022-01-27 NOTE — PROGRESS NOTES
HEART AND VASCULAR  CARDIAC   [address] [facility]    Outpatient  Follow-up  Today's Date: 01/27/22        Patient name: Raoul Jeter  YOB: 1965  Sex: male         Chief Complaint: f/u afib      ASSESSMENT:  Problem List Items Addressed This Visit        Cardiovascular and Mediastinum    Persistent atrial fibrillation ([address] ) - Primary    Relevant Medications    ivabradine HCl (Corlanor) 5 MG tablet    Other Relevant Orders    POCT ECG      Other Visit Diagnoses     CHF (congestive heart failure), NYHA class III, chronic, systolic (HCC)        Relevant Medications    ivabradine HCl (Corlanor) 5 MG tablet    furosemide (LASIX) 20 mg tablet    Other Relevant Orders    NM myocardial perfusion spect (stress and/or rest)    Echo complete w/ contrast if indicated    Sinus tachycardia        Relevant Medications    ivabradine HCl (Corlanor) 5 MG tablet    Other Relevant Orders    NM myocardial perfusion spect (stress and/or rest)    Echo complete w/ contrast if indicated    LBBB (left bundle branch block)        Relevant Orders    NM myocardial perfusion spect (stress and/or rest)    Echo complete w/ contrast if indicated        1) Persistent afib, in NSR x 2 years after ablation/tikosyn  Kellie Nataliya [id number]=5 and on Eliquis and tikosyn 125mcg bid    2)  Acute on chronic Systolic chf EF 36% improved 55% (jan 20210 after rhythm control and also corlanor for persistent sinus tachy, 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  Was on corlanor but no longer covered  HR is 94bpm today in NSR but shows me 100-110bpm on BP checks,  Despite increasing metoprolol to 200mg BID       Now having weight gain, SOB on exertion, feels unwell suspect the chronic sinus tachycardia contributing to heart failure    3) Stabl eCAD h/o PCI to LAD 2015    4) LBBB chronic  5) HTN mildly elevated sicne we stopped spirolactone (had gynecomastia from this)    6) NORA on CPAP    7) obesity- he has lost a lot of weight w diet oracio exercise    7) T2DM      PLAN:  1  He was much better when on Corlanor, HR is now about 100bpm chronically in NSR/sinus tach despite toprol XL 200mg bid and symptomatic  His EF had improved 30 to 55 w Corlanor on board so I will try to appeal to get him back on this  2  cont valsartan  3  Cont eliquis/tikosyn  4  Check Nuclear stress (needs imaging given LBBB) and TTE  5  Start lasix 20mg daily for edema, CHF  Follow up in: 2 months    Orders Placed This Encounter   Procedures    POCT ECG    Echo complete w/ contrast if indicated     Medications Discontinued During This Encounter   Medication Reason    valsartan (DIOVAN) 320 MG tablet              HPI/Subjective:     1) Persistent afib, in NSR x 2 years after ablation/tikosyn  Dorr McNeil [id number]=0 and on Eliquis and tikosyn 125mcg bid    2) H/o Systolic chf EF 66% improved 55% (jan 20210 after rhythm control, 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  Was on corlanor but no longer covered  HR is 94bpm today in NSR but shows me 100-110bpm on BP checks,  Despite increasing metoprolol to 200mg BID  Now having weight gain, SOB on exertion, feels unwell suspect the chronic sinus tachycardia contributing to heart failure    3) Stabl eCAD h/o PCI to LAD 2015    4) LBBB chronic  5) HTN mildly elevated sicne we stopped spirolactone (had gynecomastia from this)    6) NORA on CPAP    7) obesity- he has lost a lot of weight w diet oracio exercise    7) T2DM    Olamide Urias feels much worse since stopping corllanor 2 months ago, HR is 100bpm most of time, feels Short of breath on exertion, fatigue, wakes up like he cant breath sometimes  Call it "hot flashes"      Please note HPI is listed by problem with with update following it, it is copied again in the assessment above and reflects medical decision making as well         Complete 12 point ROS reviewed and otherwise non pertinent or negative except as per HPI pertinent positives in Cardiovascular and Respiratory emphasized  Please see paper chart for outpatient clinic patients where the patient completed the 12 point ROS survey  Past Medical History:   Diagnosis Date    Abnormal EKG 12/8/2017    ACE-inhibitor cough 10/13/2020    Breast mass in male 2/22/2021    CAD (coronary artery disease)     Diabetes (HCC)     Dizziness     Erectile dysfunction     GERD (gastroesophageal reflux disease)     Hyperlipidemia     Hypertension     Shortness of breath        Allergies   Allergen Reactions    Dye [Iodinated Diagnostic Agents] Hives and GI Intolerance     Definity - name of contrast      Aceinhibitors [Ace Inhibitors] Cough     I reviewed the Home Medication list and Allergies in the chart     Scheduled Meds:  Current Outpatient Medications   Medication Sig Dispense Refill    apixaban (ELIQUIS) 5 mg Take 1 tablet (5 mg total) by mouth 2 (two) times a day 180 tablet 3    aspirin 81 mg chewable tablet Chew 1 tablet (81 mg total) daily 100 tablet 1    cholecalciferol (VITAMIN D3) 1,000 units tablet Take by mouth daily      co-enzyme Q-10 30 MG capsule Take 30 mg by mouth daily        dofetilide (TIKOSYN) 125 mcg capsule TAKE 4 CAPSULES(500 MCG) BY MOUTH TWICE DAILY (Patient taking differently: Take 125 mcg by mouth 2 (two) times a day One capsule twice daily) 180 capsule 3    ergocalciferol (ERGOCALCIFEROL) 1 25 MG (66322 UT) capsule Take 50,000 Units by mouth      ezetimibe (ZETIA) 10 mg tablet Take 10 mg by mouth daily      metoprolol succinate (TOPROL-XL) 200 MG 24 hr tablet Take 1 tablet (200 mg total) by mouth 2 (two) times a day 180 tablet 3    ONETOUCH DELICA LANCETS 02O MISC CHECK BID  1    ONETOUCH VERIO test strip CHECK TWICE DAILY  3    OZEMPIC 1 MG/DOSE SOPN INJECT 1 MG SQ Q WEEK  4    rosuvastatin (CRESTOR) 20 MG tablet Take 1 tablet (20 mg total) by mouth daily 90 tablet 1    SYNJARDY XR 12 5-1000 MG TB24 TK 1 T PO BID  2    tamsulosin (FLOMAX) 0 4 mg Take 1 capsule (0 4 mg total) by mouth daily with dinner 30 capsule 3    valsartan (DIOVAN) 160 mg tablet Take 1 tablet (160 mg total) by mouth daily 180 tablet 3    furosemide (LASIX) 20 mg tablet Take 1 tablet (20 mg total) by mouth daily 90 tablet 3    ivabradine HCl (Corlanor) 5 MG tablet Take 1 tablet (5 mg total) by mouth 2 (two) times a day 60 tablet 11    sildenafil (VIAGRA) 25 MG tablet Take 1 tablet (25 mg total) by mouth daily as needed for erectile dysfunction (Patient not taking: Reported on 12/7/2021 ) 20 tablet 1     No current facility-administered medications for this visit       PRN Meds:         Family History   Problem Relation Age of Onset    Coronary artery disease Family     Diabetes Family     No Known Problems Father        Social History     Socioeconomic History    Marital status: /Civil Union     Spouse name: Not on file    Number of children: Not on file    Years of education: Not on file    Highest education level: Not on file   Occupational History    Not on file   Tobacco Use    Smoking status: Never Smoker    Smokeless tobacco: Former User   Vaping Use    Vaping Use: Never used   Substance and Sexual Activity    Alcohol use: No    Drug use: No    Sexual activity: Not on file   Other Topics Concern    Not on file   Social History Narrative    Not on file     Social Determinants of Health     Financial Resource Strain: Not on file   Food Insecurity: Not on file   Transportation Needs: Not on file   Physical Activity: Not on file   Stress: Not on file   Social Connections: Not on file   Intimate Partner Violence: Not on file   Housing Stability: Not on file         OBJECTIVE:    /92 (BP Location: Left arm, Patient Position: Sitting, Cuff Size: Large)   Pulse 94   Ht 5' 7" (1 702 m)   Wt 107 kg (236 lb 9 6 oz)   BMI 37 06 kg/m²   Vitals:    01/27/22 1410   Weight: 107 kg (236 lb 9 6 oz)     /92 (BP Location: Left arm, Patient Position: Sitting, Cuff Size: Large)   Pulse 94   Ht 5' 7" (1 702 m)   Wt 107 kg (236 lb 9 6 oz)   BMI 37 06 kg/m²   Vitals:    01/27/22 1410   Weight: 107 kg (236 lb 9 6 oz)     /92 (BP Location: Left arm, Patient Position: Sitting, Cuff Size: Large)   Pulse 94   Ht 5' 7" (1 702 m)   Wt 107 kg (236 lb 9 6 oz)   BMI 37 06 kg/m²   Vitals:    01/27/22 1410   Weight: 107 kg (236 lb 9 6 oz)     GEN: No acute distress, Alert and oriented,   HEENT:External ears normal, wearing a mask  EYES: Pupils equal, sclera anicteric, midline, normal conjuctiva  NECK:  +JVD, supple, no obvious masses or thryomegaly or goiter  CARDIOVASCULAR: Tachy but regular, No murmur, rub, gallops S1,S2  LUNGS: Clear To auscultation bilaterally, normal effort, no rales, rhonchi, crackles   ABDOMEN:  nondistended,  without obvious organomegaly or ascites  EXTREMITIES/VASCULAR: trace nolan edema  warm an well perfused  PSYCH: Normal Affect,  linear speech pattern without evidence of psychosis  NEURO: Grossly intact, moving all extremiteis equal, face symmetric, alert and responsive, no obvious focal defecits   GAIT:  Ambulates normally without difficulty  HEME: No bleeding, bruising, petechia, purpura   SKIN: No significant rashes on visibile skin, warm, no diaphoresis or pallor         Lab Results:       LABS:      Chemistry        Component Value Date/Time    K 3 9 01/13/2022 0000     01/13/2022 0000    CO2 26 01/13/2022 0000    BUN 22 01/13/2022 0000    CREATININE 0 86 01/13/2022 0000    CREATININE 0 88 02/04/2019 0817        Component Value Date/Time    CALCIUM 8 7 01/13/2022 0000    ALKPHOS 81 01/13/2022 0000    AST 13 01/13/2022 0000    ALT 15 01/13/2022 0000            No results found for: CHOL  Lab Results   Component Value Date    HDL 33 (L) 01/13/2022    HDL 38 (L) 10/09/2020    HDL 35 (L) 09/23/2019     Lab Results   Component Value Date    LDLCALC 63 01/13/2022    [facility] 92 10/09/2020    LDLCALC 89 09/23/2019     Lab Results   Component Value Date    TRIG 115 01/13/2022    TRIG 110 10/09/2020    TRIG 78 09/23/2019     No results found for: CHOLHDL    IMAGING: Us Breast Left Limited (diagnostic), Mammo Diagnostic Bilateral W 3d & Cad    Result Date: 3/5/2021  Narrative: DIAGNOSIS: Breast mass in male TECHNIQUE: Digital diagnostic mammography was performed  Computer Aided Detection (CAD) analyzed all applicable images  Ultrasound of the left breast(s) was performed  COMPARISONS: None available  RELEVANT HISTORY: Family Breast Cancer History: No known family history of breast cancer  Family Medical History: No known relevant family medical history  Personal History: No known relevant hormone history  No known relevant surgical history  No known relevant medical history  RISK ASSESSMENT: [facility] risk assessment reporting was suppressed due to the patient's history and/or demographic factors  TISSUE DENSITY: The breasts are almost entirely fatty  INDICATION: Susan Abbasi is a 50 y o  male presenting for evaluation of a tender palpable left breast mass  FINDINGS: Palpable triangle marker over the left breast has moderate subjacent gynecomastia  There is no underlying mass on mammography  Sonographic evaluation left retroareolar breast was performed in the area of palpable tender concern  There is moderate gynecomastia  There is no significant gynecomastia on the right  No suspicious masses in right breast on mammography  Impression:   Moderate left gynecomastia accounting for patient's tender palpable concern  Recommend clinical management  ASSESSMENT/BI-RADS CATEGORY: Left: 2 - Benign Right: 1 - Negative Overall: 2 - Benign RECOMMENDATION:      - Clinical management for both breasts  Workstation ID: [id number]        Cardiac testing:   No results found for this or any previous visit    Results for orders placed during the hospital encounter of 01/22/19   ELZBIETA    Narrative [facility] [address]  [facility] [facility]  [phone]    Transesophageal Echocardiogram  2D, Doppler, and Color Doppler    Study date:  2019    Patient: Marcia Georges  MR number: [id number]  Account number: [de-identified]  : 1965  Age: 48 years  Gender: Male  Status: Inpatient  Location: Echo lab  Height: 67 in  Weight: 235 lb  BP: 117/ 83 mmHg    Indications: Afib    Diagnoses: I48 0 - Atrial fibrillation    Sonographer:  ALEX Schneider  Primary Physician:  Manuelito Anna DO  Referring Physician:  Nimisha Cooper MD  Group:  [id number]  Interpreting Physician:  Lisa Nye MD    SUMMARY    LEFT VENTRICLE:  The ventricle was moderately dilated  Systolic function was moderately to markedly reduced  Ejection fraction was estimated to be 30 %  LEFT ATRIUM:  The atrium was mildly to moderately dilated  TRICUSPID VALVE:  There was mild regurgitation  HISTORY: PRIOR HISTORY: Afib, CAD, HTN, NORA, DM2, HLD, Former smoker, Cardiomyopathy    PROCEDURE: The procedure was performed in the echo lab  This was a routine study  The risks and alternatives of the procedure were explained to the patient and informed consent was obtained  The transesophageal approach was used  The study  included complete 2D imaging, complete spectral Doppler, and color Doppler  The heart rate was 114 bpm, at the start of the study  An adult omniplane probe was inserted by the attending cardiologist  Intubated with difficulty  Four  intubation attempt(s)  There was no blood detected on the probe  Image quality was adequate  MEDICATIONS: Anesthesia  LEFT VENTRICLE: The ventricle was moderately dilated  Systolic function was moderately to markedly reduced  Ejection fraction was estimated to be 30 %  RIGHT VENTRICLE: The size was normal  Systolic function was normal  Wall thickness was normal     LEFT ATRIUM: The atrium was mildly to moderately dilated   No mass was present  There was no spontaneous echo contrast ("smoke")  APPENDAGE: The size was normal  No thrombus was identified  DOPPLER: The function was mildly reduced (mildly  reduced emptying velocity)  MITRAL VALVE: Valve structure was normal  There was normal leaflet separation  There was no echocardiographic evidence of vegetation  DOPPLER: There was no regurgitation  AORTIC VALVE: The valve was trileaflet  Leaflets exhibited normal thickness and normal cuspal separation  There was no echocardiographic evidence of vegetation  DOPPLER: There was no regurgitation  TRICUSPID VALVE: The valve structure was normal  There was normal leaflet separation  There was no echocardiographic evidence of vegetation  DOPPLER: There was mild regurgitation  PERICARDIUM: There was no pericardial effusion  The pericardium was normal in appearance  [address] Accredited Echocardiography Laboratory    Prepared and electronically signed by    Elly Blanchard MD  Signed 25-Jan-2019 17:11:44       No results found for this or any previous visit  No results found for this or any previous visit          I reviewed and interpreted the following LABS/EKG/TELE/IMAGING and below is summary of my interpretation (if data available):        Current EKG and Rhythm Strip:NSR, 94bpm LBBB

## 2022-01-28 ENCOUNTER — TELEPHONE (OUTPATIENT)
Dept: CARDIOLOGY CLINIC | Facility: CLINIC | Age: 57
End: 2022-01-28

## 2022-01-28 DIAGNOSIS — I48.91 NEW ONSET ATRIAL FIBRILLATION (HCC): ICD-10-CM

## 2022-01-28 DIAGNOSIS — I25.119 CORONARY ARTERY DISEASE INVOLVING NATIVE CORONARY ARTERY OF NATIVE HEART WITH ANGINA PECTORIS (HCC): ICD-10-CM

## 2022-02-01 ENCOUNTER — TELEPHONE (OUTPATIENT)
Dept: CARDIOLOGY CLINIC | Facility: CLINIC | Age: 57
End: 2022-02-01

## 2022-02-16 ENCOUNTER — DOCUMENTATION (OUTPATIENT)
Dept: CARDIOLOGY CLINIC | Facility: CLINIC | Age: 57
End: 2022-02-16

## 2022-02-16 NOTE — PROGRESS NOTES
Received forms via email  Completed and signed by Dr Adelaida Soto   Faxed to Laurel Ordoñez Mulhall [facility] at [phone] and emailed a copy to the pt per his request

## 2022-02-22 ENCOUNTER — HOSPITAL ENCOUNTER (OUTPATIENT)
Dept: NON INVASIVE DIAGNOSTICS | Facility: CLINIC | Age: 57
Discharge: HOME | End: 2022-02-22
Payer: COMMERCIAL

## 2022-02-22 VITALS
BODY MASS INDEX: 37.04 KG/M2 | DIASTOLIC BLOOD PRESSURE: 92 MMHG | WEIGHT: 236 LBS | SYSTOLIC BLOOD PRESSURE: 126 MMHG | HEIGHT: 67 IN | HEART RATE: 70 BPM

## 2022-02-22 DIAGNOSIS — R00.0 SINUS TACHYCARDIA: ICD-10-CM

## 2022-02-22 DIAGNOSIS — I50.22 CHF (CONGESTIVE HEART FAILURE), NYHA CLASS III, CHRONIC, SYSTOLIC (HCC): ICD-10-CM

## 2022-02-22 DIAGNOSIS — I44.7 LBBB (LEFT BUNDLE BRANCH BLOCK): ICD-10-CM

## 2022-02-22 LAB
AORTIC ROOT: 3.1 CM
APICAL FOUR CHAMBER EJECTION FRACTION: 50 %
E WAVE DECELERATION TIME: 172 MS
FRACTIONAL SHORTENING: 26 % (ref 28–44)
INTERVENTRICULAR SEPTUM IN DIASTOLE (PARASTERNAL SHORT AXIS VIEW): 1.5 CM (ref 0.56–1.06)
INTERVENTRICULAR SEPTUM: 1.5 CM (ref 0.6–1.1)
LEFT ATRIUM AREA SYSTOLE SINGLE PLANE A4C: 23.5 CM2
LEFT ATRIUM SIZE: 5 CM
LEFT INTERNAL DIMENSION IN SYSTOLE: 4 CM (ref 4.37–6.62)
LEFT VENTRICULAR INTERNAL DIMENSION IN DIASTOLE: 5.4 CM (ref 7.3–10.88)
LEFT VENTRICULAR POSTERIOR WALL IN END DIASTOLE: 1.5 CM (ref 0.55–1.04)
LEFT VENTRICULAR STROKE VOLUME: 70 ML
LVSV (TEICH): 70 ML
MV E'TISSUE VEL-SEP: 5 CM/S
MV PEAK A VEL: 0.85 M/S
MV PEAK E VEL: 66 CM/S
MV STENOSIS PRESSURE HALF TIME: 50 MS
MV VALVE AREA P 1/2 METHOD: 4.4 CM2
NUC STRESS EJECTION FRACTION: 43 %
RATE PRESSURE PRODUCT: NORMAL
RIGHT ATRIUM AREA SYSTOLE A4C: 21.2 CM2
RIGHT VENTRICLE ID DIMENSION: 3.8 CM
SL CV LV EF: 45
SL CV PED ECHO LEFT VENTRICLE DIASTOLIC VOLUME (MOD BIPLANE) 2D: 140 ML
SL CV PED ECHO LEFT VENTRICLE SYSTOLIC VOLUME (MOD BIPLANE) 2D: 70 ML
SL CV REST NUCLEAR ISOTOPE DOSE: 10.91 MCI
SL CV STRESS NUCLEAR ISOTOPE DOSE: 32.8 MCI
SL CV STRESS RECOVERY BP: NORMAL MMHG
SL CV STRESS RECOVERY HR: 78 BPM
SL CV STRESS RECOVERY O2 SAT: 98 %
STRESS ANGINA INDEX: 0
STRESS BASELINE BP: NORMAL MMHG
STRESS BASELINE HR: 68 BPM
STRESS O2 SAT REST: 98 %
STRESS PEAK HR: 85 BPM
STRESS POST O2 SAT PEAK: 98 %
STRESS POST PEAK BP: 158 MMHG
STRESS ST DEPRESSION: 0 MM
STRESS/REST PERFUSION RATIO: 1.06
Z-SCORE OF INTERVENTRICULAR SEPTUM IN END DIASTOLE: 5.5
Z-SCORE OF LEFT VENTRICULAR DIMENSION IN END DIASTOLE: -5.01
Z-SCORE OF LEFT VENTRICULAR DIMENSION IN END SYSTOLE: -2.34
Z-SCORE OF LEFT VENTRICULAR POSTERIOR WALL IN END DIASTOLE: 5.61

## 2022-02-22 PROCEDURE — 93017 CV STRESS TEST TRACING ONLY: CPT

## 2022-02-22 PROCEDURE — G1004 CDSM NDSC: HCPCS

## 2022-02-22 PROCEDURE — 78452 HT MUSCLE IMAGE SPECT MULT: CPT

## 2022-02-22 PROCEDURE — 93016 CV STRESS TEST SUPVJ ONLY: CPT | Performed by: INTERNAL MEDICINE

## 2022-02-22 PROCEDURE — 93306 TTE W/DOPPLER COMPLETE: CPT

## 2022-02-22 PROCEDURE — 93018 CV STRESS TEST I&R ONLY: CPT | Performed by: INTERNAL MEDICINE

## 2022-02-22 PROCEDURE — 93306 TTE W/DOPPLER COMPLETE: CPT | Performed by: INTERNAL MEDICINE

## 2022-02-22 PROCEDURE — 78452 HT MUSCLE IMAGE SPECT MULT: CPT | Performed by: INTERNAL MEDICINE

## 2022-02-22 PROCEDURE — A9502 TC99M TETROFOSMIN: HCPCS

## 2022-02-22 RX ADMIN — REGADENOSON 0.4 MG: 0.08 INJECTION, SOLUTION INTRAVENOUS at 09:37

## 2022-02-23 ENCOUNTER — TELEPHONE (OUTPATIENT)
Dept: CARDIOLOGY CLINIC | Facility: CLINIC | Age: 57
End: 2022-02-23

## 2022-02-23 LAB
MAX DIASTOLIC BP: 90 MMHG
MAX HEART RATE: 85 BPM
MAX PREDICTED HEART RATE: 166 BPM
MAX. SYSTOLIC BP: 158 MMHG
PROTOCOL NAME: NORMAL
TARGET HR FORMULA: NORMAL
TEST INDICATION: NORMAL
TIME IN EXERCISE PHASE: NORMAL

## 2022-02-23 NOTE — TELEPHONE ENCOUNTER
Pt called, stated that he had his stress done yesterday and isn't scheduled to see Dr Maureen Betancur until March 8th   He current return to work date is set for March 1st  He wanted to know if we should push that out to April 1st?

## 2022-03-07 ENCOUNTER — OFFICE VISIT (OUTPATIENT)
Dept: CARDIOLOGY CLINIC | Facility: CLINIC | Age: 57
End: 2022-03-07
Payer: COMMERCIAL

## 2022-03-07 VITALS
BODY MASS INDEX: 37.35 KG/M2 | HEIGHT: 67 IN | OXYGEN SATURATION: 97 % | SYSTOLIC BLOOD PRESSURE: 142 MMHG | HEART RATE: 84 BPM | DIASTOLIC BLOOD PRESSURE: 88 MMHG | WEIGHT: 238 LBS

## 2022-03-07 DIAGNOSIS — I25.10 CORONARY ARTERY DISEASE INVOLVING NATIVE CORONARY ARTERY OF NATIVE HEART WITHOUT ANGINA PECTORIS: Primary | ICD-10-CM

## 2022-03-07 DIAGNOSIS — G47.33 OBSTRUCTIVE SLEEP APNEA ON CPAP: ICD-10-CM

## 2022-03-07 DIAGNOSIS — E78.2 MIXED HYPERLIPIDEMIA: ICD-10-CM

## 2022-03-07 DIAGNOSIS — I10 ESSENTIAL HYPERTENSION: ICD-10-CM

## 2022-03-07 DIAGNOSIS — Z99.89 OBSTRUCTIVE SLEEP APNEA ON CPAP: ICD-10-CM

## 2022-03-07 DIAGNOSIS — I48.19 PERSISTENT ATRIAL FIBRILLATION (HCC): ICD-10-CM

## 2022-03-07 DIAGNOSIS — E11.9 TYPE 2 DIABETES MELLITUS WITHOUT COMPLICATION, WITHOUT LONG-TERM CURRENT USE OF INSULIN (HCC): ICD-10-CM

## 2022-03-07 DIAGNOSIS — R06.00 DOE (DYSPNEA ON EXERTION): ICD-10-CM

## 2022-03-07 PROCEDURE — 3008F BODY MASS INDEX DOCD: CPT | Performed by: INTERNAL MEDICINE

## 2022-03-07 PROCEDURE — 3077F SYST BP >= 140 MM HG: CPT | Performed by: INTERNAL MEDICINE

## 2022-03-07 PROCEDURE — 3079F DIAST BP 80-89 MM HG: CPT | Performed by: INTERNAL MEDICINE

## 2022-03-07 PROCEDURE — 1036F TOBACCO NON-USER: CPT | Performed by: INTERNAL MEDICINE

## 2022-03-07 PROCEDURE — 99214 OFFICE O/P EST MOD 30 MIN: CPT | Performed by: INTERNAL MEDICINE

## 2022-03-07 NOTE — PROGRESS NOTES
Cardiology Follow Up    [facility] [place]  1965  [id number]  [facility] [place]  [address]  [address]  [place] [address] [phone]    1  Coronary artery disease involving native coronary artery of native heart without angina pectoris  Echo follow up/limited w/ contrast if indicated   2  Persistent atrial fibrillation ([address] )  Echo follow up/limited w/ contrast if indicated   3  Obstructive sleep apnea on CPAP  Echo follow up/limited w/ contrast if indicated   4  JACOBO (dyspnea on exertion)  Echo follow up/limited w/ contrast if indicated   5  Mixed hyperlipidemia  Echo follow up/limited w/ contrast if indicated   6  Type 2 diabetes mellitus without complication, without long-term current use of insulin (HCC)  Echo follow up/limited w/ contrast if indicated   7  Essential hypertension  Echo follow up/limited w/ contrast if indicated       Discussion/Summary:  Overall he has been doing better recently  Echocardiogram showed mild reduction in LV function likely due to coming off of the Corlanor  He is back on the medication now valsartan was increased in Lasix was started  Blood pressure manual recheck was 138/84  I counseled him on the need to significantly reduce salt in his diet I think they will bring his blood pressure down even further  From a heart failure standpoint I would expect him to continue to improve  He needs to find a better mask and treatment for sleep apnea  We discussed options  Follow-up with a limited echocardiogram in May or June  I will see him shortly thereafter  He is taking his Eliquis with no adverse bleeding  He has been maintaining sinus rhythm  Coronary disease is been stable  Lipids are doing well  I am fine with him returning to work full-time with no restrictions if he develops any new symptoms he will let me know        Interval History:   Very pleasant 65-year-old gentleman with a history of coronary disease and prior PCI of the LAD, hypertension, hyperlipidemia, atrial fibrillation status post ablation presents to establish care with me in the office  Functionally he has been doing well he continues to work as a  he is doing a fair amount of physical activity with no limitations  Since her last visit he has been doing well  Unfortunately his insurance stopped paying for the Corlanor and he was transition to higher beta-blockers but dropped his ejection fraction  He is now back on the medication doing much better  Denies any chest pain, shortness of breath, palpitations, lightheadedness, dizziness, or syncope  He has been taking all medications as prescribed    Problem List     CAD (coronary artery disease)    Erectile dysfunction of non-organic origin    Mixed hyperlipidemia    Essential hypertension    Moderate obesity    Obstructive sleep apnea on CPAP    Type 2 diabetes mellitus without complication, without long-term current use of insulin (HCC)      Lab Results   Component Value Date    HGBA1C 6 1 (H) 01/13/2022         Cervicalgia    Need for prophylactic vaccination against Streptococcus pneumoniae (pneumococcus)    Persistent atrial fibrillation (HCC)    Drug-induced erectile dysfunction    Seborrheic keratosis    Supraspinatus sprain, right, initial encounter    Trapezius strain, right, initial encounter    Need for prophylactic vaccination against diphtheria and tetanus        Past Medical History:   Diagnosis Date    Abnormal EKG 12/8/2017    ACE-inhibitor cough 10/13/2020    Breast mass in male 2/22/2021    CAD (coronary artery disease)     Diabetes ([address] )     Dizziness     Erectile dysfunction     GERD (gastroesophageal reflux disease)     Hyperlipidemia     Hypertension     Shortness of breath      Social History     Socioeconomic History    Marital status: /Civil Union     Spouse name: Not on file    Number of children: Not on file    Years of education: Not on file    Highest education level: Not on file   Occupational History    Not on file   Tobacco Use    Smoking status: Never Smoker    Smokeless tobacco: Former User   Vaping Use    Vaping Use: Never used   Substance and Sexual Activity    Alcohol use: No    Drug use: No    Sexual activity: Not on file   Other Topics Concern    Not on file   Social History Narrative    Not on file     Social Determinants of Health     Financial Resource Strain: Not on file   Food Insecurity: Not on file   Transportation Needs: Not on file   Physical Activity: Not on file   Stress: Not on file   Social Connections: Not on file   Intimate Partner Violence: Not on file   Housing Stability: Not on file      Family History   Problem Relation Age of Onset    Coronary artery disease Family     Diabetes Family     No Known Problems Father      Past Surgical History:   Procedure Laterality Date    CARDIAC CATHETERIZATION      ELECTRICAL CARDIOVERSION  2/4/2019            Current Outpatient Medications:     apixaban (ELIQUIS) 5 mg, Take 1 tablet (5 mg total) by mouth 2 (two) times a day, Disp: 180 tablet, Rfl: 3    aspirin 81 mg chewable tablet, Chew 1 tablet (81 mg total) daily, Disp: 100 tablet, Rfl: 1    cholecalciferol (VITAMIN D3) 1,000 units tablet, Take by mouth daily, Disp: , Rfl:     co-enzyme Q-10 30 MG capsule, Take 30 mg by mouth daily  , Disp: , Rfl:     dofetilide (TIKOSYN) 125 mcg capsule, TAKE 4 CAPSULES(500 MCG) BY MOUTH TWICE DAILY (Patient taking differently: Take 125 mcg by mouth 2 (two) times a day One capsule twice daily), Disp: 180 capsule, Rfl: 3    ergocalciferol (ERGOCALCIFEROL) 1 25 MG (70679 UT) capsule, Take 50,000 Units by mouth, Disp: , Rfl:     ezetimibe (ZETIA) 10 mg tablet, Take 10 mg by mouth daily, Disp: , Rfl:     furosemide (LASIX) 20 mg tablet, Take 1 tablet (20 mg total) by mouth daily, Disp: 90 tablet, Rfl: 3    ivabradine HCl (Corlanor) 5 MG tablet, Take 1 tablet (5 mg total) by mouth 2 (two) times a day, Disp: 60 tablet, Rfl: 11    metoprolol succinate (TOPROL-XL) 200 MG 24 hr tablet, Take 1 tablet (200 mg total) by mouth 2 (two) times a day, Disp: 180 tablet, Rfl: 3    ONETOUCH DELICA LANCETS 44W MISC, CHECK BID, Disp: , Rfl: 1    ONETOUCH VERIO test strip, CHECK TWICE DAILY, Disp: , Rfl: 3    OZEMPIC 1 MG/DOSE SOPN, INJECT 1 MG SQ Q WEEK, Disp: , Rfl: 4    rosuvastatin (CRESTOR) 20 MG tablet, Take 1 tablet (20 mg total) by mouth daily, Disp: 90 tablet, Rfl: 1    sildenafil (VIAGRA) 25 MG tablet, Take 1 tablet (25 mg total) by mouth daily as needed for erectile dysfunction, Disp: 20 tablet, Rfl: 1    SYNJARDY XR 12 5-1000 MG TB24, TK 1 T PO BID, Disp: , Rfl: 2    tamsulosin (FLOMAX) 0 4 mg, Take 1 capsule (0 4 mg total) by mouth daily with dinner, Disp: 30 capsule, Rfl: 3    valsartan (DIOVAN) 160 mg tablet, Take 1 tablet (160 mg total) by mouth daily (Patient taking differently: Take 320 mg by mouth daily  ), Disp: 180 tablet, Rfl: 3  Allergies   Allergen Reactions    Dye [Iodinated Diagnostic Agents] Hives and GI Intolerance     Definity - name of contrast      Aceinhibitors [Ace Inhibitors] Cough       Labs:     Chemistry        Component Value Date/Time    K 3 9 01/13/2022 0000     01/13/2022 0000    CO2 26 01/13/2022 0000    BUN 22 01/13/2022 0000    CREATININE 0 86 01/13/2022 0000    CREATININE 0 88 02/04/2019 0817        Component Value Date/Time    CALCIUM 8 7 01/13/2022 0000    ALKPHOS 81 01/13/2022 0000    AST 13 01/13/2022 0000    ALT 15 01/13/2022 0000            No results found for: CHOL  Lab Results   Component Value Date    HDL 33 (L) 01/13/2022    HDL 38 (L) 10/09/2020    HDL 35 (L) 09/23/2019     Lab Results   Component Value Date    LDLCALC 63 01/13/2022    LDLCALC 92 10/09/2020    LDLCALC 89 09/23/2019     Lab Results   Component Value Date    TRIG 115 01/13/2022    TRIG 110 10/09/2020    TRIG 78 09/23/2019     No results found for: CHOLHDL    Imaging: No results found  ECG:        ROS    Vitals:    03/07/22 1126   BP: 142/88   Pulse: 84   SpO2: 97%     Vitals:    03/07/22 1126   Weight: 108 kg (238 lb)     Height: 5' 7" (170 2 cm)   Body mass index is 37 28 kg/m²  Physical Exam:  Vital signs reviewed  General:  Alert and cooperative, appears stated age, no acute distress  HEENT:  PERRLA, EOMI, no scleral icterus, no conjunctival pallor  Neck:  No lymphadenopathy, no thyromegaly, no carotid bruits, no elevated JVP  Heart:  Regular rate and rhythm, normal S1/S2, no S3/S4, no murmur, rubs or gallops  PMI nondisplaced  Lungs:  Clear to auscultation bilaterally, no wheezes rales or rhonchi  Abdomen:  Soft, non-tender, positive bowel sounds, no rebound or guarding,   no organomegaly   Extremities:  Normal range of motion    No clubbing, cyanosis or edema   Vascular:  2+ pedal pulses  Skin:  No rashes or lesions on exposed skin  Neurologic:  Cranial nerves II-XII grossly intact without focal deficits  Psych:  Normal mood and affect

## 2022-06-01 ENCOUNTER — HOSPITAL ENCOUNTER (OUTPATIENT)
Dept: NON INVASIVE DIAGNOSTICS | Facility: CLINIC | Age: 57
Discharge: HOME | End: 2022-06-01
Payer: COMMERCIAL

## 2022-06-01 VITALS
WEIGHT: 238 LBS | SYSTOLIC BLOOD PRESSURE: 142 MMHG | HEART RATE: 84 BPM | DIASTOLIC BLOOD PRESSURE: 88 MMHG | BODY MASS INDEX: 37.35 KG/M2 | HEIGHT: 67 IN

## 2022-06-01 DIAGNOSIS — I10 ESSENTIAL HYPERTENSION: ICD-10-CM

## 2022-06-01 DIAGNOSIS — I25.10 CORONARY ARTERY DISEASE INVOLVING NATIVE CORONARY ARTERY OF NATIVE HEART WITHOUT ANGINA PECTORIS: ICD-10-CM

## 2022-06-01 DIAGNOSIS — I48.19 PERSISTENT ATRIAL FIBRILLATION (HCC): ICD-10-CM

## 2022-06-01 DIAGNOSIS — G47.33 OBSTRUCTIVE SLEEP APNEA ON CPAP: ICD-10-CM

## 2022-06-01 DIAGNOSIS — R06.00 DOE (DYSPNEA ON EXERTION): ICD-10-CM

## 2022-06-01 DIAGNOSIS — E78.2 MIXED HYPERLIPIDEMIA: ICD-10-CM

## 2022-06-01 DIAGNOSIS — E11.9 TYPE 2 DIABETES MELLITUS WITHOUT COMPLICATION, WITHOUT LONG-TERM CURRENT USE OF INSULIN (HCC): ICD-10-CM

## 2022-06-01 DIAGNOSIS — Z99.89 OBSTRUCTIVE SLEEP APNEA ON CPAP: ICD-10-CM

## 2022-06-01 PROCEDURE — 93308 TTE F-UP OR LMTD: CPT

## 2022-06-01 PROCEDURE — 93325 DOPPLER ECHO COLOR FLOW MAPG: CPT

## 2022-06-01 PROCEDURE — 93321 DOPPLER ECHO F-UP/LMTD STD: CPT

## 2022-06-02 LAB
AORTIC ROOT: 2.9 CM
APICAL FOUR CHAMBER EJECTION FRACTION: 41 %
FRACTIONAL SHORTENING: 27 % (ref 28–44)
INTERVENTRICULAR SEPTUM IN DIASTOLE (PARASTERNAL SHORT AXIS VIEW): 0.9 CM
INTERVENTRICULAR SEPTUM: 0.9 CM (ref 0.6–1.1)
LAAS-AP4: 16.2 CM2
LEFT INTERNAL DIMENSION IN SYSTOLE: 4.5 CM (ref 2.1–4)
LEFT VENTRICLE DIASTOLIC VOLUME (MOD BIPLANE): 161 ML
LEFT VENTRICLE SYSTOLIC VOLUME (MOD BIPLANE): 88 ML
LEFT VENTRICULAR INTERNAL DIMENSION IN DIASTOLE: 6.2 CM (ref 3.5–6)
LEFT VENTRICULAR POSTERIOR WALL IN END DIASTOLE: 1.1 CM
LEFT VENTRICULAR STROKE VOLUME: 101 ML
LV EF: 45 %
LVSV (TEICH): 101 ML
RIGHT ATRIUM AREA SYSTOLE A4C: 12.9 CM2
RIGHT VENTRICLE ID DIMENSION: 3.1 CM
SL CV LV EF: 45
SL CV PED ECHO LEFT VENTRICLE DIASTOLIC VOLUME (MOD BIPLANE) 2D: 192 ML
SL CV PED ECHO LEFT VENTRICLE SYSTOLIC VOLUME (MOD BIPLANE) 2D: 90 ML

## 2022-06-02 PROCEDURE — 93308 TTE F-UP OR LMTD: CPT | Performed by: INTERNAL MEDICINE

## 2022-06-02 PROCEDURE — 93321 DOPPLER ECHO F-UP/LMTD STD: CPT | Performed by: INTERNAL MEDICINE

## 2022-06-02 PROCEDURE — 93325 DOPPLER ECHO COLOR FLOW MAPG: CPT | Performed by: INTERNAL MEDICINE

## 2022-07-13 ENCOUNTER — OFFICE VISIT (OUTPATIENT)
Dept: CARDIOLOGY CLINIC | Facility: CLINIC | Age: 57
End: 2022-07-13
Payer: COMMERCIAL

## 2022-07-13 VITALS
BODY MASS INDEX: 36.57 KG/M2 | HEIGHT: 67 IN | DIASTOLIC BLOOD PRESSURE: 84 MMHG | WEIGHT: 233 LBS | SYSTOLIC BLOOD PRESSURE: 124 MMHG | HEART RATE: 76 BPM

## 2022-07-13 DIAGNOSIS — I48.19 PERSISTENT ATRIAL FIBRILLATION (HCC): Primary | ICD-10-CM

## 2022-07-13 DIAGNOSIS — E66.9 OBESITY (BMI 30-39.9): ICD-10-CM

## 2022-07-13 PROCEDURE — 93000 ELECTROCARDIOGRAM COMPLETE: CPT | Performed by: INTERNAL MEDICINE

## 2022-07-13 PROCEDURE — 3079F DIAST BP 80-89 MM HG: CPT | Performed by: INTERNAL MEDICINE

## 2022-07-13 PROCEDURE — 3074F SYST BP LT 130 MM HG: CPT | Performed by: INTERNAL MEDICINE

## 2022-07-13 PROCEDURE — 99214 OFFICE O/P EST MOD 30 MIN: CPT | Performed by: INTERNAL MEDICINE

## 2022-07-13 RX ORDER — EMPAGLIFLOZIN AND METFORMIN HYDROCHLORIDE 12.5; 1 MG/1; MG/1
TABLET ORAL
COMMUNITY
Start: 2022-07-11 | End: 2022-07-13 | Stop reason: ALTCHOICE

## 2022-07-13 NOTE — PROGRESS NOTES
Electrophysiology Office Note    Delton Sandifer [facility] [place]  1965  [id number]  [facility] [place] [facility] [id number]        Assessment/Plan     Primary diagnosis:   1  Persistent atrial fibrillation, asymptomatic    * patient follows up due to issues being off of corlenor and sinus tachycardia               * patient is currently in NSR W/ a HR of 76bpm, QTc of 450ms, on tikosyn 125mcg BID              * his CV is 4 (T2DM, HTN, CAD, NICMP) and he is on appropriate AC w/ eliquis 5mg PO BID               * he did undergo ELZBIETA w/ DCCV in October 2018 and is now s/p cryo PVI w/ tikosyn loading w/ need for 2nd DCCV on 2-4-19 due to returning to atrial fibrillation                * EF did improve from 30% to 45% in 4-2019 and to 55% in Jan 2021 but due to being off corlenor in June 2022 EF dropped to 45%, plan for echo in June  *Today we discussed non cardiac modifiable AF risk factors to prevent further substrate formation    1  HTN - controlled      2  T2DM - controlled  His goal is to come off of T2DM medications  See below     3  NORA - having issues with CPAP mask, still wearing 5 hours a night  Is getting entirely new machine at end of September  4  Alcohol intake- does not consume  5  Obesity - weight today is 233lbs, BMI is 36 49  Patient has lost almost 50lbs since 2019  Congratulated him on his efforts  He still has some weight to lose  Will refer to weight management + to the [employer] program with [employer]  He absolutely could benefit from dietary adjustment especially limiting the sandwiches  If he loses enough weight highly likely he will come off of HTN and diabetic medications  6  Tobacco use - does not use    * f/u with dr Jaleesa Rudd in October to make sure he is doing OK        2  CAD s/p PCI to LAD (2015)   3  HTN               * controlled      4  T2DM              * A1C in 1-2022 was 6 1     5   NORA on CPAP   6  Nonischemic cardiomyopathy              * EF in 4-2019 increased to 45% but in NSR improved to 55%, dropped to 45% in June due 2022 likely due to sinus tachycardia but now back on corlenor  * plan for echo in September to evaluate EF now that rate is controlled, will f/u with the result  Rhythm History:   Atrial fibrillation:   1  [facility] -    2  atrial fibrillation cryo PVI w/ tikosyn loading - 1-2019 by Dr Clarisa Walls  3  [facility] - 2-4-19    Atrial flutter:     SVT:     VT/VF/PVC:     Device history:   Pacemaker:    Defibrillator:    BIV PPM:    BIV ICD:    ILR:      Cardiac Testing:     ECHO: No results found for this or any previous visit  History of Present Illness     HPI/INTERVAL HISTORY: Sudha Zambrano is a 56 y o  male with history as above who presents to [facility] EP office today for follow up  Since last OV he is back on corlenor he is doing very well! Does not want to be on water pill anymore due to frequent urination  Struggling to limit the sandwiches and the salt  Does not exercise regularly  Review of Systems  ROS as noted above, otherwise 12 point review of systems was performed and is negative         Historical Information   Social History     Socioeconomic History    Marital status: /Civil Union     Spouse name: Not on file    Number of children: Not on file    Years of education: Not on file    Highest education level: Not on file   Occupational History    Not on file   Tobacco Use    Smoking status: Never Smoker    Smokeless tobacco: Former User     Quit date: 9/7/2014   Vaping Use    Vaping Use: Never used   Substance and Sexual Activity    Alcohol use: No    Drug use: No    Sexual activity: Not on file   Other Topics Concern    Not on file   Social History Narrative    Not on file     Social Determinants of Health     Financial Resource Strain: Not on file   Food Insecurity: Not on file   Transportation Needs: Not on file   Physical Activity: Not on file Stress: Not on file   Social Connections: Not on file   Intimate Partner Violence: Not on file   Housing Stability: Not on file     Past Medical History:   Diagnosis Date    Abnormal EKG 12/8/2017    ACE-inhibitor cough 10/13/2020    Breast mass in male 2/22/2021    CAD (coronary artery disease)     Diabetes (HCC)     Dizziness     Erectile dysfunction     GERD (gastroesophageal reflux disease)     Hyperlipidemia     Hypertension     Shortness of breath      Past Surgical History:   Procedure Laterality Date    CARDIAC CATHETERIZATION      ELECTRICAL CARDIOVERSION  2/4/2019          Social History     Substance and Sexual Activity   Alcohol Use No     Social History     Substance and Sexual Activity   Drug Use No     Social History     Tobacco Use   Smoking Status Never Smoker   Smokeless Tobacco Former User    Quit date: 9/7/2014     Family History   Problem Relation Age of Onset    Coronary artery disease Family     Diabetes Family     No Known Problems Father        Meds/Allergies       Current Outpatient Medications:     apixaban (ELIQUIS) 5 mg, Take 1 tablet (5 mg total) by mouth 2 (two) times a day, Disp: 180 tablet, Rfl: 3    aspirin 81 mg chewable tablet, Chew 1 tablet (81 mg total) daily, Disp: 100 tablet, Rfl: 1    cholecalciferol (VITAMIN D3) 1,000 units tablet, Take by mouth daily, Disp: , Rfl:     co-enzyme Q-10 30 MG capsule, Take 30 mg by mouth daily  , Disp: , Rfl:     dofetilide (TIKOSYN) 125 mcg capsule, TAKE 4 CAPSULES(500 MCG) BY MOUTH TWICE DAILY (Patient taking differently: Take 125 mcg by mouth 2 (two) times a day One capsule twice daily), Disp: 180 capsule, Rfl: 3    furosemide (LASIX) 20 mg tablet, Take 1 tablet (20 mg total) by mouth daily, Disp: 90 tablet, Rfl: 3    ivabradine HCl (Corlanor) 5 MG tablet, Take 1 tablet (5 mg total) by mouth 2 (two) times a day, Disp: 60 tablet, Rfl: 11    metoprolol succinate (TOPROL-XL) 200 MG 24 hr tablet, Take 1 tablet (200 mg total) by mouth 2 (two) times a day, Disp: 180 tablet, Rfl: 3    ONETOUCH DELICA LANCETS 45K MISC, CHECK BID, Disp: , Rfl: 1    ONETOUCH VERIO test strip, CHECK TWICE DAILY, Disp: , Rfl: 3    OZEMPIC 1 MG/DOSE SOPN, INJECT 1 MG SQ Q WEEK, Disp: , Rfl: 4    rosuvastatin (CRESTOR) 20 MG tablet, Take 1 tablet (20 mg total) by mouth daily, Disp: 90 tablet, Rfl: 1    sildenafil (VIAGRA) 25 MG tablet, Take 1 tablet (25 mg total) by mouth daily as needed for erectile dysfunction, Disp: 20 tablet, Rfl: 1    SYNJARDY XR 12 5-1000 MG TB24, TK 1 T PO BID, Disp: , Rfl: 2    tamsulosin (FLOMAX) 0 4 mg, Take 1 capsule (0 4 mg total) by mouth daily with dinner, Disp: 30 capsule, Rfl: 3    valsartan (DIOVAN) 160 mg tablet, Take 1 tablet (160 mg total) by mouth daily (Patient taking differently: Take 320 mg by mouth daily), Disp: 180 tablet, Rfl: 3    ergocalciferol (ERGOCALCIFEROL) 1 25 MG (09224 UT) capsule, Take 50,000 Units by mouth, Disp: , Rfl:     ezetimibe (ZETIA) 10 mg tablet, Take 10 mg by mouth daily, Disp: , Rfl:     Allergies   Allergen Reactions    Dye [Iodinated Diagnostic Agents] Hives and GI Intolerance     Definity - name of contrast      Aceinhibitors [Ace Inhibitors] Cough       Objective   Vitals: Blood pressure 124/84, pulse 76, height 5' 7" (1 702 m), weight 106 kg (233 lb)          Physical Exam      Labs:  Hospital Outpatient Visit on 06/01/2022   Component Date Value    LVPWd 06/01/2022 1 10     Left Atrium Area-systoli* 06/01/2022 16 2     IVSd 06/01/2022 9 33     LV DIASTOLIC VOLUME (MOD* 79/96/5504 192     LEFT VENTRICLE SYSTOLIC * 66/97/6522 90     Left ventricular stroke * 06/01/2022 101 00     EF 06/01/2022 45     A4C EF 06/01/2022 41     LVIDd 06/01/2022 6 20     IVS 06/01/2022 0 9     LVIDS 06/01/2022 4 50     FS 06/01/2022 27     Ao root 06/01/2022 2 90     RVID d 06/01/2022 3 1     LV Systolic Volume (BP) 73/83/0728 88     LV Diastolic Volume (BP) 80/57/1976 161     RAA A4C 06/01/2022 12 9     LVSV, 2D 06/01/2022 101     LV EF 06/01/2022 45        Imaging: I have personally reviewed pertinent reports

## 2022-07-14 ENCOUNTER — VBI (OUTPATIENT)
Dept: ADMINISTRATIVE | Facility: OTHER | Age: 57
End: 2022-07-14

## 2022-07-15 ENCOUNTER — VBI (OUTPATIENT)
Dept: ADMINISTRATIVE | Facility: OTHER | Age: 57
End: 2022-07-15

## 2022-07-20 ENCOUNTER — OFFICE VISIT (OUTPATIENT)
Dept: CARDIOLOGY CLINIC | Facility: CLINIC | Age: 57
End: 2022-07-20
Payer: COMMERCIAL

## 2022-07-20 VITALS
SYSTOLIC BLOOD PRESSURE: 152 MMHG | WEIGHT: 236 LBS | RESPIRATION RATE: 18 BRPM | DIASTOLIC BLOOD PRESSURE: 92 MMHG | BODY MASS INDEX: 37.04 KG/M2 | HEART RATE: 72 BPM | HEIGHT: 67 IN | OXYGEN SATURATION: 99 %

## 2022-07-20 DIAGNOSIS — R06.09 DOE (DYSPNEA ON EXERTION): ICD-10-CM

## 2022-07-20 DIAGNOSIS — N52.2 DRUG-INDUCED ERECTILE DYSFUNCTION: ICD-10-CM

## 2022-07-20 DIAGNOSIS — G47.33 OBSTRUCTIVE SLEEP APNEA ON CPAP: ICD-10-CM

## 2022-07-20 DIAGNOSIS — Z99.89 OBSTRUCTIVE SLEEP APNEA ON CPAP: ICD-10-CM

## 2022-07-20 DIAGNOSIS — E78.2 MIXED HYPERLIPIDEMIA: ICD-10-CM

## 2022-07-20 DIAGNOSIS — I10 ESSENTIAL HYPERTENSION: ICD-10-CM

## 2022-07-20 DIAGNOSIS — E11.9 TYPE 2 DIABETES MELLITUS WITHOUT COMPLICATION, WITHOUT LONG-TERM CURRENT USE OF INSULIN (HCC): ICD-10-CM

## 2022-07-20 DIAGNOSIS — I25.10 CORONARY ARTERY DISEASE INVOLVING NATIVE CORONARY ARTERY OF NATIVE HEART WITHOUT ANGINA PECTORIS: Primary | ICD-10-CM

## 2022-07-20 DIAGNOSIS — I48.19 PERSISTENT ATRIAL FIBRILLATION (HCC): ICD-10-CM

## 2022-07-20 LAB
ALBUMIN/CREAT UR: 70 MCG/MG CREAT
CHOLEST SERPL-MCNC: 104 MG/DL
CHOLEST/HDLC SERPL: 2.8 (CALC)
CREAT UR-MCNC: 64 MG/DL (ref 20–320)
HBA1C MFR BLD HPLC: 6.1 %
HDLC SERPL-MCNC: 37 MG/DL
LDLC SERPL CALC-MCNC: 43 MG/DL (CALC)
MICROALBUMIN UR-MCNC: 4.5 MG/DL
NONHDLC SERPL-MCNC: 67 MG/DL (CALC)
TRIGL SERPL-MCNC: 154 MG/DL
TSH SERPL-ACNC: 2.07 MIU/L (ref 0.4–4.5)

## 2022-07-20 PROCEDURE — 93000 ELECTROCARDIOGRAM COMPLETE: CPT | Performed by: INTERNAL MEDICINE

## 2022-07-20 PROCEDURE — 3060F POS MICROALBUMINURIA REV: CPT | Performed by: INTERNAL MEDICINE

## 2022-07-20 PROCEDURE — 3044F HG A1C LEVEL LT 7.0%: CPT | Performed by: INTERNAL MEDICINE

## 2022-07-20 PROCEDURE — 99214 OFFICE O/P EST MOD 30 MIN: CPT | Performed by: INTERNAL MEDICINE

## 2022-07-20 RX ORDER — SILDENAFIL 50 MG/1
50 TABLET, FILM COATED ORAL DAILY PRN
Qty: 10 TABLET | Refills: 6 | Status: SHIPPED | OUTPATIENT
Start: 2022-07-20

## 2022-07-20 NOTE — PROGRESS NOTES
Cardiology Follow Up    [facility] [place]  1965  [id number]  [facility] [place]  [address]  [address]  [place] [address] [phone]    1  Coronary artery disease involving native coronary artery of native heart without angina pectoris  POCT ECG   2  Obstructive sleep apnea on CPAP     3  Type 2 diabetes mellitus without complication, without long-term current use of insulin ([facility])     4  Persistent atrial fibrillation ([address] )     5  Essential hypertension     6  JACOBO (dyspnea on exertion)     7  Mixed hyperlipidemia     8  Drug-induced erectile dysfunction  sildenafil (VIAGRA) 50 MG tablet       Discussion/Summary:  Since her last visit he has been doing quite well coronary disease stable with no angina and he has an excellent functional capacity  Blood pressure came down on manual recheck to 138/84 will continue to follow and he has done a great job reducing salt intake  He is currently maintaining normal sinus rhythm sinus tachycardia has been corrected with Corlanor now that his insurance is paying for hopefully when he is on it for a couple of months ejection fraction will improve he has another echocardiogram scheduled for September  He is due for a carotid Doppler next year  Lipids are doing good continue on current dose of statin  He is now using CPAP on a regular basis  Interval History:   Very pleasant 70-year-old gentleman with a history of coronary disease and prior PCI of the LAD, hypertension, hyperlipidemia, atrial fibrillation status post ablation presents to establish care with me in the office  Functionally he has been doing well he continues to work as a  he is doing a fair amount of physical activity with no limitations       Overall he has been feeling well he continues to work as a  at [employer] he is walking up and down the hills and doing a lot of traveling with no exertional limitations  Recent ejection fraction was 45% now he is on the Corlenor for a few months hopefully the now that he is maintaining normal rhythm without tachycardia ejection fraction will improve  Denies any chest pain, palpitations, lightheadedness, dizziness, or syncope  There has been lower extremity edema, PND, orthopnea  He is tolerating anticoagulation well    Problem List     CAD (coronary artery disease)    Erectile dysfunction of non-organic origin    Mixed hyperlipidemia    Essential hypertension    Moderate obesity    Obstructive sleep apnea on CPAP    Type 2 diabetes mellitus without complication, without long-term current use of insulin (HCC)      Lab Results   Component Value Date    HGBA1C 6 1 (H) 01/13/2022         Cervicalgia    Need for prophylactic vaccination against Streptococcus pneumoniae (pneumococcus)    Persistent atrial fibrillation (HCC)    Drug-induced erectile dysfunction    Seborrheic keratosis    Supraspinatus sprain, right, initial encounter    Trapezius strain, right, initial encounter    Need for prophylactic vaccination against diphtheria and tetanus        Past Medical History:   Diagnosis Date    Abnormal EKG 12/8/2017    ACE-inhibitor cough 10/13/2020    Breast mass in male 2/22/2021    CAD (coronary artery disease)     Diabetes ([address] )     Dizziness     Erectile dysfunction     GERD (gastroesophageal reflux disease)     Hyperlipidemia     Hypertension     Shortness of breath      Social History     Socioeconomic History    Marital status: /Civil Union     Spouse name: Not on file    Number of children: Not on file    Years of education: Not on file    Highest education level: Not on file   Occupational History    Not on file   Tobacco Use    Smoking status: Never Smoker    Smokeless tobacco: Former User     Quit date: 9/7/2014   Vaping Use    Vaping Use: Never used   Substance and Sexual Activity    Alcohol use: No    Drug use: No    Sexual activity: Not on file   Other Topics Concern    Not on file   Social History Narrative    Not on file     Social Determinants of Health     Financial Resource Strain: Not on file   Food Insecurity: Not on file   Transportation Needs: Not on file   Physical Activity: Not on file   Stress: Not on file   Social Connections: Not on file   Intimate Partner Violence: Not on file   Housing Stability: Not on file      Family History   Problem Relation Age of Onset    Coronary artery disease Family     Diabetes Family     No Known Problems Father      Past Surgical History:   Procedure Laterality Date    CARDIAC CATHETERIZATION      ELECTRICAL CARDIOVERSION  2/4/2019            Current Outpatient Medications:     apixaban (ELIQUIS) 5 mg, Take 1 tablet (5 mg total) by mouth 2 (two) times a day, Disp: 180 tablet, Rfl: 3    aspirin 81 mg chewable tablet, Chew 1 tablet (81 mg total) daily, Disp: 100 tablet, Rfl: 1    cholecalciferol (VITAMIN D3) 1,000 units tablet, Take by mouth daily, Disp: , Rfl:     co-enzyme Q-10 30 MG capsule, Take 30 mg by mouth daily  , Disp: , Rfl:     dofetilide (TIKOSYN) 125 mcg capsule, TAKE 4 CAPSULES(500 MCG) BY MOUTH TWICE DAILY (Patient taking differently: Take 125 mcg by mouth 2 (two) times a day One capsule twice daily), Disp: 180 capsule, Rfl: 3    ergocalciferol (ERGOCALCIFEROL) 1 25 MG (82581 UT) capsule, Take 50,000 Units by mouth, Disp: , Rfl:     ezetimibe (ZETIA) 10 mg tablet, Take 10 mg by mouth daily, Disp: , Rfl:     furosemide (LASIX) 20 mg tablet, Take 1 tablet (20 mg total) by mouth daily, Disp: 90 tablet, Rfl: 3    ivabradine HCl (Corlanor) 5 MG tablet, Take 1 tablet (5 mg total) by mouth 2 (two) times a day, Disp: 60 tablet, Rfl: 11    metoprolol succinate (TOPROL-XL) 200 MG 24 hr tablet, Take 1 tablet (200 mg total) by mouth 2 (two) times a day, Disp: 180 tablet, Rfl: 3    ONETOUCH DELICA LANCETS 36Q MISC, CHECK BID, Disp: , Rfl: 1    ONETOUCH VERIO test strip, CHECK TWICE DAILY, Disp: , Rfl: 3    OZEMPIC 1 MG/DOSE SOPN, INJECT 1 MG SQ Q WEEK, Disp: , Rfl: 4    rosuvastatin (CRESTOR) 20 MG tablet, Take 1 tablet (20 mg total) by mouth daily, Disp: 90 tablet, Rfl: 1    sildenafil (VIAGRA) 50 MG tablet, Take 1 tablet (50 mg total) by mouth daily as needed for erectile dysfunction, Disp: 10 tablet, Rfl: 6    SYNJARDY XR 12 5-1000 MG TB24, TK 1 T PO BID, Disp: , Rfl: 2    tamsulosin (FLOMAX) 0 4 mg, Take 1 capsule (0 4 mg total) by mouth daily with dinner, Disp: 30 capsule, Rfl: 3    valsartan (DIOVAN) 160 mg tablet, Take 1 tablet (160 mg total) by mouth daily (Patient taking differently: Take 320 mg by mouth daily), Disp: 180 tablet, Rfl: 3  Allergies   Allergen Reactions    Dye [Iodinated Diagnostic Agents] Hives and GI Intolerance     Definity - name of contrast      Aceinhibitors [Ace Inhibitors] Cough       Labs:     Chemistry        Component Value Date/Time    K 3 9 01/13/2022 0000     01/13/2022 0000    CO2 26 01/13/2022 0000    BUN 22 01/13/2022 0000    CREATININE 0 86 01/13/2022 0000    CREATININE 0 88 02/04/2019 0817        Component Value Date/Time    CALCIUM 8 7 01/13/2022 0000    ALKPHOS 81 01/13/2022 0000    AST 13 01/13/2022 0000    ALT 15 01/13/2022 0000            No results found for: CHOL  Lab Results   Component Value Date    HDL 33 (L) 01/13/2022    HDL 38 (L) 10/09/2020    HDL 35 (L) 09/23/2019     Lab Results   Component Value Date    LDLCALC 63 01/13/2022    LDLCALC 92 10/09/2020    LDLCALC 89 09/23/2019     Lab Results   Component Value Date    TRIG 115 01/13/2022    TRIG 110 10/09/2020    TRIG 78 09/23/2019     No results found for: CHOLHDL    Imaging: No results found  ECG:        ROS    Vitals:    07/20/22 0816   BP: 152/92   Pulse: 72   Resp: 18   SpO2: 99%     Vitals:    07/20/22 0816   Weight: 107 kg (236 lb)     Height: 5' 7" (170 2 cm)   Body mass index is 36 96 kg/m²      Physical Exam:  Vital signs reviewed  General: Alert and cooperative, appears stated age, no acute distress  HEENT:  PERRLA, EOMI, no scleral icterus, no conjunctival pallor  Neck:  No lymphadenopathy, no thyromegaly, no carotid bruits, no elevated JVP  Heart:  Regular rate and rhythm, normal S1/S2, no S3/S4, no murmur, rubs or gallops  PMI nondisplaced  Lungs:  Clear to auscultation bilaterally, no wheezes rales or rhonchi  Abdomen:  Soft, non-tender, positive bowel sounds, no rebound or guarding,   no organomegaly   Extremities:  Normal range of motion    No clubbing, cyanosis or edema   Vascular:  2+ pedal pulses  Skin:  No rashes or lesions on exposed skin  Neurologic:  Cranial nerves II-XII grossly intact without focal deficits  Psych:  Normal mood and affect

## 2022-07-26 ENCOUNTER — TELEPHONE (OUTPATIENT)
Dept: CARDIOLOGY CLINIC | Facility: CLINIC | Age: 57
End: 2022-07-26

## 2022-07-26 DIAGNOSIS — I48.19 PERSISTENT ATRIAL FIBRILLATION (HCC): ICD-10-CM

## 2022-07-26 RX ORDER — METOPROLOL SUCCINATE 50 MG/1
150 TABLET, EXTENDED RELEASE ORAL 2 TIMES DAILY
Qty: 270 TABLET | Refills: 1 | Status: SHIPPED | OUTPATIENT
Start: 2022-07-26 | End: 2022-07-28 | Stop reason: SDUPTHER

## 2022-07-28 DIAGNOSIS — I48.19 PERSISTENT ATRIAL FIBRILLATION (HCC): ICD-10-CM

## 2022-07-28 RX ORDER — METOPROLOL SUCCINATE 100 MG/1
150 TABLET, EXTENDED RELEASE ORAL 2 TIMES DAILY
Qty: 270 TABLET | Refills: 1 | Status: SHIPPED | OUTPATIENT
Start: 2022-07-28

## 2022-08-29 ENCOUNTER — TELEPHONE (OUTPATIENT)
Dept: FAMILY MEDICINE CLINIC | Facility: CLINIC | Age: 57
End: 2022-08-29

## 2022-08-29 NOTE — TELEPHONE ENCOUNTER
Pt called and wanted to inform Dr Emma Alicia he took an at home COVID test and tested positive  His employer told him to quarantine for ten days  Pt did not want to make an appt with Dr Emma Alicia  Just make him aware

## 2022-10-12 PROBLEM — H61.23 BILATERAL IMPACTED CERUMEN: Status: RESOLVED | Noted: 2021-08-26 | Resolved: 2022-10-12

## 2022-10-12 PROBLEM — R05.9 COUGH: Status: RESOLVED | Noted: 2022-01-24 | Resolved: 2022-10-12

## 2022-11-15 RX ORDER — TERAZOSIN 1 MG/1
1 CAPSULE ORAL
COMMUNITY
Start: 2022-08-19 | End: 2023-08-19

## 2022-11-16 ENCOUNTER — OFFICE VISIT (OUTPATIENT)
Dept: FAMILY MEDICINE CLINIC | Facility: CLINIC | Age: 57
End: 2022-11-16

## 2022-11-16 VITALS
OXYGEN SATURATION: 97 % | HEART RATE: 70 BPM | HEIGHT: 67 IN | BODY MASS INDEX: 37.83 KG/M2 | WEIGHT: 241 LBS | DIASTOLIC BLOOD PRESSURE: 82 MMHG | SYSTOLIC BLOOD PRESSURE: 142 MMHG

## 2022-11-16 DIAGNOSIS — I48.19 PERSISTENT ATRIAL FIBRILLATION (HCC): ICD-10-CM

## 2022-11-16 DIAGNOSIS — Z00.00 ENCOUNTER FOR ANNUAL PHYSICAL EXAM: ICD-10-CM

## 2022-11-16 DIAGNOSIS — E78.2 MIXED HYPERLIPIDEMIA: ICD-10-CM

## 2022-11-16 DIAGNOSIS — E11.69 TYPE 2 DIABETES MELLITUS WITH OTHER SPECIFIED COMPLICATION, WITHOUT LONG-TERM CURRENT USE OF INSULIN (HCC): ICD-10-CM

## 2022-11-16 DIAGNOSIS — Z12.5 PROSTATE CANCER SCREENING: Primary | ICD-10-CM

## 2022-11-16 NOTE — PROGRESS NOTES
Subjective:      Patient ID: Kerrie Dow is a 56 y o  male  65-year-old male with past medical history of coronary artery disease, diabetes mellitus type 2, hypertension, hyperlipidemia presents for annual physical examination and follow-up of chronic conditions  Patient did have follow-up with Cardiology  Hemoglobin A1c of 6 1%, total cholesterol 104, HDL 37, LDL 43, urine is negative for microalbumin  Overall he has been feeling well  Patient does have follow-up with multiple specialists  He has been trying to remain physically active  He is working longer shifts for [employer]      Past Medical History:   Diagnosis Date   • Abnormal EKG 12/8/2017   • ACE-inhibitor cough 10/13/2020   • Breast mass in male 2/22/2021   • CAD (coronary artery disease)    • Diabetes (HCC)    • Dizziness    • Erectile dysfunction    • GERD (gastroesophageal reflux disease)    • Hyperlipidemia    • Hypertension    • Shortness of breath        Family History   Problem Relation Age of Onset   • Coronary artery disease Family    • Diabetes Family    • No Known Problems Father        Past Surgical History:   Procedure Laterality Date   • CARDIAC CATHETERIZATION     • ELECTRICAL CARDIOVERSION  2/4/2019             reports that he has never smoked  He quit smokeless tobacco use about 8 years ago  He reports that he does not drink alcohol and does not use drugs        Current Outpatient Medications:   •  apixaban (ELIQUIS) 5 mg, Take 1 tablet (5 mg total) by mouth 2 (two) times a day, Disp: 180 tablet, Rfl: 3  •  aspirin 81 mg chewable tablet, Chew 1 tablet (81 mg total) daily, Disp: 100 tablet, Rfl: 1  •  co-enzyme Q-10 30 MG capsule, Take 30 mg by mouth daily  , Disp: , Rfl:   •  dofetilide (TIKOSYN) 125 mcg capsule, TAKE 4 CAPSULES(500 MCG) BY MOUTH TWICE DAILY (Patient taking differently: Take 125 mcg by mouth 2 (two) times a day One capsule twice daily), Disp: 180 capsule, Rfl: 3  •  furosemide (LASIX) 20 mg tablet, Take 1 tablet (20 mg total) by mouth daily, Disp: 90 tablet, Rfl: 3  •  ivabradine HCl (Corlanor) 5 MG tablet, Take 1 tablet (5 mg total) by mouth 2 (two) times a day, Disp: 60 tablet, Rfl: 11  •  metoprolol succinate (TOPROL-XL) 100 mg 24 hr tablet, Take 1 5 tablets (150 mg total) by mouth 2 (two) times a day, Disp: 270 tablet, Rfl: 1  •  ONETOUCH DELICA LANCETS 87R MISC, CHECK BID, Disp: , Rfl: 1  •  ONETOUCH VERIO test strip, CHECK TWICE DAILY, Disp: , Rfl: 3  •  OZEMPIC 1 MG/DOSE SOPN, INJECT 1 MG SQ Q WEEK, Disp: , Rfl: 4  •  rosuvastatin (CRESTOR) 20 MG tablet, Take 1 tablet (20 mg total) by mouth daily, Disp: 90 tablet, Rfl: 1  •  sildenafil (VIAGRA) 50 MG tablet, Take 1 tablet (50 mg total) by mouth daily as needed for erectile dysfunction, Disp: 10 tablet, Rfl: 6  •  SYNJARDY XR 12 5-1000 MG TB24, TK 1 T PO BID, Disp: , Rfl: 2  •  tamsulosin (FLOMAX) 0 4 mg, Take 1 capsule (0 4 mg total) by mouth daily with dinner, Disp: 30 capsule, Rfl: 3  •  terazosin (HYTRIN) 1 mg capsule, Take 1 mg by mouth, Disp: , Rfl:   •  valsartan (DIOVAN) 160 mg tablet, Take 1 tablet (160 mg total) by mouth daily (Patient taking differently: Take 320 mg by mouth daily), Disp: 180 tablet, Rfl: 3  •  ergocalciferol (ERGOCALCIFEROL) 1 25 MG (24363 UT) capsule, Take 50,000 Units by mouth, Disp: , Rfl:   •  ezetimibe (ZETIA) 10 mg tablet, Take 10 mg by mouth daily, Disp: , Rfl:     The following portions of the patient's history were reviewed and updated as appropriate: allergies, current medications, past family history, past medical history, past social history, past surgical history and problem list     Review of Systems   Constitutional: Positive for unexpected weight change (Weight gain)  HENT: Negative  Eyes: Negative  Respiratory: Negative  Cardiovascular: Negative  Gastrointestinal: Negative  Endocrine: Negative  Genitourinary: Negative  Erectile dysfunction   Musculoskeletal: Negative  Skin: Negative  Allergic/Immunologic: Negative  Neurological: Negative  Hematological: Negative  Psychiatric/Behavioral: Negative  All other systems reviewed and are negative  Objective:    /82   Pulse 70   Ht 5' 7" (1 702 m)   Wt 109 kg (241 lb)   SpO2 97%   BMI 37 75 kg/m²      Physical Exam  Vitals and nursing note reviewed  Constitutional:       General: He is not in acute distress  Appearance: Normal appearance  He is well-developed  He is obese  He is not ill-appearing  HENT:      Head: Normocephalic and atraumatic  Right Ear: Tympanic membrane, ear canal and external ear normal       Left Ear: Tympanic membrane, ear canal and external ear normal    Eyes:      Extraocular Movements: Extraocular movements intact  Conjunctiva/sclera: Conjunctivae normal       Pupils: Pupils are equal, round, and reactive to light  Cardiovascular:      Rate and Rhythm: Normal rate and regular rhythm  Pulses: Normal pulses  no weak pulses          Dorsalis pedis pulses are 2+ on the right side and 2+ on the left side  Posterior tibial pulses are 2+ on the right side and 2+ on the left side  Heart sounds: Normal heart sounds  No murmur heard  Pulmonary:      Effort: Pulmonary effort is normal       Breath sounds: Normal breath sounds  Abdominal:      General: Abdomen is flat  Bowel sounds are normal       Palpations: Abdomen is soft  Musculoskeletal:         General: Normal range of motion  Cervical back: Normal range of motion and neck supple  Right lower leg: No edema  Left lower leg: No edema  Feet:      Right foot:      Skin integrity: No ulcer, skin breakdown, erythema, warmth, callus or dry skin  Left foot:      Skin integrity: No ulcer, skin breakdown, erythema, warmth, callus or dry skin  Skin:     General: Skin is warm and dry  Neurological:      General: No focal deficit present        Mental Status: He is alert and oriented to person, place, and time  Psychiatric:         Mood and Affect: Mood normal          Behavior: Behavior normal          Thought Content: Thought content normal          Judgment: Judgment normal            Recent Results (from the past 4032 hour(s))   Hemoglobin A1C    Collection Time: 07/20/22 12:00 AM   Result Value Ref Range    Hemoglobin A1C 6 1    HEMOGLOBIN A1C    Collection Time: 07/20/22  7:04 AM   Result Value Ref Range    Hgb A1c 6 1 (H) <5 7 % of total Hgb   Lipid panel    Collection Time: 07/20/22  7:10 AM   Result Value Ref Range    Total Cholesterol 104 <200 mg/dL    HDL 37 (L) > OR = 40 mg/dL    Triglycerides 154 (H) <150 mg/dL    LDL Calculated 43 mg/dL (calc)    Chol HDLC Ratio 2 8 <5 0 (calc)    Non-HDL Cholesterol 67 <130 mg/dL (calc)   Microalbumin, Random Urine (W/Creatinine)    Collection Time: 07/20/22  7:10 AM   Result Value Ref Range    Creatinine, Urine 64 20 - 320 mg/dL    Microalbum  ,U,Random 4 5 See Note: mg/dL    Microalb/Creat Ratio 70 (H) <30 mcg/mg creat   TSH, 3rd generation with Free T4 reflex    Collection Time: 07/20/22  7:10 AM   Result Value Ref Range    TSH W/RFX TO FREE T4 2 07 0 40 - 4 50 mIU/L     Assessment/Plan:    Type 2 diabetes mellitus with other specified complication, without long-term current use of insulin ([facility] [address] )  This is managed by his endocrinologist   Hemoglobin A1c remains very well controlled at 6 1%  Continue same medications  Follow-up with endocrinology as scheduled    Lab Results   Component Value Date    HGBA1C 6 1 (H) 07/20/2022       Persistent atrial fibrillation ([facility] [address] )  Managed by Cardiology    Essential hypertension  Adequately controlled and managed by Cardiology    Prostate cancer screening  Screening PSA with next set of labs    Mixed hyperlipidemia  Remains on Crestor and Zetia  Goal LDL less than 70  Follow-up with cardiology and endocrinology    Repeat lipid panel to be done in 6 months    Encounter for annual physical exam  Annual physical examination performed    Drug-induced erectile dysfunction  Continue on p r n  Viagra          Problem List Items Addressed This Visit        Endocrine    Type 2 diabetes mellitus with other specified complication, without long-term current use of insulin ([facility] )     This is managed by his endocrinologist   Hemoglobin A1c remains very well controlled at 6 1%  Continue same medications  Follow-up with endocrinology as scheduled    Lab Results   Component Value Date    HGBA1C 6 1 (H) 07/20/2022               Cardiovascular and Mediastinum    Persistent atrial fibrillation ([facility] )     Managed by Cardiology            Other    Encounter for annual physical exam     Annual physical examination performed         Relevant Orders    influenza vaccine, quadrivalent, recombinant, PF, 0 5 mL, for patients 18 yr+ (FLUBLOK) (Completed)    Mixed hyperlipidemia     Remains on Crestor and Zetia  Goal LDL less than 70  Follow-up with cardiology and endocrinology  Repeat lipid panel to be done in 6 months         Prostate cancer screening - Primary     Screening PSA with next set of labs         Relevant Orders    PSA, Total Screen         Patient's shoes and socks removed  Right Foot/Ankle   Right Foot Inspection  Skin Exam: skin normal and skin intact  No dry skin, no warmth, no callus, no erythema, no maceration, no abnormal color, no pre-ulcer, no ulcer and no callus  Toe Exam: ROM and strength within normal limits  Sensory   Vibration: intact  Proprioception: intact  Monofilament testing: intact    Vascular  Capillary refills: < 3 seconds  The right DP pulse is 2+  The right PT pulse is 2+  Left Foot/Ankle  Left Foot Inspection  Skin Exam: skin normal and skin intact  No dry skin, no warmth, no erythema, no maceration, normal color, no pre-ulcer, no ulcer and no callus  Toe Exam: ROM and strength within normal limits       Sensory   Vibration: intact  Proprioception: intact  Monofilament testing: intact    Vascular  Capillary refills: < 3 seconds  The left DP pulse is 2+  The left PT pulse is 2+       Assign Risk Category  No deformity present  No loss of protective sensation  No weak pulses  Risk: 0

## 2022-11-18 NOTE — ASSESSMENT & PLAN NOTE
This is managed by his endocrinologist   Hemoglobin A1c remains very well controlled at 6 1%  Continue same medications    Follow-up with endocrinology as scheduled    Lab Results   Component Value Date    HGBA1C 6 1 (H) 07/20/2022

## 2022-11-18 NOTE — ASSESSMENT & PLAN NOTE
Remains on Crestor and Zetia  Goal LDL less than 70  Follow-up with cardiology and endocrinology    Repeat lipid panel to be done in 6 months

## 2022-12-03 DIAGNOSIS — I48.19 PERSISTENT ATRIAL FIBRILLATION (HCC): ICD-10-CM

## 2022-12-06 ENCOUNTER — TELEPHONE (OUTPATIENT)
Dept: CARDIOLOGY CLINIC | Facility: CLINIC | Age: 57
End: 2022-12-06

## 2022-12-07 ENCOUNTER — OFFICE VISIT (OUTPATIENT)
Dept: SLEEP CENTER | Facility: CLINIC | Age: 57
End: 2022-12-07

## 2022-12-07 VITALS
DIASTOLIC BLOOD PRESSURE: 98 MMHG | OXYGEN SATURATION: 96 % | BODY MASS INDEX: 37.2 KG/M2 | WEIGHT: 237 LBS | SYSTOLIC BLOOD PRESSURE: 148 MMHG | HEIGHT: 67 IN | HEART RATE: 90 BPM

## 2022-12-07 DIAGNOSIS — G47.33 OBSTRUCTIVE SLEEP APNEA (ADULT) (PEDIATRIC): ICD-10-CM

## 2022-12-07 NOTE — PROGRESS NOTES
Progress Note - Sleep Mikal Mata :1965 MRN: [id number]      Reason for Visit:  56 y  o male here for annual follow-up    Assessment:  Doing well on current therapy of CPAP 15 cm for severe NORA (AHI = 32 5)  The patient recently lost approximately 50 pounds  Plan:  Continue same    Follow up: One year    History of Present Illness:  History of NORA on PAP therapy  Fully compliant and deriving benefit        Review of Systems      Genitourinary need to urinate more than twice a night   Cardiology none   Gastrointestinal none   Neurology none   Constitutional none   Integumentary none   Psychiatry none   Musculoskeletal muscle aches, back pain and sciatica   Pulmonary none   ENT none   Endocrine frequent urination   Hematological none         I have reviewed and updated the review of systems as necessary      Historical Information    Past Medical History:   Past Medical History:   Diagnosis Date   • Abnormal EKG 2017   • ACE-inhibitor cough 10/13/2020   • Breast mass in male 2021   • CAD (coronary artery disease)    • Diabetes (HCC)    • Dizziness    • Erectile dysfunction    • GERD (gastroesophageal reflux disease)    • Hyperlipidemia    • Hypertension    • Shortness of breath          Past Surgical History:   Past Surgical History:   Procedure Laterality Date   • CARDIAC CATHETERIZATION     • ELECTRICAL CARDIOVERSION  2019            Social History:   Social History     Socioeconomic History   • Marital status: /Civil Union     Spouse name: None   • Number of children: None   • Years of education: None   • Highest education level: None   Occupational History   • None   Tobacco Use   • Smoking status: Never   • Smokeless tobacco: Former     Quit date: 2014   Vaping Use   • Vaping Use: Never used   Substance and Sexual Activity   • Alcohol use: No   • Drug use: No   • Sexual activity: None   Other Topics Concern   • None   Social History Narrative   • None     Social Determinants of Health     Financial Resource Strain: Not on file   Food Insecurity: Not on file   Transportation Needs: Not on file   Physical Activity: Not on file   Stress: Not on file   Social Connections: Not on file   Intimate Partner Violence: Not on file   Housing Stability: Not on file       Family History:   Family History   Problem Relation Age of Onset   • Coronary artery disease Family    • Diabetes Family    • No Known Problems Father        Medications/Allergies:      Current Outpatient Medications:   •  apixaban (ELIQUIS) 5 mg, Take 1 tablet (5 mg total) by mouth 2 (two) times a day, Disp: 180 tablet, Rfl: 3  •  dofetilide (TIKOSYN) 125 mcg capsule, TAKE 4 CAPSULES(500 MCG) BY MOUTH TWICE DAILY (Patient taking differently: Take 125 mcg by mouth 2 (two) times a day One capsule twice daily), Disp: 180 capsule, Rfl: 3  •  furosemide (LASIX) 20 mg tablet, Take 1 tablet (20 mg total) by mouth daily, Disp: 90 tablet, Rfl: 3  •  ivabradine HCl (Corlanor) 5 MG tablet, Take 1 tablet (5 mg total) by mouth 2 (two) times a day, Disp: 60 tablet, Rfl: 11  •  metoprolol succinate (TOPROL-XL) 100 mg 24 hr tablet, Take 1 5 tablets (150 mg total) by mouth 2 (two) times a day, Disp: 270 tablet, Rfl: 1  •  ONETOUCH DELICA LANCETS 24L MISC, CHECK BID, Disp: , Rfl: 1  •  ONETOUCH VERIO test strip, CHECK TWICE DAILY, Disp: , Rfl: 3  •  OZEMPIC 1 MG/DOSE SOPN, INJECT 1 MG SQ Q WEEK, Disp: , Rfl: 4  •  rosuvastatin (CRESTOR) 20 MG tablet, Take 1 tablet (20 mg total) by mouth daily, Disp: 90 tablet, Rfl: 1  •  sildenafil (VIAGRA) 50 MG tablet, Take 1 tablet (50 mg total) by mouth daily as needed for erectile dysfunction, Disp: 10 tablet, Rfl: 6  •  SYNJARDY XR 12 5-1000 MG TB24, TK 1 T PO BID, Disp: , Rfl: 2  •  tamsulosin (FLOMAX) 0 4 mg, Take 1 capsule (0 4 mg total) by mouth daily with dinner, Disp: 30 capsule, Rfl: 3  •  valsartan (DIOVAN) 160 mg tablet, Take 1 tablet (160 mg total) by mouth daily (Patient taking differently: Take 320 mg by mouth daily), Disp: 180 tablet, Rfl: 3  •  aspirin 81 mg chewable tablet, Chew 1 tablet (81 mg total) daily (Patient not taking: Reported on 12/7/2022), Disp: 100 tablet, Rfl: 1  •  co-enzyme Q-10 30 MG capsule, Take 30 mg by mouth daily   (Patient not taking: Reported on 12/7/2022), Disp: , Rfl:   •  ergocalciferol (ERGOCALCIFEROL) 1 25 MG (63122 UT) capsule, Take 50,000 Units by mouth, Disp: , Rfl:   •  ezetimibe (ZETIA) 10 mg tablet, Take 10 mg by mouth daily, Disp: , Rfl:   •  terazosin (HYTRIN) 1 mg capsule, Take 1 mg by mouth (Patient not taking: Reported on 12/7/2022), Disp: , Rfl:           Objective      Vital Signs:   Vitals:    12/07/22 1154   BP: 148/98   Pulse: 90   SpO2: 96%     Meridian Sleepiness Scale:          Physical Exam:    General: Alert, appropriate, cooperative, overweight    Head: NC/AT    Skin: Warm, dry    Neuro: No motor abnormalities, cranial nerves appear intact    Extremity: No clubbing, cyanosis      DME Provider: [facility]        Counseling / Coordination of Care   I have spent 15 minutes with the patient today in which greater than 50% of this time was spent in counseling/coordination of care regarding: equipment and compliance  Board Certified Sleep Specialist    Portions of the record may have been created with voice recognition software  Occasional wrong word or "sound a like" substitutions may have occurred due to the inherent limitations of voice recognition software  Read the chart carefully and recognize, using context, where substitutions have occurred

## 2022-12-08 RX ORDER — DOFETILIDE 0.12 MG/1
125 CAPSULE ORAL 2 TIMES DAILY
Qty: 180 CAPSULE | Refills: 3 | Status: SHIPPED | OUTPATIENT
Start: 2022-12-08

## 2022-12-19 ENCOUNTER — VBI (OUTPATIENT)
Dept: ADMINISTRATIVE | Facility: OTHER | Age: 57
End: 2022-12-19

## 2022-12-25 ENCOUNTER — TELEPHONE (OUTPATIENT)
Dept: OTHER | Facility: HOSPITAL | Age: 57
End: 2022-12-25

## 2022-12-25 ENCOUNTER — NURSE TRIAGE (OUTPATIENT)
Dept: OTHER | Facility: OTHER | Age: 57
End: 2022-12-25

## 2022-12-25 DIAGNOSIS — N39.0 URINARY TRACT INFECTION WITH HEMATURIA, SITE UNSPECIFIED: Primary | ICD-10-CM

## 2022-12-25 DIAGNOSIS — R31.9 URINARY TRACT INFECTION WITH HEMATURIA, SITE UNSPECIFIED: Primary | ICD-10-CM

## 2022-12-25 DIAGNOSIS — N50.819 PAIN IN TESTICLE, UNSPECIFIED LATERALITY: Primary | ICD-10-CM

## 2022-12-25 NOTE — TELEPHONE ENCOUNTER
Regarding: Hematuria and testicle pain  ----- Message from Vu Mckeon sent at 12/25/2022  1:24 PM EST -----  "I have blood in my urine and pain in my testicle "

## 2022-12-25 NOTE — TELEPHONE ENCOUNTER
Reason for Disposition  • Blood in urine (red, pink, or tea-colored)    Answer Assessment - Initial Assessment Questions  1  LOCATION and RADIATION: "Where is the pain located?"       Lt testicle no radiation and pelvis  2  QUALITY: "What does the pain feel like?"  (e g , sharp, dull, aching, burning)      Pain in left testicle  3  SEVERITY: "How bad is the pain?"  (Scale 1-10; or mild, moderate, severe)    - MILD (1-3): doesn't interfere with normal activities     - MODERATE (4-7): interferes with normal activities (e g , work or school) or awakens from sleep    - SEVERE (8-10): excruciating pain, unable to do any normal activities, difficulty walking   5/10  4  ONSET: "When did the pain start?"      Two days ago  5  PATTERN: "Does it come and go, or has it been constant since it started?"      The pain is intermitent  6  SCROTAL APPEARANCE: "What does the scrotum look like?" "Is there any swelling or redness?"       The scrotum looks normal  7  HERNIA: "Has a doctor ever told you that you have a hernia?"      No hernia diagnosis  8  OTHER SYMPTOMS: "Do you have any other symptoms?" (e g , fever, abdominal pain, vomiting, difficulty passing urine)     Blood in urine twice yesterday   None today    Protocols used: SCROTAL PAIN-ADULT-

## 2022-12-25 NOTE — TELEPHONE ENCOUNTER
Patient called into call center with complaints of testicular discomfort and urinary frequency  Urine testing was ordered  ER precautions reviewed  Ultrasound scrotum and testicles was ordered  Was seen over a year ago with similar complaints and ultrasound was ordered but not yet completed    Please schedule follow-up in the next few weeks

## 2022-12-27 ENCOUNTER — APPOINTMENT (OUTPATIENT)
Dept: LAB | Age: 57
End: 2022-12-27

## 2022-12-27 DIAGNOSIS — N39.0 URINARY TRACT INFECTION WITH HEMATURIA, SITE UNSPECIFIED: ICD-10-CM

## 2022-12-27 DIAGNOSIS — R31.9 URINARY TRACT INFECTION WITH HEMATURIA, SITE UNSPECIFIED: ICD-10-CM

## 2022-12-27 LAB
BACTERIA UR QL AUTO: ABNORMAL /HPF
BILIRUB UR QL STRIP: NEGATIVE
CLARITY UR: CLEAR
COLOR UR: ABNORMAL
GLUCOSE UR STRIP-MCNC: ABNORMAL MG/DL
HGB UR QL STRIP.AUTO: ABNORMAL
KETONES UR STRIP-MCNC: NEGATIVE MG/DL
LEUKOCYTE ESTERASE UR QL STRIP: ABNORMAL
NITRITE UR QL STRIP: NEGATIVE
NON-SQ EPI CELLS URNS QL MICRO: ABNORMAL /HPF
PH UR STRIP.AUTO: 6 [PH]
PROT UR STRIP-MCNC: ABNORMAL MG/DL
RBC #/AREA URNS AUTO: ABNORMAL /HPF
SP GR UR STRIP.AUTO: 1.03 (ref 1–1.03)
UROBILINOGEN UR STRIP-ACNC: <2 MG/DL
WBC #/AREA URNS AUTO: ABNORMAL /HPF

## 2022-12-27 NOTE — TELEPHONE ENCOUNTER
Called Daja stating that he is scheduled to discuss u/s on 1/9 @ 7:30 in the [place] office - He is to call back to confirm or deny appointment

## 2022-12-28 ENCOUNTER — TELEPHONE (OUTPATIENT)
Dept: SLEEP CENTER | Facility: CLINIC | Age: 57
End: 2022-12-28

## 2022-12-28 LAB — BACTERIA UR CULT: NORMAL

## 2022-12-28 NOTE — TELEPHONE ENCOUNTER
Patient left message on the nurse line requesting new Rx for CPAP to provide to [facility]  Patient had office visit 12/7/2022  Dr Sara Arango, please provide Rx for CPAP if agreeable  Thank you

## 2022-12-29 ENCOUNTER — HOSPITAL ENCOUNTER (OUTPATIENT)
Dept: ULTRASOUND IMAGING | Facility: HOSPITAL | Age: 57
Discharge: HOME | End: 2022-12-29

## 2022-12-29 ENCOUNTER — VBI (OUTPATIENT)
Dept: ADMINISTRATIVE | Facility: OTHER | Age: 57
End: 2022-12-29

## 2022-12-29 DIAGNOSIS — N50.819 PAIN IN TESTICLE, UNSPECIFIED LATERALITY: ICD-10-CM

## 2023-01-01 ENCOUNTER — TELEPHONE (OUTPATIENT)
Age: 58
End: 2023-01-01

## 2023-01-01 ENCOUNTER — HOSPITAL ENCOUNTER (EMERGENCY)
Facility: HOSPITAL | Age: 58
End: 2023-11-24
Attending: EMERGENCY MEDICINE
Payer: COMMERCIAL

## 2023-01-01 VITALS — SYSTOLIC BLOOD PRESSURE: 147 MMHG | DIASTOLIC BLOOD PRESSURE: 69 MMHG | OXYGEN SATURATION: 62 %

## 2023-01-01 DIAGNOSIS — I46.9 CARDIAC ARREST (HCC): Primary | ICD-10-CM

## 2023-01-01 LAB
CA-I BLD-SCNC: 1.04 MMOL/L (ref 1.12–1.32)
GLUCOSE SERPL-MCNC: 326 MG/DL (ref 65–140)
HCT VFR BLD CALC: 47 % (ref 36.5–49.3)
HGB BLDA-MCNC: 16 G/DL (ref 12–17)
PCO2 BLD: >103 MM HG (ref 42–50)
PH BLD: 6.82 [PH] (ref 7.3–7.4)
PO2 BLD: 29 MM HG (ref 35–45)
POTASSIUM BLD-SCNC: 4.8 MMOL/L (ref 3.5–5.3)
SODIUM BLD-SCNC: 142 MMOL/L (ref 136–145)
SPECIMEN SOURCE: ABNORMAL

## 2023-01-01 PROCEDURE — 99291 CRITICAL CARE FIRST HOUR: CPT | Performed by: EMERGENCY MEDICINE

## 2023-01-01 PROCEDURE — 84295 ASSAY OF SERUM SODIUM: CPT

## 2023-01-01 PROCEDURE — 82803 BLOOD GASES ANY COMBINATION: CPT

## 2023-01-01 PROCEDURE — 84132 ASSAY OF SERUM POTASSIUM: CPT

## 2023-01-01 PROCEDURE — 85014 HEMATOCRIT: CPT

## 2023-01-01 PROCEDURE — 82330 ASSAY OF CALCIUM: CPT

## 2023-01-01 PROCEDURE — 92950 HEART/LUNG RESUSCITATION CPR: CPT | Performed by: EMERGENCY MEDICINE

## 2023-01-01 PROCEDURE — 96375 TX/PRO/DX INJ NEW DRUG ADDON: CPT

## 2023-01-01 PROCEDURE — 99291 CRITICAL CARE FIRST HOUR: CPT

## 2023-01-01 PROCEDURE — 82947 ASSAY GLUCOSE BLOOD QUANT: CPT

## 2023-01-01 PROCEDURE — 92950 HEART/LUNG RESUSCITATION CPR: CPT

## 2023-01-01 PROCEDURE — 96374 THER/PROPH/DIAG INJ IV PUSH: CPT

## 2023-01-01 RX ORDER — AMIODARONE HYDROCHLORIDE 50 MG/ML
INJECTION, SOLUTION INTRAVENOUS CODE/TRAUMA/SEDATION MEDICATION
Status: COMPLETED | OUTPATIENT
Start: 2023-01-01 | End: 2023-01-01

## 2023-01-01 RX ORDER — SODIUM BICARBONATE 84 MG/ML
INJECTION, SOLUTION INTRAVENOUS CODE/TRAUMA/SEDATION MEDICATION
Status: COMPLETED | OUTPATIENT
Start: 2023-01-01 | End: 2023-01-01

## 2023-01-01 RX ORDER — EPINEPHRINE 0.1 MG/ML
INJECTION INTRAVENOUS
Status: COMPLETED
Start: 2023-01-01 | End: 2023-01-01

## 2023-01-01 RX ORDER — EPINEPHRINE 0.1 MG/ML
1 SYRINGE (ML) INJECTION ONCE
Status: COMPLETED | OUTPATIENT
Start: 2023-01-01 | End: 2023-01-01

## 2023-01-01 RX ORDER — CALCIUM CHLORIDE 100 MG/ML
SYRINGE (ML) INTRAVENOUS CODE/TRAUMA/SEDATION MEDICATION
Status: COMPLETED | OUTPATIENT
Start: 2023-01-01 | End: 2023-01-01

## 2023-01-01 RX ORDER — EPINEPHRINE 0.1 MG/ML
INJECTION INTRAVENOUS CODE/TRAUMA/SEDATION MEDICATION
Status: COMPLETED | OUTPATIENT
Start: 2023-01-01 | End: 2023-01-01

## 2023-01-01 RX ADMIN — SODIUM BICARBONATE 50 MEQ: 84 INJECTION, SOLUTION INTRAVENOUS at 02:06

## 2023-01-01 RX ADMIN — CALCIUM CHLORIDE 1 G: 100 INJECTION PARENTERAL at 02:15

## 2023-01-01 RX ADMIN — AMIODARONE HYDROCHLORIDE 150 MG: 50 INJECTION, SOLUTION INTRAVENOUS at 02:10

## 2023-01-01 RX ADMIN — EPINEPHRINE 1 MG: 0.1 INJECTION INTRAVENOUS at 02:14

## 2023-01-01 RX ADMIN — EPINEPHRINE 1 MG: 0.1 INJECTION INTRAVENOUS at 02:03

## 2023-01-01 RX ADMIN — EPINEPHRINE 1 MG: 0.1 INJECTION INTRAVENOUS at 02:11

## 2023-01-03 NOTE — TELEPHONE ENCOUNTER
Compliance data unable to be obtained remotely  Per [facility] liaison, last compliance is from 2014 which shows a ramp at 8cm H2O and pressure was 15cm H2O  Call placed to paced  Left message for patient to call nursing staff back to provide mask type and pressure setting

## 2023-01-03 NOTE — TELEPHONE ENCOUNTER
Request for mask type and current settings sent to [facility] from [facility]

## 2023-01-05 DIAGNOSIS — G47.33 OBSTRUCTIVE SLEEP APNEA (ADULT) (PEDIATRIC): Primary | ICD-10-CM

## 2023-01-05 NOTE — TELEPHONE ENCOUNTER
Rx for CPAP and clinicals sent to "[facility]" via [employer]  Patient notified via [employer] that Rx was provided and sent to [address] [place] [address] via [address] [place] [address]  Patient to call the office to schedule DME setup and compliance follow-up appointments

## 2023-01-05 NOTE — PROGRESS NOTES
1/9/2023    Delton Sandifer Ferencin  1965  [id number]      Assessment  -Orchialgia  -Prostate cancer screening  -Erectile dysfunction     Discussion/Plan  Luna Trevizo is a 56 y o  male being managed by our office    1  Orchialgia- reviewed the results of his recent scrotal ultrasound which was unremarkable for any acute findings  Trace bilateral hydrocele, mild right varicocele, and 2mm right epididymal cyst noted  Reviewed supportive measures  2  Gross hematuria-urine dip in the office today shows presence of blood, we will send for urinalysis with microscopic, culture, and cytology  PVR in the office today is 41 mL  Given his significant chewing tobacco use would recommend proceeding with hematuria work-up including CT renal protocol and flexible cystoscopy  Informed consent was provided  He is amenable with plan  Hematuria may be secondary to BPH and anticoagulation  3  Prostate cancer screening- patient did not obtain PSA prior to today's visit  Last PSA from 8/25/2021 was 0 5  Order remains pending  Patient will obtain in the next 1-2 weeks, and we will call with his results  No abnormal findings noted on digital rectal examination today  Follow-up with MD for cystoscopy and review of CT and PSA to complete gross hematuria work-up  He was advised to call sooner with any questions or issues     -All questions answered, patient agrees with plan      History of Present Illness  56 y o  male with a history of BPH and ED presents today for follow up  Patient last seen in the office in October 2021 for consultation of urinary frequency  He was prescribed tamsulosin 0 4 mg HS at that time  Patient states he discontinued medication as it caused side effect of dizziness  He denies any lower urinary tract symptoms  Last PSA from August 2021 was 0 5  He continues to use sildenafil as needed prior to sexual activity        More recently, patient reports episode of painless gross hematuria on several occasions around [place] after lifting a large generator  He describes as bright red in color and dissipated over the course of a few days  Patient denies any additional lower urinary tract symptoms, dysuria, flank pain, or symptoms of acute infection  He recently completed a scrotal ultrasound which showed no evidence of acute findings  He reports a 35-year history of chewing tobacco and quit 6 years ago secondary to placement of a cardiac stent  Patient on daily Eliquis  He previously used 2 cans daily  Patient denies any strong family history of prostate or urologic malignancy  He states he underwent cystoscopy a few years ago by Dr Sary Gann  Review of Systems  Review of Systems   Constitutional: Negative  HENT: Negative  Respiratory: Negative  Cardiovascular: Negative  Gastrointestinal: Negative  Genitourinary: Positive for hematuria  Negative for decreased urine volume, difficulty urinating, dysuria, flank pain, frequency, scrotal swelling, testicular pain and urgency  Musculoskeletal: Negative  Skin: Negative  Neurological: Negative  Psychiatric/Behavioral: Negative            Past Medical History  Past Medical History:   Diagnosis Date   • Abnormal EKG 12/8/2017   • ACE-inhibitor cough 10/13/2020   • Breast mass in male 2/22/2021   • CAD (coronary artery disease)    • Diabetes (HCC)    • Dizziness    • Erectile dysfunction    • GERD (gastroesophageal reflux disease)    • Hyperlipidemia    • Hypertension    • Shortness of breath        Past Social History  Past Surgical History:   Procedure Laterality Date   • CARDIAC CATHETERIZATION     • ELECTRICAL CARDIOVERSION  2/4/2019            Past Family History  Family History   Problem Relation Age of Onset   • Coronary artery disease Family    • Diabetes Family    • No Known Problems Father        Past Social history  Social History     Socioeconomic History   • Marital status: /Civil Union     Spouse name: Not on file   • Number of children: Not on file   • Years of education: Not on file   • Highest education level: Not on file   Occupational History   • Not on file   Tobacco Use   • Smoking status: Never   • Smokeless tobacco: Former     Quit date: 9/7/2014   Vaping Use   • Vaping Use: Never used   Substance and Sexual Activity   • Alcohol use: No   • Drug use: No   • Sexual activity: Not on file   Other Topics Concern   • Not on file   Social History Narrative   • Not on file     Social Determinants of Health     Financial Resource Strain: Not on file   Food Insecurity: Not on file   Transportation Needs: Not on file   Physical Activity: Not on file   Stress: Not on file   Social Connections: Not on file   Intimate Partner Violence: Not on file   Housing Stability: Not on file       Current Medications  Current Outpatient Medications   Medication Sig Dispense Refill   • dofetilide (TIKOSYN) 125 mcg capsule Take 1 capsule (125 mcg total) by mouth 2 (two) times a day One capsule twice daily 180 capsule 3   • apixaban (ELIQUIS) 5 mg Take 1 tablet (5 mg total) by mouth 2 (two) times a day 180 tablet 3   • aspirin 81 mg chewable tablet Chew 1 tablet (81 mg total) daily (Patient not taking: Reported on 12/7/2022) 100 tablet 1   • co-enzyme Q-10 30 MG capsule Take 30 mg by mouth daily   (Patient not taking: Reported on 12/7/2022)     • ergocalciferol (ERGOCALCIFEROL) 1 25 MG (68082 UT) capsule Take 50,000 Units by mouth     • ezetimibe (ZETIA) 10 mg tablet Take 10 mg by mouth daily     • furosemide (LASIX) 20 mg tablet Take 1 tablet (20 mg total) by mouth daily 90 tablet 3   • ivabradine HCl (Corlanor) 5 MG tablet Take 1 tablet (5 mg total) by mouth 2 (two) times a day 60 tablet 11   • metoprolol succinate (TOPROL-XL) 100 mg 24 hr tablet Take 1 5 tablets (150 mg total) by mouth 2 (two) times a day 270 tablet 1   • ONETOUCH DELICA LANCETS 89G MISC CHECK BID  1   • ONETOUCH VERIO test strip CHECK TWICE DAILY  3   • OZEMPIC 1 MG/DOSE SOPN INJECT 1 MG SQ Q WEEK  4   • rosuvastatin (CRESTOR) 20 MG tablet Take 1 tablet (20 mg total) by mouth daily 90 tablet 1   • sildenafil (VIAGRA) 50 MG tablet Take 1 tablet (50 mg total) by mouth daily as needed for erectile dysfunction 10 tablet 6   • SYNJARDY XR 12 5-1000 MG TB24 TK 1 T PO BID  2   • tamsulosin (FLOMAX) 0 4 mg Take 1 capsule (0 4 mg total) by mouth daily with dinner 30 capsule 3   • terazosin (HYTRIN) 1 mg capsule Take 1 mg by mouth (Patient not taking: Reported on 12/7/2022)     • valsartan (DIOVAN) 160 mg tablet Take 1 tablet (160 mg total) by mouth daily (Patient taking differently: Take 320 mg by mouth daily) 180 tablet 3     No current facility-administered medications for this visit  Allergies  Allergies   Allergen Reactions   • Dye [Iodinated Contrast Media] Hives and GI Intolerance     Definity - name of contrast     • Aceinhibitors [Ace Inhibitors] Cough       Past Medical History, Social History, Family History, medications and allergies were reviewed  Vitals  There were no vitals filed for this visit  Physical Exam  Physical Exam  Constitutional:       Appearance: Normal appearance  He is well-developed  HENT:      Head: Normocephalic  Eyes:      Pupils: Pupils are equal, round, and reactive to light  Pulmonary:      Effort: Pulmonary effort is normal    Abdominal:      Palpations: Abdomen is soft  Tenderness: There is no right CVA tenderness or left CVA tenderness  Genitourinary:     Prostate: Normal       Rectum: Normal       Comments: Prostate 50 g, smooth, nontender, no nodules  Musculoskeletal:         General: Normal range of motion  Cervical back: Normal range of motion  Skin:     General: Skin is warm and dry  Neurological:      General: No focal deficit present  Mental Status: He is alert and oriented to person, place, and time  Psychiatric:         Mood and Affect: Mood normal          Behavior: Behavior normal          Thought Content:  Thought content normal          Judgment: Judgment normal          Results    I have personally reviewed all pertinent lab results and reviewed with patient  Lab Results   Component Value Date    PSA 0 5 08/25/2021    PSA 1 0 09/23/2019     Lab Results   Component Value Date    CALCIUM 8 7 01/13/2022    K 3 9 01/13/2022    CO2 26 01/13/2022     01/13/2022    BUN 22 01/13/2022    CREATININE 0 86 01/13/2022     Lab Results   Component Value Date    WBC 9 0 01/13/2022    HGB 16 1 01/13/2022    HCT 48 7 01/13/2022    MCV 82 1 01/13/2022     01/13/2022     No results found for this or any previous visit (from the past 1 hour(s))

## 2023-01-06 LAB
DME PARACHUTE DELIVERY DATE REQUESTED: NORMAL
DME PARACHUTE DELIVERY NOTE: NORMAL
DME PARACHUTE ITEM DESCRIPTION: NORMAL
DME PARACHUTE ORDER STATUS: NORMAL
DME PARACHUTE SUPPLIER NAME: NORMAL
DME PARACHUTE SUPPLIER PHONE: NORMAL

## 2023-01-09 ENCOUNTER — OFFICE VISIT (OUTPATIENT)
Dept: UROLOGY | Facility: AMBULATORY SURGERY CENTER | Age: 58
End: 2023-01-09

## 2023-01-09 VITALS
HEART RATE: 83 BPM | HEIGHT: 67 IN | DIASTOLIC BLOOD PRESSURE: 88 MMHG | BODY MASS INDEX: 37.04 KG/M2 | WEIGHT: 236 LBS | OXYGEN SATURATION: 93 % | SYSTOLIC BLOOD PRESSURE: 118 MMHG

## 2023-01-09 DIAGNOSIS — Z12.5 SCREENING FOR PROSTATE CANCER: ICD-10-CM

## 2023-01-09 DIAGNOSIS — N52.9 ERECTILE DYSFUNCTION, UNSPECIFIED ERECTILE DYSFUNCTION TYPE: ICD-10-CM

## 2023-01-09 DIAGNOSIS — N40.1 BENIGN PROSTATIC HYPERPLASIA WITH URINARY FREQUENCY: ICD-10-CM

## 2023-01-09 DIAGNOSIS — R35.0 BENIGN PROSTATIC HYPERPLASIA WITH URINARY FREQUENCY: ICD-10-CM

## 2023-01-09 DIAGNOSIS — N50.819 PAIN IN TESTICLE, UNSPECIFIED LATERALITY: Primary | ICD-10-CM

## 2023-01-09 DIAGNOSIS — R31.0 GROSS HEMATURIA: ICD-10-CM

## 2023-01-09 LAB
BACTERIA UR QL AUTO: ABNORMAL /HPF
BILIRUB UR QL STRIP: NEGATIVE
CLARITY UR: CLEAR
COLOR UR: ABNORMAL
DME PARACHUTE DELIVERY DATE REQUESTED: NORMAL
DME PARACHUTE DELIVERY NOTE: NORMAL
DME PARACHUTE ITEM DESCRIPTION: NORMAL
DME PARACHUTE ORDER STATUS: NORMAL
DME PARACHUTE SUPPLIER NAME: NORMAL
DME PARACHUTE SUPPLIER PHONE: NORMAL
GLUCOSE UR STRIP-MCNC: ABNORMAL MG/DL
HGB UR QL STRIP.AUTO: ABNORMAL
HYALINE CASTS #/AREA URNS LPF: ABNORMAL /LPF
KETONES UR STRIP-MCNC: NEGATIVE MG/DL
LEUKOCYTE ESTERASE UR QL STRIP: ABNORMAL
MUCOUS THREADS UR QL AUTO: ABNORMAL
NITRITE UR QL STRIP: NEGATIVE
NON-SQ EPI CELLS URNS QL MICRO: ABNORMAL /HPF
PH UR STRIP.AUTO: 6 [PH]
POST-VOID RESIDUAL VOLUME, ML POC: 41 ML
PROT UR STRIP-MCNC: ABNORMAL MG/DL
RBC #/AREA URNS AUTO: ABNORMAL /HPF
SL AMB  POCT GLUCOSE, UA: ABNORMAL
SL AMB LEUKOCYTE ESTERASE,UA: ABNORMAL
SL AMB POCT BILIRUBIN,UA: ABNORMAL
SL AMB POCT BLOOD,UA: ABNORMAL
SL AMB POCT CLARITY,UA: CLEAR
SL AMB POCT COLOR,UA: YELLOW
SL AMB POCT KETONES,UA: ABNORMAL
SL AMB POCT NITRITE,UA: ABNORMAL
SL AMB POCT PH,UA: 5
SL AMB POCT SPECIFIC GRAVITY,UA: 1.01
SL AMB POCT URINE PROTEIN: + 30
SL AMB POCT UROBILINOGEN: 0.2
SP GR UR STRIP.AUTO: 1.03 (ref 1–1.03)
UROBILINOGEN UR STRIP-ACNC: <2 MG/DL
WBC #/AREA URNS AUTO: ABNORMAL /HPF

## 2023-01-09 NOTE — PATIENT INSTRUCTIONS
Cystoscopy   AMBULATORY CARE:   A cystoscopy  is a procedure to look inside your urethra and bladder using a cystoscope  A cystoscope is a small tube with a light and magnifying camera on the end  The procedure is used to diagnose and treat conditions of the bladder, urethra, or prostate  Your healthcare provider may also do a ureteroscopy during a cystoscopy  A ureteroscopy is a procedure to look inside your ureters and kidneys  Prepare for your cystoscopy: Your healthcare provider will talk to you about how to prepare for your procedure  He or she will tell you what medicines to take and not to take on the day of your procedure  You may need to stop taking medicines such as anticoagulants, aspirin, and ibuprofen several days before your procedure  Your provider may tell you stop eating after midnight the night before your procedure  You may be asked to drink a large amount of liquids before your procedure  Arrange for a ride home after your procedure  You will not be allowed to drive yourself home  During your cystoscopy: You may be given general anesthesia to keep you asleep and pain free during your procedure  Your healthcare provider may instead use local anesthesia  It is put into your urethra and bladder  You will not feel pain, but you may be able to feel some pressure during your procedure  With local anesthesia, you may feel burning or need to urinate when the cystoscope is put in and removed  If you are female, you will be placed on your back and your feet may be placed in stirrups  If you are male, you will be placed on your back or in a sitting position  The cystoscope will be placed through your urethra and into your bladder  The urologist will look at the walls of your urethra as the scope goes through to your bladder  Your bladder will be filled with liquid so your urologist can see the inside of your bladder more clearly   Tools may be inserted through the cystoscope to treat any problems in your urethra or bladder  Your provider may also take a sample of tissue and send it to a lab for tests  After your cystoscopy:  After you are fully awake, you will go home  You may see small amounts of blood in your urine  This is normal  It is also normal to have an increased need to urinate  You may also have burning or mild discomfort in your bladder or kidney area when you urinate  If you had general anesthesia, it may take at least 24 hours before you feel like your usual self  Risks of a cystoscopy: You may bleed more than expected or develop an infection  Your urethra, bladder, or ureters may get damaged during your procedure  You may have abdominal pain  Swelling caused by the cystoscopy may cause a blockage or slow urine flow  Seek care immediately if:   Your urine turns from pink to red, or you have clots in your urine  You cannot urinate and your bladder feels full  You have severe pain  Contact your healthcare provider or urologist if:   Your pain or burning during urination becomes worse or lasts longer than 1 day  Your urine stays pink for longer than 1 day  You have a fever and chills  You urinate less than usual, or still feel like you have to urinate after you use the bathroom  You have questions or concerns about your condition or care  Medicines: You may  be given any of the following:  Antibiotics  help treat or prevent a bacterial infection  Acetaminophen  decreases pain and fever  It is available without a doctor's order  Ask how much to take and how often to take it  Follow directions  Read the labels of all other medicines you are using to see if they also contain acetaminophen, or ask your doctor or pharmacist  Acetaminophen can cause liver damage if not taken correctly  Do not use more than 4 grams (4,000 milligrams) total of acetaminophen in one day  Take your medicine as directed    Contact your healthcare provider if you think your medicine is not helping or if you have side effects  Tell him or her if you are allergic to any medicine  Keep a list of the medicines, vitamins, and herbs you take  Include the amounts, and when and why you take them  Bring the list or the pill bottles to follow-up visits  Carry your medicine list with you in case of an emergency  Self-care:   Drink liquids as directed  Your healthcare provider may recommend that you drink 6 to 8 eight-ounce cups of water every day for 2 days after your procedure  Apply a warm, damp washcloth over your urethral opening  This may help to relieve discomfort  Ask when you can return to regular daily activities  Your healthcare provider may recommend that you rest after your procedure  Do not have sex  until your healthcare provider tells you it is okay  Sex may increase your risk for a urinary tract infection  Follow up with your healthcare provider as directed:  Write down your questions so you remember to ask them during your visits  © Copyright [employer] 2022 Information is for End User's use only and may not be sold, redistributed or otherwise used for commercial purposes  All illustrations and images included in CareNotes® are the copyrighted property of [employer]  or Antonieta Ugalde   The above information is an  only  It is not intended as medical advice for individual conditions or treatments  Talk to your doctor, nurse or pharmacist before following any medical regimen to see if it is safe and effective for you

## 2023-01-10 LAB — BACTERIA UR CULT: ABNORMAL

## 2023-01-11 ENCOUNTER — TELEPHONE (OUTPATIENT)
Dept: UROLOGY | Facility: CLINIC | Age: 58
End: 2023-01-11

## 2023-01-11 DIAGNOSIS — R31.0 GROSS HEMATURIA: Primary | ICD-10-CM

## 2023-01-11 NOTE — TELEPHONE ENCOUNTER
----- Message from [id number] No Lilly sent at 1/10/2023  2:17 PM EST -----  Patient seen in the office today  Urine to be sent for UA, culture, and cytology, but cytology not sent  Please call lab to see if order can be added

## 2023-01-11 NOTE — TELEPHONE ENCOUNTER
Contacted lab, who unfortunately no longer has patients urine sample  Order placed in patients chart for urine cytology per Namrata Benton  Called and left VM for patient that urine cytology is needed and asked that he leave a sample at [address] and we will contact him with results  Office number left in message to call back if he has any questions

## 2023-01-15 PROBLEM — Z12.5 PROSTATE CANCER SCREENING: Status: RESOLVED | Noted: 2022-11-16 | Resolved: 2023-01-15

## 2023-01-18 DIAGNOSIS — I48.91 NEW ONSET ATRIAL FIBRILLATION (HCC): ICD-10-CM

## 2023-01-18 DIAGNOSIS — I25.119 CORONARY ARTERY DISEASE INVOLVING NATIVE CORONARY ARTERY OF NATIVE HEART WITH ANGINA PECTORIS (HCC): ICD-10-CM

## 2023-02-02 ENCOUNTER — TELEPHONE (OUTPATIENT)
Dept: UROLOGY | Facility: AMBULATORY SURGERY CENTER | Age: 58
End: 2023-02-02

## 2023-02-02 NOTE — TELEPHONE ENCOUNTER
Hello,    Patient is going to be seen at [facility]) Urology (NPI: [id number]) on 2/23, and an insurance referral will be needed for the visit  The diagnoses needed for the visit are R31 0, N40 1, R35 0, N50 819  The procedure codes needed are 83425, [id number]  That is everything, thank your for your time and help

## 2023-02-07 ENCOUNTER — HOSPITAL ENCOUNTER (OUTPATIENT)
Dept: CT IMAGING | Facility: HOSPITAL | Age: 58
Discharge: HOME | End: 2023-02-07

## 2023-02-07 DIAGNOSIS — R31.0 GROSS HEMATURIA: ICD-10-CM

## 2023-02-07 RX ORDER — IODIXANOL 320 MG/ML
100 INJECTION, SOLUTION INTRAVASCULAR
Status: COMPLETED | OUTPATIENT
Start: 2023-02-07 | End: 2023-02-07

## 2023-02-07 RX ADMIN — IODIXANOL 100 ML: 320 INJECTION, SOLUTION INTRAVASCULAR at 09:29

## 2023-02-09 ENCOUNTER — TELEPHONE (OUTPATIENT)
Dept: UROLOGY | Facility: AMBULATORY SURGERY CENTER | Age: 58
End: 2023-02-09

## 2023-02-09 DIAGNOSIS — R39.9 UTI SYMPTOMS: Primary | ICD-10-CM

## 2023-02-09 DIAGNOSIS — N20.0 NEPHROLITHIASIS: ICD-10-CM

## 2023-02-09 NOTE — TELEPHONE ENCOUNTER
Spoke with patient report fruit punch colored urine since last night, reports ome burning/discomfort after urination  Denies frequency, urgency, nausea, vomiting  Pt did have CT scan done on 02/07/23, pending results  Pt also scheduled for cysto in March  Pt does take blood thinners but states first time the hematuria didn't go away

## 2023-02-09 NOTE — TELEPHONE ENCOUNTER
CT scan remains pending, will call as soon as it is finalized  Please see if cystoscopy can be scheduled for earlier appointment given persistent gross hematuria  Recommend patient contact ordering prescriber for Eliquis to see if this has remained clear for at least 24 to 48 hours  Encourage patient to increase water intake and review ER precautions

## 2023-02-09 NOTE — TELEPHONE ENCOUNTER
Patient under the care of Steph Mcgraw in [place]    Patient calling for recommendations  Patient has been having blood in his urine since last night  He stated it is every time he urinates and is the color of fruit punch  He will also have a discomfort after he urinates that does not last long      Transferred to triage nurse Temo Skinner

## 2023-02-10 DIAGNOSIS — I48.91 NEW ONSET ATRIAL FIBRILLATION (HCC): ICD-10-CM

## 2023-02-10 DIAGNOSIS — I50.22 CHF (CONGESTIVE HEART FAILURE), NYHA CLASS III, CHRONIC, SYSTOLIC (HCC): ICD-10-CM

## 2023-02-10 DIAGNOSIS — R00.0 SINUS TACHYCARDIA: ICD-10-CM

## 2023-02-10 DIAGNOSIS — I25.119 CORONARY ARTERY DISEASE INVOLVING NATIVE CORONARY ARTERY OF NATIVE HEART WITH ANGINA PECTORIS (HCC): ICD-10-CM

## 2023-02-10 DIAGNOSIS — I10 ESSENTIAL HYPERTENSION: ICD-10-CM

## 2023-02-10 DIAGNOSIS — I48.19 PERSISTENT ATRIAL FIBRILLATION (HCC): ICD-10-CM

## 2023-02-10 RX ORDER — METOPROLOL SUCCINATE 100 MG/1
200 TABLET, EXTENDED RELEASE ORAL 2 TIMES DAILY
Qty: 120 TABLET | Refills: 3 | Status: SHIPPED | OUTPATIENT
Start: 2023-02-10

## 2023-02-10 RX ORDER — DOFETILIDE 0.12 MG/1
125 CAPSULE ORAL 2 TIMES DAILY
Qty: 180 CAPSULE | Refills: 3 | Status: SHIPPED | OUTPATIENT
Start: 2023-02-10

## 2023-02-10 RX ORDER — IVABRADINE 5 MG/1
5 TABLET, FILM COATED ORAL 2 TIMES DAILY
Qty: 60 TABLET | Refills: 11 | Status: SHIPPED | OUTPATIENT
Start: 2023-02-10

## 2023-02-10 RX ORDER — VALSARTAN 320 MG/1
320 TABLET ORAL DAILY
Qty: 30 TABLET | Refills: 6 | Status: SHIPPED | OUTPATIENT
Start: 2023-02-10

## 2023-02-10 NOTE — TELEPHONE ENCOUNTER
Spoke with patient reports having some soreness after urination, does have blood in urine, reports looks like  punch  Orders placed for UA/UC  Advised increase hydration and ED precautions reviewed  Pt very worried so appt scheduled for Monday  Will contact him in the morning to see how his symptoms are   Did advise Monday maybe too soon since he has cysto appt on 2/23/23

## 2023-02-10 NOTE — TELEPHONE ENCOUNTER
KUN for luz that we had an  opening today @ 56 - he should call back if available - did notileandrae in chart he is a  and would probably not be able tot take off

## 2023-02-10 NOTE — TELEPHONE ENCOUNTER
Patient called stating he still having a lot of blood in his urine  He did not see the message for this morning  He is a  and he is working right now but He wants to know how to proceed       Patient can be reached at [phone]

## 2023-02-10 NOTE — TELEPHONE ENCOUNTER
Pt called back and wants to know if he can have an emergency appointment Monday     Pt can be reached at [phone]

## 2023-02-13 RX ORDER — TAMSULOSIN HYDROCHLORIDE 0.4 MG/1
0.4 CAPSULE ORAL
Qty: 30 CAPSULE | Refills: 1 | Status: SHIPPED | OUTPATIENT
Start: 2023-02-13 | End: 2023-02-14

## 2023-02-13 NOTE — TELEPHONE ENCOUNTER
Please call patient to follow up on symptoms  Results of CT pending  Please see if cystoscopy can be scheduled for earlier date given worsening gross hematuria

## 2023-02-13 NOTE — TELEPHONE ENCOUNTER
Called and spoke with patient  Advised on AP's note  He is okay with waiting until appt next week  Looked for sooner cysto appt but none available  Advised to stay well hydrated

## 2023-02-14 ENCOUNTER — TELEPHONE (OUTPATIENT)
Dept: CARDIOLOGY CLINIC | Facility: CLINIC | Age: 58
End: 2023-02-14

## 2023-02-14 DIAGNOSIS — N20.0 NEPHROLITHIASIS: ICD-10-CM

## 2023-02-14 RX ORDER — TAMSULOSIN HYDROCHLORIDE 0.4 MG/1
CAPSULE ORAL
Qty: 90 CAPSULE | Refills: 3 | Status: SHIPPED | OUTPATIENT
Start: 2023-02-14

## 2023-02-14 NOTE — TELEPHONE ENCOUNTER
Received results of CT scan which notes a 5mm right proximal ureteral calculus with hydronephrosis  Additional bilateral nonobstructing left renal calculi seen  No bladder abnormality or mass noted  Obstructing stone likely causing his gross hematuria  Recommend starting tamsulosin for medical expulsive therapy, increasing water intake, and straining all urine  He can keep cysto appointment as scheduled given his tobacco history

## 2023-02-14 NOTE — TELEPHONE ENCOUNTER
Called luz back and Received results of CT scan which notes a 5mm right proximal ureteral calculus with hydronephrosis  Additional bilateral nonobstructing left renal calculi seen  No bladder abnormality or mass noted  Obstructing stone likely causing his gross hematuria  Recommend starting tamsulosin for medical expulsive therapy, increasing water intake, and straining all urine  He can keep cysto appointment as scheduled given his tobacco history  He understood    He states he pain has subsided

## 2023-02-14 NOTE — TELEPHONE ENCOUNTER
Dominik Munoz,  Patient needs Corlanor authorized again as is not covered  You got it auth'd last year, can you please try again  He is given it for heart failure from what I can see  If I can help in any way, please let me know  Your note from last year was 1/28/22

## 2023-02-15 NOTE — TELEPHONE ENCOUNTER
Corlanor 5 mg BID was submitted and  approved through [employer] this morning      CaseId:[id number];Status:Approved; Review Type:Prior Auth; Coverage Start Date:01/16/2023; Coverage End Date:02/15/2024

## 2023-02-17 ENCOUNTER — TELEPHONE (OUTPATIENT)
Dept: UROLOGY | Facility: AMBULATORY SURGERY CENTER | Age: 58
End: 2023-02-17

## 2023-02-17 NOTE — TELEPHONE ENCOUNTER
Called patient to let him know if he is able to get his PSA completed prior to his appt  Pt did not answer left detailed message explaining everything

## 2023-02-22 LAB — PSA SERPL-MCNC: 0.5 NG/ML

## 2023-02-23 ENCOUNTER — TELEPHONE (OUTPATIENT)
Dept: FAMILY MEDICINE CLINIC | Facility: CLINIC | Age: 58
End: 2023-02-23

## 2023-02-23 NOTE — TELEPHONE ENCOUNTER
----- Message from Jolanta Stephenson DO sent at 2/23/2023  8:36 AM EST -----  Please call patient and notify that results of PSA testing is normal

## 2023-03-13 ENCOUNTER — TELEPHONE (OUTPATIENT)
Dept: SLEEP CENTER | Facility: CLINIC | Age: 58
End: 2023-03-13

## 2023-03-13 NOTE — TELEPHONE ENCOUNTER
Returned the patient's call  He reports that he thinks the machine pressure on his replacement machine from [facility]  needs to be adjusted  Advised compliance will be obtained for Dr Luly Garcia to review

## 2023-03-14 DIAGNOSIS — G47.33 OBSTRUCTIVE SLEEP APNEA (ADULT) (PEDIATRIC): Primary | ICD-10-CM

## 2023-03-15 LAB
DME PARACHUTE DELIVERY DATE REQUESTED: NORMAL
DME PARACHUTE ITEM DESCRIPTION: NORMAL
DME PARACHUTE ORDER STATUS: NORMAL
DME PARACHUTE SUPPLIER NAME: NORMAL
DME PARACHUTE SUPPLIER PHONE: NORMAL

## 2023-03-15 NOTE — TELEPHONE ENCOUNTER
Left message for patient advising pressure change ordered of APAP 5-15cm  Order has been sent to [address] and change can take 24-48 hours to occur  Provided nurse line number to call if any questions

## 2023-03-22 DIAGNOSIS — I50.22 CHF (CONGESTIVE HEART FAILURE), NYHA CLASS III, CHRONIC, SYSTOLIC (HCC): ICD-10-CM

## 2023-03-22 RX ORDER — FUROSEMIDE 20 MG/1
TABLET ORAL
Qty: 90 TABLET | Refills: 3 | Status: SHIPPED | OUTPATIENT
Start: 2023-03-22

## 2023-03-26 DIAGNOSIS — I10 ESSENTIAL HYPERTENSION: ICD-10-CM

## 2023-03-27 RX ORDER — VALSARTAN 320 MG/1
TABLET ORAL
Qty: 90 TABLET | Refills: 3 | Status: SHIPPED | OUTPATIENT
Start: 2023-03-27

## 2023-04-16 NOTE — LETTER
[address] [place] [address] 5  [address] [address]  Dept: [phone]    January 25, 2019     Patient: Jihan Jeter   YOB: 1965   Date of Visit: 1/22/2019       To Whom it May Concern:    Alyssa Adler is under my professional care  He was seen in the hospital from 1/22/2019   to 01/25/19 and underwent a procedure  He may return to work on Wednesday 2/6/2018 without limitations or restrictions  If you have any questions or concerns, please contact our office - [phone]           Sincerely,          Esequiel Bhatt PA-C Standing/Walking/Toileting

## 2023-05-16 RX ORDER — TERAZOSIN 1 MG/1
1 CAPSULE ORAL
COMMUNITY
Start: 2023-03-27 | End: 2024-03-26

## 2023-05-17 ENCOUNTER — OFFICE VISIT (OUTPATIENT)
Dept: FAMILY MEDICINE CLINIC | Facility: CLINIC | Age: 58
End: 2023-05-17

## 2023-05-17 VITALS
DIASTOLIC BLOOD PRESSURE: 82 MMHG | HEART RATE: 76 BPM | BODY MASS INDEX: 37.04 KG/M2 | SYSTOLIC BLOOD PRESSURE: 132 MMHG | HEIGHT: 67 IN | OXYGEN SATURATION: 97 % | WEIGHT: 236 LBS | RESPIRATION RATE: 16 BRPM

## 2023-05-17 DIAGNOSIS — I10 ESSENTIAL HYPERTENSION: ICD-10-CM

## 2023-05-17 DIAGNOSIS — E78.2 MIXED HYPERLIPIDEMIA: ICD-10-CM

## 2023-05-17 DIAGNOSIS — I25.10 CORONARY ARTERY DISEASE INVOLVING NATIVE CORONARY ARTERY OF NATIVE HEART WITHOUT ANGINA PECTORIS: ICD-10-CM

## 2023-05-17 DIAGNOSIS — E11.69 TYPE 2 DIABETES MELLITUS WITH OTHER SPECIFIED COMPLICATION, WITHOUT LONG-TERM CURRENT USE OF INSULIN (HCC): ICD-10-CM

## 2023-05-17 DIAGNOSIS — I48.19 PERSISTENT ATRIAL FIBRILLATION (HCC): Primary | ICD-10-CM

## 2023-05-17 NOTE — ASSESSMENT & PLAN NOTE
A1c is 6%  Lab Results   Component Value Date    HGBA1C 6 0 (H) 03/22/2023     Follow-up with endocrinology who is managing    Doing very well

## 2023-05-17 NOTE — PROGRESS NOTES
Subjective:      Patient ID: Michael Andrade is a 56 y o  male  81-year-old male with past medical history of chronic atrial fibrillation, coronary artery disease, hyperlipidemia, hypertension, obesity and diabetes mellitus type 2 presents for 6-month follow-up of chronic conditions  Overall he has been feeling well  Follow-up with cardiology, endocrinology  Hemoglobin A1c at 6%  Has not had diabetic eye examination done this year  Overall feeling well  Has lost weight  He is on Ozempic  Urology did place him on tamsulosin as he had a 5 mm stone at the left proximal ureteral junction and the patient thinks that he did pass that  He did not have overt pain but he did have discomfort and a plank in the toilet  He stopped taking tamsulosin because when he was taking it it did make him feel slightly lightheaded  Patient is on Toprol  mg twice daily  No issues with urination at this time  No evidence of BPH on his CT scan      Past Medical History:   Diagnosis Date   • Abnormal EKG 12/8/2017   • ACE-inhibitor cough 10/13/2020   • Breast mass in male 2/22/2021   • CAD (coronary artery disease)    • Diabetes (HCC)    • Dizziness    • Erectile dysfunction    • GERD (gastroesophageal reflux disease)    • Hyperlipidemia    • Hypertension    • Shortness of breath        Family History   Problem Relation Age of Onset   • Coronary artery disease Family    • Diabetes Family    • No Known Problems Father        Past Surgical History:   Procedure Laterality Date   • CARDIAC CATHETERIZATION     • ELECTRICAL CARDIOVERSION  2/4/2019             reports that he has never smoked  He quit smokeless tobacco use about 8 years ago  He reports that he does not drink alcohol and does not use drugs        Current Outpatient Medications:   •  apixaban (ELIQUIS) 5 mg, Take 1 tablet (5 mg total) by mouth 2 (two) times a day, Disp: 180 tablet, Rfl: 3  •  co-enzyme Q-10 30 MG capsule, Take 30 mg by mouth daily, Disp: , Rfl: •  dofetilide (TIKOSYN) 125 mcg capsule, Take 1 capsule (125 mcg total) by mouth 2 (two) times a day One capsule twice daily, Disp: 180 capsule, Rfl: 3  •  furosemide (LASIX) 20 mg tablet, TAKE 1 TABLET(20 MG) BY MOUTH DAILY, Disp: 90 tablet, Rfl: 3  •  ivabradine HCl (Corlanor) 5 MG tablet, Take 1 tablet (5 mg total) by mouth 2 (two) times a day, Disp: 60 tablet, Rfl: 11  •  metoprolol succinate (TOPROL-XL) 100 mg 24 hr tablet, Take 2 tablets (200 mg total) by mouth 2 (two) times a day, Disp: 120 tablet, Rfl: 3  •  ONETOUCH DELICA LANCETS 37C MISC, CHECK BID, Disp: , Rfl: 1  •  ONETOUCH VERIO test strip, CHECK TWICE DAILY, Disp: , Rfl: 3  •  OZEMPIC 1 MG/DOSE SOPN, INJECT 1 MG SQ Q WEEK, Disp: , Rfl: 4  •  rosuvastatin (CRESTOR) 20 MG tablet, Take 1 tablet (20 mg total) by mouth daily, Disp: 90 tablet, Rfl: 1  •  sildenafil (VIAGRA) 50 MG tablet, Take 1 tablet (50 mg total) by mouth daily as needed for erectile dysfunction, Disp: 10 tablet, Rfl: 6  •  SYNJARDY XR 12 5-1000 MG TB24, TK 1 T PO BID, Disp: , Rfl: 2  •  tamsulosin (FLOMAX) 0 4 mg, TAKE 1 CAPSULE(0 4 MG) BY MOUTH DAILY WITH DINNER, Disp: 90 capsule, Rfl: 3  •  terazosin (HYTRIN) 1 mg capsule, Take 1 mg by mouth, Disp: , Rfl:   •  valsartan (DIOVAN) 320 MG tablet, TAKE 1 TABLET(320 MG) BY MOUTH DAILY, Disp: 90 tablet, Rfl: 3  •  aspirin 81 mg chewable tablet, Chew 1 tablet (81 mg total) daily (Patient not taking: Reported on 12/7/2022), Disp: 100 tablet, Rfl: 1  •  ergocalciferol (ERGOCALCIFEROL) 1 25 MG (93524 UT) capsule, Take 50,000 Units by mouth, Disp: , Rfl:   •  ezetimibe (ZETIA) 10 mg tablet, Take 10 mg by mouth daily, Disp: , Rfl:     The following portions of the patient's history were reviewed and updated as appropriate: allergies, current medications, past family history, past medical history, past social history, past surgical history and problem list     Review of Systems   HENT: Negative  Eyes: Negative  Respiratory: Negative  "  Cardiovascular: Negative  Gastrointestinal: Negative  Endocrine: Negative  Genitourinary: Negative  Erectile dysfunction   Musculoskeletal: Negative  Skin: Negative  Allergic/Immunologic: Negative  Neurological: Negative  Hematological: Negative  Psychiatric/Behavioral: Negative  All other systems reviewed and are negative  Objective:    /82   Pulse 76   Resp 16   Ht 5' 7\" (1 702 m)   Wt 107 kg (236 lb)   SpO2 97%   BMI 36 96 kg/m²      Physical Exam  Vitals and nursing note reviewed  Constitutional:       General: He is not in acute distress  Appearance: Normal appearance  He is well-developed  He is obese  He is not ill-appearing  HENT:      Head: Normocephalic and atraumatic  Right Ear: Tympanic membrane, ear canal and external ear normal       Left Ear: Tympanic membrane, ear canal and external ear normal    Eyes:      Extraocular Movements: Extraocular movements intact  Conjunctiva/sclera: Conjunctivae normal       Pupils: Pupils are equal, round, and reactive to light  Cardiovascular:      Rate and Rhythm: Normal rate and regular rhythm  Pulses: Normal pulses  Dorsalis pedis pulses are 2+ on the right side and 2+ on the left side  Posterior tibial pulses are 2+ on the right side and 2+ on the left side  Heart sounds: Normal heart sounds  No murmur heard  Pulmonary:      Effort: Pulmonary effort is normal       Breath sounds: Normal breath sounds  Abdominal:      General: Abdomen is flat  Bowel sounds are normal       Palpations: Abdomen is soft  Musculoskeletal:         General: Normal range of motion  Cervical back: Normal range of motion and neck supple  Right lower leg: No edema  Left lower leg: No edema  Feet:      Right foot:      Skin integrity: No ulcer, skin breakdown, erythema, warmth, callus or dry skin        Left foot:      Skin integrity: No ulcer, skin breakdown, erythema, " warmth, callus or dry skin  Skin:     General: Skin is warm and dry  Neurological:      General: No focal deficit present  Mental Status: He is alert and oriented to person, place, and time  Psychiatric:         Mood and Affect: Mood normal          Behavior: Behavior normal          Thought Content: Thought content normal          Judgment: Judgment normal            Recent Results (from the past 2016 hour(s))   PAP Pressure Change    Collection Time: 03/15/23  7:27 AM   Result Value Ref Range    Supplier Name [address] [place]     Supplier Phone Number [phone]     Order Status Confirmation In Progress     Delivery Note      Delivery Request Date 03/15/2023     Item Description Pressure Change    HEMOGLOBIN A1C    Collection Time: 03/22/23  6:53 AM   Result Value Ref Range    Hgb A1c 6 0 (H) <5 7 % of total Hgb       Assessment/Plan:    Type 2 diabetes mellitus with other specified complication, without long-term current use of insulin (HCC)  A1c is 6%  Lab Results   Component Value Date    HGBA1C 6 0 (H) 03/22/2023     Follow-up with endocrinology who is managing  Doing very well    Persistent atrial fibrillation ([address] )  Managed by cardiology  Remains on Eliquis and beta-blocker    Essential hypertension  Adequately controlled and managed by cardiology    CAD (coronary artery disease)  Control risk factors including diabetes, hypertension, hyperlipidemia  Managed by cardiology    Mixed hyperlipidemia  Cholesterol remains very well controlled on Crestor and Zetia  Follow-up with cardiology and endocrinology as scheduled    Repeat lipid panel to be done in September     I have spent a total time of 41 minutes on 05/17/23 in caring for this patient including Diagnostic results, Prognosis, Risks and benefits of tx options, Instructions for management, Patient and family education, Importance of tx compliance, Risk factor reductions, Impressions, Counseling / Coordination of care, Documenting in the medical record, Reviewing / ordering tests, medicine, procedures   and Obtaining or reviewing history    Problem List Items Addressed This Visit        Endocrine    Type 2 diabetes mellitus with other specified complication, without long-term current use of insulin ([facility])     A1c is 6%  Lab Results   Component Value Date    HGBA1C 6 0 (H) 03/22/2023     Follow-up with endocrinology who is managing  Doing very well         Relevant Orders    Comprehensive metabolic panel    Hemoglobin A1C       Cardiovascular and Mediastinum    CAD (coronary artery disease)     Control risk factors including diabetes, hypertension, hyperlipidemia  Managed by cardiology         Essential hypertension     Adequately controlled and managed by cardiology         Relevant Medications    terazosin (HYTRIN) 1 mg capsule    Other Relevant Orders    CBC and differential    Persistent atrial fibrillation ([address] ) - Primary     Managed by cardiology  Remains on Eliquis and beta-blocker         Relevant Orders    Comprehensive metabolic panel    TSH, 3rd generation with Free T4 reflex       Other    Mixed hyperlipidemia     Cholesterol remains very well controlled on Crestor and Zetia  Follow-up with cardiology and endocrinology as scheduled    Repeat lipid panel to be done in September         Relevant Orders    Comprehensive metabolic panel    Lipid panel

## 2023-05-17 NOTE — ASSESSMENT & PLAN NOTE
Cholesterol remains very well controlled on Crestor and Zetia  Follow-up with cardiology and endocrinology as scheduled    Repeat lipid panel to be done in September

## 2023-06-23 ENCOUNTER — CLINICAL SUPPORT (OUTPATIENT)
Dept: FAMILY MEDICINE CLINIC | Facility: CLINIC | Age: 58
End: 2023-06-23
Payer: COMMERCIAL

## 2023-06-23 DIAGNOSIS — Z23 ENCOUNTER FOR IMMUNIZATION: Primary | ICD-10-CM

## 2023-06-23 PROCEDURE — 90750 HZV VACC RECOMBINANT IM: CPT

## 2023-06-23 PROCEDURE — 90471 IMMUNIZATION ADMIN: CPT

## 2023-06-28 ENCOUNTER — VBI (OUTPATIENT)
Dept: ADMINISTRATIVE | Facility: OTHER | Age: 58
End: 2023-06-28

## 2023-08-28 ENCOUNTER — TELEPHONE (OUTPATIENT)
Dept: CARDIOLOGY CLINIC | Facility: CLINIC | Age: 58
End: 2023-08-28

## 2023-08-28 ENCOUNTER — OFFICE VISIT (OUTPATIENT)
Dept: CARDIOLOGY CLINIC | Facility: CLINIC | Age: 58
End: 2023-08-28
Payer: COMMERCIAL

## 2023-08-28 VITALS
WEIGHT: 236.6 LBS | BODY MASS INDEX: 37.06 KG/M2 | HEART RATE: 93 BPM | SYSTOLIC BLOOD PRESSURE: 136 MMHG | DIASTOLIC BLOOD PRESSURE: 94 MMHG

## 2023-08-28 DIAGNOSIS — I48.19 PERSISTENT ATRIAL FIBRILLATION (HCC): ICD-10-CM

## 2023-08-28 DIAGNOSIS — I10 ESSENTIAL HYPERTENSION: Primary | ICD-10-CM

## 2023-08-28 PROCEDURE — 99214 OFFICE O/P EST MOD 30 MIN: CPT | Performed by: INTERNAL MEDICINE

## 2023-08-28 PROCEDURE — 93000 ELECTROCARDIOGRAM COMPLETE: CPT | Performed by: INTERNAL MEDICINE

## 2023-08-28 RX ORDER — METOPROLOL SUCCINATE 200 MG/1
200 TABLET, EXTENDED RELEASE ORAL 2 TIMES DAILY
Qty: 180 TABLET | Refills: 3 | Status: SHIPPED | OUTPATIENT
Start: 2023-08-28

## 2023-08-28 RX ORDER — DOFETILIDE 0.12 MG/1
125 CAPSULE ORAL 2 TIMES DAILY
Qty: 180 CAPSULE | Refills: 3 | Status: SHIPPED | OUTPATIENT
Start: 2023-08-28 | End: 2023-08-28

## 2023-08-28 NOTE — TELEPHONE ENCOUNTER
----- Message from Melissa Cerna MD sent at 8/28/2023  3:10 PM EDT -----  Can you schedule loop recorder.  thanks

## 2023-08-28 NOTE — PROGRESS NOTES
HEART AND VASCULAR  CARDIAC ELECTROPHYSIOLOGY   HEART RHYTHM CENTER  [facility]    Outpatient  Follow-up  Today's Date: 08/28/23        Patient name: Henry Jeter  YOB: 1965  Sex: male         Chief Complaint: f/u afib      ASSESSMENT:  Problem List Items Addressed This Visit        Cardiovascular and Mediastinum    Essential hypertension - Primary    Relevant Medications    metoprolol succinate (TOPROL-XL) 200 MG 24 hr tablet    Other Relevant Orders    POCT ECG    Echo complete w/ contrast if indicated    Persistent atrial fibrillation (HCC)    Relevant Medications    metoprolol succinate (TOPROL-XL) 200 MG 24 hr tablet    Other Relevant Orders    Echo complete w/ contrast if indicated     1) Persistent afib, in NSR x 3 years after ablation/tikosyn. 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 He is back on corlanor did not feel well and had CHF symptoms w/o it. 3) Stable CAD h/o PCI to LAD 2015    4) LBBB chronic  5) HTN mildly elevated sicne we stopped spirolactone (had gynecomastia from this)    6) NORA on CPAP    7) obesity- he has lost a lot of weight w diet oracio exercise    7) T2DM      PLAN:  1. Cont Corlanor symptoms and CHF much better on this  Also Cont toprol XL 200mg bid and symptomatic. His EF had improved 30 to 55 w Corlanor on board so   2. cont valsartan  3. Cont eliquis/  4. Check TTE has not had one in abour 4 years  5.d/c dofetilide, no afib now since ablation. 6. Recommend loop recorder for long term monitoring of afib, can consider stopping Eliquis if no recurrence if well monitored.       Follow up in: 6 months    Orders Placed This Encounter   Procedures   • POCT ECG   • Echo complete w/ contrast if indicated Medications Discontinued During This Encounter   Medication Reason   • metoprolol succinate (TOPROL-XL) 100 mg 24 hr tablet Reorder   • dofetilide (TIKOSYN) 125 mcg capsule Reorder   • dofetilide (TIKOSYN) 125 mcg capsule          . ............................................................................................ HPI/Subjective:   1) Persistent afib, in NSR x 3 years after ablation/tikosyn. 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 He is back on corlanor did not feel well and had CHF symptoms w/o it. 3) Stable CAD h/o PCI to LAD 2015    4) LBBB chronic  5) HTN mildly elevated sicne we stopped spirolactone (had gynecomastia from this)    6) NORA on CPAP    7) obesity- he has lost a lot of weight w diet oracio exercise    7) T2DM        Valerie Childs is doing well. Is  Ref for High school sports. No SOB, palpitations or CP. Past Medical History:   Diagnosis Date   • Abnormal EKG 12/8/2017   • ACE-inhibitor cough 10/13/2020   • Breast mass in male 2/22/2021   • CAD (coronary artery disease)    • Diabetes (HCC)    • Dizziness    • Erectile dysfunction    • GERD (gastroesophageal reflux disease)    • Hyperlipidemia    • Hypertension    • Shortness of breath        Allergies   Allergen Reactions   • Perflutren Lipid Microsphere Hives, Diarrhea and Vomiting     Drug - Definity used in echo   • Aceinhibitors [Ace Inhibitors] Cough     I reviewed the Home Medication list and Allergies in the chart.    Scheduled Meds:  Current Outpatient Medications   Medication Sig Dispense Refill   • apixaban (ELIQUIS) 5 mg Take 1 tablet (5 mg total) by mouth 2 (two) times a day 180 tablet 3   • aspirin 81 mg chewable tablet Chew 1 tablet (81 mg total) daily 100 tablet 1   • co-enzyme Q-10 30 MG capsule Take 30 mg by mouth daily     • ergocalciferol (ERGOCALCIFEROL) 1.25 MG (25080 UT) capsule Take 50,000 Units by mouth     • ezetimibe (ZETIA) 10 mg tablet Take 10 mg by mouth daily     • furosemide (LASIX) 20 mg tablet TAKE 1 TABLET(20 MG) BY MOUTH DAILY 90 tablet 3   • ivabradine HCl (Corlanor) 5 MG tablet Take 1 tablet (5 mg total) by mouth 2 (two) times a day 60 tablet 11   • metoprolol succinate (TOPROL-XL) 200 MG 24 hr tablet Take 1 tablet (200 mg total) by mouth 2 (two) times a day 180 tablet 3   • ONETOUCH DELICA LANCETS 91V MISC CHECK BID  1   • ONETOUCH VERIO test strip CHECK TWICE DAILY  3   • OZEMPIC 1 MG/DOSE SOPN INJECT 1 MG SQ Q WEEK  4   • rosuvastatin (CRESTOR) 20 MG tablet Take 1 tablet (20 mg total) by mouth daily 90 tablet 1   • sildenafil (VIAGRA) 50 MG tablet Take 1 tablet (50 mg total) by mouth daily as needed for erectile dysfunction 10 tablet 6   • SYNJARDY XR 12.5-1000 MG TB24 TK 1 T PO BID  2   • tamsulosin (FLOMAX) 0.4 mg TAKE 1 CAPSULE(0.4 MG) BY MOUTH DAILY WITH DINNER 90 capsule 3   • terazosin (HYTRIN) 1 mg capsule Take 1 mg by mouth     • valsartan (DIOVAN) 320 MG tablet TAKE 1 TABLET(320 MG) BY MOUTH DAILY 90 tablet 3     No current facility-administered medications for this visit.      PRN Meds:.        Family History   Problem Relation Age of Onset   • Coronary artery disease Family    • Diabetes Family    • No Known Problems Father        Social History     Socioeconomic History   • Marital status: /Civil Union     Spouse name: Not on file   • Number of children: Not on file   • Years of education: Not on file   • Highest education level: Not on file   Occupational History   • Not on file   Tobacco Use   • Smoking status: Never   • Smokeless tobacco: Former     Quit date: 9/7/2014   Vaping Use   • Vaping Use: Never used   Substance and Sexual Activity   • Alcohol use: No   • Drug use: No   • Sexual activity: Not on file   Other Topics Concern   • Not on file   Social History Narrative   • Not on file     Social Determinants of Health     Financial Resource Strain: Not on file   Food Insecurity: Not on file   Transportation Needs: Not on file   Physical Activity: Not on file   Stress: Not on file   Social Connections: Not on file   Intimate Partner Violence: Not on file   Housing Stability: Not on file         OBJECTIVE:    /94 (BP Location: Right arm, Patient Position: Sitting, Cuff Size: Large)   Pulse 93   Wt 107 kg (236 lb 9.6 oz)   BMI 37.06 kg/m²   Vitals:    08/28/23 1439   Weight: 107 kg (236 lb 9.6 oz)     /94 (BP Location: Right arm, Patient Position: Sitting, Cuff Size: Large)   Pulse 93   Wt 107 kg (236 lb 9.6 oz)   BMI 37.06 kg/m²   Vitals:    08/28/23 1439   Weight: 107 kg (236 lb 9.6 oz)     /94 (BP Location: Right arm, Patient Position: Sitting, Cuff Size: Large)   Pulse 93   Wt 107 kg (236 lb 9.6 oz)   BMI 37.06 kg/m²   Vitals:    08/28/23 1439   Weight: 107 kg (236 lb 9.6 oz)     GEN: No acute distress, Alert and oriented,   HEENT:External ears normal, wearing a mask. EYES: Pupils equal, sclera anicteric, midline, normal conjuctiva  NECK:  JVD, supple, no obvious masses or thryomegaly or goiter  CARDIOVASCULAR:RRR No murmur, rub, gallops S1,S2  LUNGS: Clear To auscultation bilaterally, normal effort, no rales, rhonchi, crackles   ABDOMEN:  nondistended,  without obvious organomegaly or ascites  EXTREMITIES/VASCULAR: trace nolan edema. warm an well perfused. PSYCH: Normal Affect,  linear speech pattern without evidence of psychosis. NEURO: Grossly intact, moving all extremiteis equal, face symmetric, alert and responsive, no obvious focal defecits   GAIT:  Ambulates normally without difficulty  HEME: No bleeding, bruising, petechia, purpura   SKIN: No significant rashes on visibile skin, warm, no diaphoresis or pallor.        Lab Results:       LABS:      Chemistry        Component Value Date/Time    K 3.9 01/13/2022 0000     01/13/2022 0000    CO2 26 01/13/2022 0000    BUN 22 01/13/2022 0000    CREATININE 0.86 01/13/2022 0000    CREATININE 0.88 02/04/2019 0817        Component Value Date/Time    CALCIUM 8.7 01/13/2022 0000    ALKPHOS 81 01/13/2022 0000    AST 13 01/13/2022 0000    ALT 15 01/13/2022 0000            No results found for: "CHOL"  Lab Results   Component Value Date    HDL 37 (L) 07/20/2022    HDL 33 (L) 01/13/2022    HDL 38 (L) 10/09/2020     Lab Results   Component Value Date    LDLCALC 43 07/20/2022    LDLCALC 63 01/13/2022    LDLCALC 92 10/09/2020     Lab Results   Component Value Date    TRIG 154 (H) 07/20/2022    TRIG 115 01/13/2022    TRIG 110 10/09/2020     No results found for: "CHOLHDL"    IMAGING: Us Breast Left Limited (diagnostic), Mammo Diagnostic Bilateral W 3d & Cad    Result Date: 3/5/2021  Narrative: DIAGNOSIS: Breast mass in male TECHNIQUE: Digital diagnostic mammography was performed. Computer Aided Detection (CAD) analyzed all applicable images. Ultrasound of the left breast(s) was performed. COMPARISONS: None available. RELEVANT HISTORY: Family Breast Cancer History: No known family history of breast cancer. Family Medical History: No known relevant family medical history. Personal History: No known relevant hormone history. No known relevant surgical history. No known relevant medical history. RISK ASSESSMENT: [facility] reporting was suppressed due to the patient's history and/or demographic factors. TISSUE DENSITY: The breasts are almost entirely fatty. INDICATION: Bakari Paz is a 50 y.o. male presenting for evaluation of a tender palpable left breast mass. FINDINGS: Palpable triangle marker over the left breast has moderate subjacent gynecomastia. There is no underlying mass on mammography. Sonographic evaluation left retroareolar breast was performed in the area of palpable tender concern. There is moderate gynecomastia.  There is no significant gynecomastia on the right. No suspicious masses in right breast on mammography. Impression:   Moderate left gynecomastia accounting for patient's tender palpable concern. Recommend clinical management. ASSESSMENT/BI-RADS CATEGORY: Left: 2 - Benign Right: 1 - Negative Overall: 2 - Benign RECOMMENDATION:      - Clinical management for both breasts. Workstation ID: [id number]        Cardiac testing:   No results found for this or any previous visit. Results for orders placed during the hospital encounter of 19   ELZBIETA    Narrative [address]  [phone]    Transesophageal Echocardiogram  2D, Doppler, and Color Doppler    Study date:  2019    Patient: Usha Beasley  MR number: [id number]  Account number: [de-identified]  : 1965  Age: 48 years  Gender: Male  Status: Inpatient  Location: Echo lab  Height: 67 in  Weight: 235 lb  BP: 117/ 83 mmHg    Indications: Afib    Diagnoses: I48.0 - Atrial fibrillation    Sonographer:  ALEX Dukes  Primary Physician:  Percy Knox DO  Referring Physician:  Xavi Chavez MD  Group:  [facility]  Interpreting Physician:  Kalyani Ramirez MD    SUMMARY    LEFT VENTRICLE:  The ventricle was moderately dilated. Systolic function was moderately to markedly reduced. Ejection fraction was estimated to be 30 %. LEFT ATRIUM:  The atrium was mildly to moderately dilated. TRICUSPID VALVE:  There was mild regurgitation. HISTORY: PRIOR HISTORY: Afib, CAD, HTN, NORA, DM2, HLD, Former smoker, Cardiomyopathy    PROCEDURE: The procedure was performed in the echo lab. This was a routine study. The risks and alternatives of the procedure were explained to the patient and informed consent was obtained. The transesophageal approach was used. The study  included complete 2D imaging, complete spectral Doppler, and color Doppler.  The heart rate was 114 bpm, at the start of the study. An adult omniplane probe was inserted by the attending cardiologist. Intubated with difficulty. Four  intubation attempt(s). There was no blood detected on the probe. Image quality was adequate. MEDICATIONS: Anesthesia. LEFT VENTRICLE: The ventricle was moderately dilated. Systolic function was moderately to markedly reduced. Ejection fraction was estimated to be 30 %. RIGHT VENTRICLE: The size was normal. Systolic function was normal. Wall thickness was normal.    LEFT ATRIUM: The atrium was mildly to moderately dilated. No mass was present. There was no spontaneous echo contrast ("smoke"). APPENDAGE: The size was normal. No thrombus was identified. DOPPLER: The function was mildly reduced (mildly  reduced emptying velocity). MITRAL VALVE: Valve structure was normal. There was normal leaflet separation. There was no echocardiographic evidence of vegetation. DOPPLER: There was no regurgitation. AORTIC VALVE: The valve was trileaflet. Leaflets exhibited normal thickness and normal cuspal separation. There was no echocardiographic evidence of vegetation. DOPPLER: There was no regurgitation. TRICUSPID VALVE: The valve structure was normal. There was normal leaflet separation. There was no echocardiographic evidence of vegetation. DOPPLER: There was mild regurgitation. PERICARDIUM: There was no pericardial effusion. The pericardium was normal in appearance. [facility] Accredited Echocardiography Laboratory    Prepared and electronically signed by    Maria Gabriel MD  Signed 25-Jan-2019 17:11:44       No results found for this or any previous visit. No results found for this or any previous visit.         I reviewed and interpreted the following LABS/EKG/TELE/IMAGING and below is summary of my interpretation (if data available):        Current EKG and Rhythm Strip:NSR, 94bpm LBBB

## 2023-08-28 NOTE — TELEPHONE ENCOUNTER
Hi  & EP Clinical,    I'm following up on patient's referral for a LOOP Recorder Implant procedure. Per patient's insurance of [facility], they require patient to do 2W ZIO prior to scheduling LOOP Recorder. Please order and inform patient. Once results are in Epic, I can schedule the procedure. Lay,  Please obtain auth for 2W ZIO.       Thank you,   Eden "Marycruz Bennett" RatliffZapproved

## 2023-08-29 NOTE — TELEPHONE ENCOUNTER
/Shauna Clinical,    Please see message below from [address] [place] [address] DrRoddy Please order Biotel and contact patient.     Thanks,  Eden "Saqib Krishna" Elenitas Prasanth

## 2023-08-29 NOTE — TELEPHONE ENCOUNTER
This patient has a [facility] product which does not participate with [employer]. If the patient can have a Biotel, they are participating and no Bhavik Pair is required.

## 2023-08-31 NOTE — TELEPHONE ENCOUNTER
Patient agreeable to Biotel X2 weeks. He is a  so we will have to be sure to use extra adhesive to hook-up. I will add to nurse schedule for September 12 @ 10 AM (currently away on vacation). I will double check with [facility] on insurance auth.

## 2023-09-07 ENCOUNTER — CLINICAL SUPPORT (OUTPATIENT)
Dept: FAMILY MEDICINE CLINIC | Facility: CLINIC | Age: 58
End: 2023-09-07
Payer: COMMERCIAL

## 2023-09-07 DIAGNOSIS — Z23 NEED FOR VACCINATION: Primary | ICD-10-CM

## 2023-09-07 PROCEDURE — 90750 HZV VACC RECOMBINANT IM: CPT

## 2023-09-07 PROCEDURE — 90471 IMMUNIZATION ADMIN: CPT

## 2023-09-19 ENCOUNTER — TELEPHONE (OUTPATIENT)
Dept: CARDIOLOGY CLINIC | Facility: CLINIC | Age: 58
End: 2023-09-19

## 2023-09-19 ENCOUNTER — HOSPITAL ENCOUNTER (OUTPATIENT)
Dept: NON INVASIVE DIAGNOSTICS | Facility: CLINIC | Age: 58
Discharge: HOME | End: 2023-09-19
Payer: COMMERCIAL

## 2023-09-19 VITALS
WEIGHT: 236 LBS | HEART RATE: 70 BPM | DIASTOLIC BLOOD PRESSURE: 94 MMHG | HEIGHT: 67 IN | BODY MASS INDEX: 37.04 KG/M2 | SYSTOLIC BLOOD PRESSURE: 136 MMHG

## 2023-09-19 DIAGNOSIS — I10 ESSENTIAL HYPERTENSION: ICD-10-CM

## 2023-09-19 DIAGNOSIS — I48.19 PERSISTENT ATRIAL FIBRILLATION (HCC): ICD-10-CM

## 2023-09-19 LAB
AORTIC ROOT: 2.5 CM
APICAL FOUR CHAMBER EJECTION FRACTION: 37 %
AV REGURGITATION PRESSURE HALF TIME: 572 MS
E WAVE DECELERATION TIME: 159 MS
FRACTIONAL SHORTENING: 20 (ref 28–44)
INTERVENTRICULAR SEPTUM IN DIASTOLE (PARASTERNAL SHORT AXIS VIEW): 1.4 CM
INTERVENTRICULAR SEPTUM: 1.4 CM (ref 0.6–1.1)
LAAS-AP2: 17.4 CM2
LAAS-AP4: 20.6 CM2
LEFT ATRIUM AREA SYSTOLE SINGLE PLANE A4C: 19.1 CM2
LEFT ATRIUM SIZE: 4.1 CM
LEFT ATRIUM VOLUME (MOD BIPLANE): 58 ML
LEFT INTERNAL DIMENSION IN SYSTOLE: 4.5 CM (ref 2.1–4)
LEFT VENTRICULAR INTERNAL DIMENSION IN DIASTOLE: 5.6 CM (ref 3.5–6)
LEFT VENTRICULAR POSTERIOR WALL IN END DIASTOLE: 1.4 CM
LEFT VENTRICULAR STROKE VOLUME: 60 ML
LVSV (TEICH): 60 ML
MV E'TISSUE VEL-SEP: 6 CM/S
MV PEAK A VEL: 0.76 M/S
MV PEAK E VEL: 63 CM/S
MV STENOSIS PRESSURE HALF TIME: 46 MS
MV VALVE AREA P 1/2 METHOD: 4.78
RA PRESSURE ESTIMATED: 8 MMHG
RIGHT ATRIUM AREA SYSTOLE A4C: 15.5 CM2
RIGHT VENTRICLE ID DIMENSION: 4 CM
SL CV AV DECELERATION TIME RETROGRADE: 1971 MS
SL CV AV PEAK GRADIENT RETROGRADE: 14 MMHG
SL CV LEFT ATRIUM LENGTH A2C: 4.7 CM
SL CV LV EF: 45
SL CV PED ECHO LEFT VENTRICLE DIASTOLIC VOLUME (MOD BIPLANE) 2D: 152 ML
SL CV PED ECHO LEFT VENTRICLE SYSTOLIC VOLUME (MOD BIPLANE) 2D: 91 ML
TRICUSPID ANNULAR PLANE SYSTOLIC EXCURSION: 2 CM

## 2023-09-19 PROCEDURE — 93306 TTE W/DOPPLER COMPLETE: CPT | Performed by: INTERNAL MEDICINE

## 2023-09-19 PROCEDURE — 93306 TTE W/DOPPLER COMPLETE: CPT

## 2023-09-19 NOTE — TELEPHONE ENCOUNTER
----- Message from Lu Tubbs MD sent at 9/19/2023  3:40 PM EDT -----  Please let Melissa Day know echo looks unchanged from last year (mildly reduced EF 45).

## 2023-11-22 NOTE — TELEPHONE ENCOUNTER
Julissa Carodso from [facility] called in stating the pt needs an insurance referral for consult. Her diagnosis codes are i48.19 and i10. The NPI # is [id number].

## 2023-11-24 NOTE — ED ATTENDING ATTESTATION
11/24/2023  ISharda MD, saw and evaluated the patient. I have discussed the patient with the resident/non-physician practitioner and agree with the resident's/non-physician practitioner's findings, Plan of Care, and MDM as documented in the resident's/non-physician practitioner's note, except where noted. All available labs and Radiology studies were reviewed. I was present for key portions of any procedure(s) performed by the resident/non-physician practitioner and I was immediately available to provide assistance. At this point I agree with the current assessment done in the Emergency Department. I have conducted an independent evaluation of this patient a history and physical is as follows:    ED Course       Patient is a 53-year-old male brought in by EMS as cardiac arrest/CODE BLUE. Patient was a witnessed arrest at home by significant other who states that patient collapsed from bed height to floor in bedroom. In the process patient struck his left side of his forehead sustaining a small laceration bleeding controlled prior to arrival.  Patient noted to be nonresponsive. Bystander CPR was started immediately by family. Upon arrival, EMS noted patient was apneic pulseless. ACLS treatment initiated by EMS including endotracheal intubation IO placement. Patient noted to be initially in a shockable rhythm (V-fib) shocked several times given multiple doses of epinephrine as well as intraosseous lidocaine. Patient's rhythm deteriorated to PEA. Patient was transported to the hospital with continuous CPR by [facility] device and ACLS measures. Upon arrival to ED, patient remained pulseless and apneic ongoing compressions and being bagged manually by EMS personnel. ET tube confirmed by end-tidal capnography.   ACLS measures resumed including multiple doses of epinephrine, sodium bicarbonate, amiodarone    Patient had a transient episode of ventricular fibrillation which was defibrillated x1 which reverted back to asystole PEA. A left femoral central line was placed by EM 1 resident under emergent nonsterile conditions. Pericardiocentesis was attempted by EM resident given PEA arrest - dry tap procedure aborted. ED bedside point-of-care cardiac ultrasound showed no cardiac activity    Despite multiple rounds of ACLS maximal medical therapy patient remained in persistent cardiac arrest and code called due to futility of medical efforts    TOD 2:22 am      contacted - case discussed - will decline jurisdiction - I will certify cause of death as AMI     Family was updated regarding patient's disposition chaplaincy consulted and was present for family.           Critical Care Time  CriticalCare Time    Date/Time: 11/24/2023 9:52 PM    Performed by: Cecile Sloan MD  Authorized by: Cecile Sloan MD    Critical care provider statement:     Critical care time (minutes):  40    Critical care time was exclusive of:  Separately billable procedures and treating other patients and teaching time    Critical care was necessary to treat or prevent imminent or life-threatening deterioration of the following conditions:  Cardiac failure and circulatory failure    Critical care was time spent personally by me on the following activities:  Development of treatment plan with patient or surrogate, obtaining history from patient or surrogate, evaluation of patient's response to treatment, examination of patient, ordering and performing treatments and interventions and re-evaluation of patient's condition    I assumed direction of critical care for this patient from another provider in my specialty: no

## 2023-11-24 NOTE — ED PROVIDER NOTES
History  Chief Complaint   Patient presents with    Cardiac Arrest     HPI    Prior to Admission Medications   Prescriptions Last Dose Informant Patient Reported? Taking?    ONETOUCH DELICA LANCETS 04G MISC  Self Yes No   Sig: CHECK BID   ONETOUCH VERIO test strip  Self Yes No   Sig: CHECK TWICE DAILY   OZEMPIC 1 MG/DOSE SOPN  Self Yes No   Sig: INJECT 1 MG SQ Q WEEK   SYNJARDY XR 12.5-1000 MG TB24  Self Yes No   Sig: TK 1 T PO BID   apixaban (ELIQUIS) 5 mg  Self No No   Sig: Take 1 tablet (5 mg total) by mouth 2 (two) times a day   aspirin 81 mg chewable tablet  Self No No   Sig: Chew 1 tablet (81 mg total) daily   co-enzyme Q-10 30 MG capsule  Self Yes No   Sig: Take 30 mg by mouth daily   ergocalciferol (ERGOCALCIFEROL) 1.25 MG (85729 UT) capsule  Self Yes No   Sig: Take 50,000 Units by mouth   ezetimibe (ZETIA) 10 mg tablet  Self Yes No   Sig: Take 10 mg by mouth daily   furosemide (LASIX) 20 mg tablet  Self No No   Sig: TAKE 1 TABLET(20 MG) BY MOUTH DAILY   ivabradine HCl (Corlanor) 5 MG tablet  Self No No   Sig: Take 1 tablet (5 mg total) by mouth 2 (two) times a day   metoprolol succinate (TOPROL-XL) 200 MG 24 hr tablet   No No   Sig: Take 1 tablet (200 mg total) by mouth 2 (two) times a day   rosuvastatin (CRESTOR) 20 MG tablet  Self No No   Sig: Take 1 tablet (20 mg total) by mouth daily   sildenafil (VIAGRA) 50 MG tablet  Self No No   Sig: Take 1 tablet (50 mg total) by mouth daily as needed for erectile dysfunction   tamsulosin (FLOMAX) 0.4 mg  Self No No   Sig: TAKE 1 CAPSULE(0.4 MG) BY MOUTH DAILY WITH DINNER   terazosin (HYTRIN) 1 mg capsule  Self Yes No   Sig: Take 1 mg by mouth   valsartan (DIOVAN) 320 MG tablet  Self No No   Sig: TAKE 1 TABLET(320 MG) BY MOUTH DAILY      Facility-Administered Medications: None       Past Medical History:   Diagnosis Date    Abnormal EKG 12/8/2017    ACE-inhibitor cough 10/13/2020    Breast mass in male 2/22/2021    CAD (coronary artery disease)     Diabetes ([address]) Dizziness     Erectile dysfunction     GERD (gastroesophageal reflux disease)     Hyperlipidemia     Hypertension     Shortness of breath        Past Surgical History:   Procedure Laterality Date    CARDIAC CATHETERIZATION      ELECTRICAL CARDIOVERSION  2/4/2019            Family History   Problem Relation Age of Onset    Coronary artery disease Family     Diabetes Family     No Known Problems Father      I have reviewed and agree with the history as documented.     E-Cigarette/Vaping    E-Cigarette Use Never User      E-Cigarette/Vaping Substances    Nicotine No     THC No     CBD No     Flavoring No     Other No     Unknown No      Social History     Tobacco Use    Smoking status: Never    Smokeless tobacco: Former     Quit date: 9/7/2014   Vaping Use    Vaping Use: Never used   Substance Use Topics    Alcohol use: No    Drug use: No        Review of Systems    Physical Exam  ED Triage Vitals   Temp Pulse Resp Blood Pressure SpO2   -- 11/24/23 0205 -- 11/24/23 0207 11/24/23 0207    (!) 0  147/65 (!) 50 %      Temp src Heart Rate Source Patient Position - Orthostatic VS BP Location FiO2 (%)   -- -- -- -- --             Pain Score       --                    Orthostatic Vital Signs  Vitals:    11/24/23 0215 11/24/23 0217 11/24/23 0217 11/24/23 0219   BP:  147/69     Pulse: (!) 0  (!) 0 (!) 0       Physical Exam    ED Medications  Medications   EPINEPHrine (FOR EMS ONLY) (ADRENALIN) injection 1 mg (has no administration in time range)   EPINEPHrine (ADRENALIN) 0.1 mg/mL injection **ADS Override Pull** (has no administration in time range)   sodium bicarbonate 50 mEq/50 mL injection (50 mEq Intravenous Given 11/24/23 0206)   EPINEPHrine (ADRENALIN) injection (1 mg Intravenous Given 11/24/23 0214)   amiodarone 150 mg/3 mL injection (150 mg Intravenous Given 11/24/23 0210)   calcium chloride 1 g/10 mL injection (1 g Intravenous Given 11/24/23 0215)       Diagnostic Studies  Results Reviewed       Procedure Component Value Units Date/Time    POCT Blood Gas (CG8+) [[id number]]  (Abnormal) Collected: 11/24/23 0217    Lab Status: Final result Specimen: Venous Updated: 11/24/23 0224     ph, Jabari ISTAT 6.820     pCO2, Jabari i-STAT >103.0 mm HG      pO2, Jabari i-STAT 29.0 mm HG      BE, i-STAT --     HCO3, Jabari i-STAT --     CO2, i-STAT --     O2 Sat, i-STAT --     SODIUM, I-STAT 142 mmol/l      Potassium, i-STAT 4.8 mmol/L      Calcium, Ionized i-STAT 1.04 mmol/L      Hct, i-STAT 47 %      Hgb, i-STAT 16.0 g/dl      Glucose, i-STAT 326 mg/dl      Specimen Type VENOUS                   No orders to display         Procedures  Procedures      ED Course                                       Medical Decision Making  Risk  Prescription drug management. Disposition  Final diagnoses:   None     ED Disposition       None          Follow-up Information    None         Patient's Medications   Discharge Prescriptions    No medications on file     No discharge procedures on file. PDMP Review       None             ED Provider  Attending physically available and evaluated Yumiko Jeter. I managed the patient along with the ED Attending.     Electronically Signed by Starting Thu 4/8/2021, Until Mon 8/28/2023, Historical Med      ONETOUCH DELICA LANCETS 52H MISC CHECK BID, Historical Med      ONETOUCH VERIO test strip CHECK TWICE DAILY, Historical Med      OZEMPIC 1 MG/DOSE SOPN INJECT 1 MG SQ Q WEEK, Historical Med      SYNJARDY XR 12.5-1000 MG TB24 TK 1 T PO BID, Historical Med      terazosin (HYTRIN) 1 mg capsule Take 1 mg by mouth, Starting Mon 3/27/2023, Until Tue 3/26/2024 at 2359, Historical Med      apixaban (ELIQUIS) 5 mg Take 1 tablet (5 mg total) by mouth 2 (two) times a day, Starting Fri 2/10/2023, Normal      aspirin 81 mg chewable tablet Chew 1 tablet (81 mg total) daily, Starting Fri 9/7/2018, Normal      furosemide (LASIX) 20 mg tablet TAKE 1 TABLET(20 MG) BY MOUTH DAILY, Normal      ivabradine HCl (Corlanor) 5 MG tablet Take 1 tablet (5 mg total) by mouth 2 (two) times a day, Starting Fri 2/10/2023, Normal      metoprolol succinate (TOPROL-XL) 200 MG 24 hr tablet Take 1 tablet (200 mg total) by mouth 2 (two) times a day, Starting Mon 8/28/2023, Normal      rosuvastatin (CRESTOR) 20 MG tablet Take 1 tablet (20 mg total) by mouth daily, Starting Thu 8/26/2021, Normal      sildenafil (VIAGRA) 50 MG tablet Take 1 tablet (50 mg total) by mouth daily as needed for erectile dysfunction, Starting Wed 7/20/2022, Normal      tamsulosin (FLOMAX) 0.4 mg TAKE 1 CAPSULE(0.4 MG) BY MOUTH DAILY WITH DINNER, Normal      valsartan (DIOVAN) 320 MG tablet TAKE 1 TABLET(320 MG) BY MOUTH DAILY, Normal           No discharge procedures on file. PDMP Review       None             ED Provider  Attending physically available and evaluated Dwightcarolyn Jeter. I managed the patient along with the ED Attending.     Electronically Signed by           Jose Carrillo DO  12/05/23 [id number]

## 2023-11-24 NOTE — PROGRESS NOTES
23 0300   Clinical Encounter Type   Visited With Patient and family together;Health care provider   Crisis Visit ED; Death      Pastoral Care Progress Note    2023  Patient: Salinas Rivers : 1965  Admission Date & Time: 2023 0200  MRN: [id number] CSN: [id number]                     Chaplaincy Interventions Utilized:   Empowerment: Encouraged focus on present, Facilitated group experience, Normalized experience of patient/family, Provided anxiety containment, and Provided grief counseling    Exploration: Explored alternatives, Explored emotional needs & resources, Explored relational needs & resources, Explored spiritual needs & resources, and Facilitated story telling    Collaboration: Advocated for patient/family    Relationship Building: Cultivated a relationship of care and support, Facilitated reconciliation with the transcendent, Mediated conflict, Hospitality, and Provided silent and supportive presence      Chaplaincy Outcomes Achieved:  Catharsis, Debriefed/defused experience, Expressed gratitude, Tearfully processed emotions, and Verbally processed emotions      Spiritual Coping Strategies Utilized:   Connectedness     responded to TT to support pt's family, pt did not survive cardiac arrest at home.  offered extensive grief support and grief management to pt's beloved wife, Alejandro Mancuso, who was quite bereft and shocked, as well well as pt's mother and brother and pt's MIL.  offered emotional support to family, compassionate presence and hospitality. Pt's father joined family later and  extended support and kindness. Pt's family is exceptionally grieved.  assisted in explaining post mortem procedure to pt's wife.  also provided grief support to pt's co-workers who were present in ED. Spiritual Care remains available.

## 2023-11-28 ENCOUNTER — VBI (OUTPATIENT)
Dept: ADMINISTRATIVE | Facility: OTHER | Age: 58
End: 2023-11-28

## 2023-11-28 NOTE — TELEPHONE ENCOUNTER
11/28/23 2:55 PM     [employer] SmartForm used to document caregap status.     Aishwarya Lopez
[facility]

## 2023-12-01 ENCOUNTER — RA CDI HCC (OUTPATIENT)
Dept: OTHER | Facility: HOSPITAL | Age: 58
End: 2023-12-01